# Patient Record
Sex: MALE | Race: WHITE | NOT HISPANIC OR LATINO | Employment: OTHER | URBAN - METROPOLITAN AREA
[De-identification: names, ages, dates, MRNs, and addresses within clinical notes are randomized per-mention and may not be internally consistent; named-entity substitution may affect disease eponyms.]

---

## 2019-08-23 ENCOUNTER — OFFICE VISIT (OUTPATIENT)
Dept: URGENT CARE | Facility: CLINIC | Age: 59
End: 2019-08-23
Payer: MEDICAID

## 2019-08-23 VITALS
HEART RATE: 101 BPM | DIASTOLIC BLOOD PRESSURE: 110 MMHG | SYSTOLIC BLOOD PRESSURE: 158 MMHG | RESPIRATION RATE: 16 BRPM | WEIGHT: 170 LBS | OXYGEN SATURATION: 98 % | TEMPERATURE: 98 F | HEIGHT: 70 IN | BODY MASS INDEX: 24.34 KG/M2

## 2019-08-23 DIAGNOSIS — H60.91 OTITIS EXTERNA OF RIGHT EAR, UNSPECIFIED CHRONICITY, UNSPECIFIED TYPE: Primary | ICD-10-CM

## 2019-08-23 DIAGNOSIS — H66.91 RIGHT OTITIS MEDIA, UNSPECIFIED OTITIS MEDIA TYPE: ICD-10-CM

## 2019-08-23 PROCEDURE — 99203 PR OFFICE/OUTPT VISIT, NEW, LEVL III, 30-44 MIN: ICD-10-PCS | Mod: S$GLB,,, | Performed by: PHYSICIAN ASSISTANT

## 2019-08-23 PROCEDURE — 99203 OFFICE O/P NEW LOW 30 MIN: CPT | Mod: S$GLB,,, | Performed by: PHYSICIAN ASSISTANT

## 2019-08-23 RX ORDER — NEOMYCIN SULFATE, POLYMYXIN B SULFATE, HYDROCORTISONE 3.5; 10000; 1 MG/ML; [USP'U]/ML; MG/ML
3 SOLUTION/ DROPS AURICULAR (OTIC) 4 TIMES DAILY
Qty: 10 ML | Refills: 0 | Status: SHIPPED | OUTPATIENT
Start: 2019-08-23 | End: 2019-08-30

## 2019-08-23 RX ORDER — FERROUS SULFATE, DRIED 160(50) MG
1 TABLET, EXTENDED RELEASE ORAL
COMMUNITY
Start: 2019-07-16 | End: 2022-03-09

## 2019-08-23 RX ORDER — FERROUS SULFATE 325(65) MG
325 TABLET ORAL DAILY
Status: ON HOLD | COMMUNITY
Start: 2019-07-16 | End: 2022-03-12

## 2019-08-23 RX ORDER — BICTEGRAVIR SODIUM, EMTRICITABINE, AND TENOFOVIR ALAFENAMIDE FUMARATE 50; 200; 25 MG/1; MG/1; MG/1
1 TABLET ORAL DAILY
Refills: 3 | COMMUNITY
Start: 2019-07-29

## 2019-08-23 RX ORDER — AMOXICILLIN AND CLAVULANATE POTASSIUM 875; 125 MG/1; MG/1
1 TABLET, FILM COATED ORAL 2 TIMES DAILY
Qty: 20 TABLET | Refills: 0 | Status: SHIPPED | OUTPATIENT
Start: 2019-08-23 | End: 2019-09-02

## 2019-08-23 RX ORDER — NADOLOL 40 MG/1
40 TABLET ORAL
COMMUNITY
Start: 2018-12-27 | End: 2022-03-09 | Stop reason: SDUPTHER

## 2019-08-23 NOTE — PROGRESS NOTES
"Subjective:       Patient ID: Daniel Good is a 58 y.o. male.    Vitals:  height is 5' 10" (1.778 m) and weight is 77.1 kg (170 lb). His oral temperature is 98.3 °F (36.8 °C). His blood pressure is 158/110 (abnormal) and his pulse is 101. His respiration is 16 and oxygen saturation is 98%.     Chief Complaint: Otalgia (Right)    Otalgia    There is pain in the right ear. This is a new problem. The current episode started in the past 7 days. The problem occurs constantly (while moving his mouth). The problem has been gradually worsening. There has been no fever. The pain is at a severity of 6/10. The pain is moderate. Associated symptoms include hearing loss. Pertinent negatives include no abdominal pain, coughing, diarrhea, ear discharge, headaches, neck pain, rash, rhinorrhea, sore throat or vomiting. He has tried nothing for the symptoms.       Constitution: Negative for chills, sweating, fatigue and fever.   HENT: Positive for ear pain and hearing loss. Negative for ear discharge, foreign body in ear, tinnitus, congestion, sore throat, trouble swallowing and voice change.    Neck: Negative for neck pain, neck stiffness, painful lymph nodes and neck swelling.   Cardiovascular: Negative for chest pain, leg swelling, palpitations, sob on exertion and passing out.   Eyes: Negative for eye pain, eye redness, photophobia, double vision and blurred vision.   Respiratory: Negative for chest tightness, cough, sputum production, bloody sputum, shortness of breath, stridor and wheezing.    Gastrointestinal: Negative for abdominal pain, abdominal bloating, nausea, vomiting, constipation, diarrhea and heartburn.   Genitourinary: Negative for dysuria, frequency and urgency.   Musculoskeletal: Negative for joint pain, joint swelling, muscle cramps and muscle ache.   Skin: Negative for color change, pale, rash and hives.   Allergic/Immunologic: Negative for seasonal allergies, hives, itching and sneezing. "   Neurological: Negative for dizziness, light-headedness, passing out, facial drooping, speech difficulty, loss of balance, headaches, altered mental status, loss of consciousness and seizures.   Hematologic/Lymphatic: Negative for swollen lymph nodes, easy bruising/bleeding and history of blood clots. Does not bruise/bleed easily.   Psychiatric/Behavioral: Negative for altered mental status, nervous/anxious, sleep disturbance and depression. The patient is not nervous/anxious.        Objective:      Physical Exam   Constitutional: He is oriented to person, place, and time. He appears well-developed and well-nourished. He is cooperative.  Non-toxic appearance. He does not appear ill. No distress.   HENT:   Head: Normocephalic and atraumatic.   Right Ear: External ear and ear canal normal. No lacerations. There is swelling and tenderness. No drainage. No foreign bodies. No mastoid tenderness. Tympanic membrane is injected and erythematous. Tympanic membrane is not scarred, not perforated, not retracted and not bulging. Tympanic membrane mobility is abnormal. A middle ear effusion is present. No hemotympanum. Decreased hearing is noted.   Left Ear: Hearing, tympanic membrane, external ear and ear canal normal.   Nose: Nose normal. No mucosal edema, rhinorrhea or nasal deformity. No epistaxis. Right sinus exhibits no maxillary sinus tenderness and no frontal sinus tenderness. Left sinus exhibits no maxillary sinus tenderness and no frontal sinus tenderness.   Mouth/Throat: Uvula is midline, oropharynx is clear and moist and mucous membranes are normal. No trismus in the jaw. Normal dentition. No uvula swelling. No posterior oropharyngeal erythema.   R ear tragal and pinna with retraction tenderness.   Eyes: Conjunctivae and lids are normal. No scleral icterus.   Sclera clear bilat   Neck: Trachea normal, full passive range of motion without pain and phonation normal. Neck supple.   Cardiovascular: Normal rate, regular  rhythm, normal heart sounds, intact distal pulses and normal pulses.   Pulmonary/Chest: Effort normal and breath sounds normal. No respiratory distress.   Abdominal: Soft. Normal appearance and bowel sounds are normal. He exhibits no distension. There is no tenderness.   Musculoskeletal: Normal range of motion. He exhibits no edema or deformity.   Neurological: He is alert and oriented to person, place, and time. He exhibits normal muscle tone. Coordination normal.   Skin: Skin is warm, dry and intact. Capillary refill takes less than 2 seconds. No rash noted. He is not diaphoretic. No pallor.   Psychiatric: He has a normal mood and affect. His speech is normal and behavior is normal. Judgment and thought content normal. Cognition and memory are normal.   Nursing note and vitals reviewed.      Assessment:       1. Otitis externa of right ear, unspecified chronicity, unspecified type    2. Right otitis media, unspecified otitis media type        Plan:         Otitis externa of right ear, unspecified chronicity, unspecified type  -     neomycin-polymyxin-hydrocortisone (CORTISPORIN) otic solution; Place 3 drops into the right ear 4 (four) times daily. for 7 days  Dispense: 10 mL; Refill: 0    Right otitis media, unspecified otitis media type  -     amoxicillin-clavulanate 875-125mg (AUGMENTIN) 875-125 mg per tablet; Take 1 tablet by mouth 2 (two) times daily. for 10 days  Dispense: 20 tablet; Refill: 0      Patient Instructions   You must understand that you've received an Urgent Care treatment only and that you may be released before all your medical problems are known or treated. You, the patient, will arrange for follow up care as instructed.  Follow up with your PCP or specialty clinic as directed if not improved or as needed. You can call [phone] to schedule an appointment with the appropriate provider.  If your condition worsens we recommend that you receive another evaluation at the Emergency Department  for any concerns or worsening of condition.  Patient aware and verbalized understanding.    Take full course of antibiotics as prescribed.  Humidifier use at home.  Warm compresses to affected ear.  Elevate head on a pillow at night.   Over the Counter Flonase Nasal Spray as directed for nasal congestion.  Over the Counter Claritin, Allegra or Zyrtec (plain) as directed for allergy symptoms/nasal congestion.  Over the Counter Tylenol every 6 hours as needed for fever or ear pain.  No swimming for at least 7-10 days and DO NOT place any foreign objects including Q-tips into affected ear because this could make it worse.  Follow up with your PCP in 1 week of initiating antibiotics or sooner for no improvement in symptoms.  Follow up in the ER for any worsening of symptoms such as new fever, increasing ear pain, neck stiffness, shortness of breath, etc.  If you smoke, please stop smoking.  Patient aware, verbalized understanding and agreed with plan of care.    Middle Ear Infection (Adult)  You have an infection of the middle ear, the space behind the eardrum. This is also called acute otitis media (AOM). Sometimes it is caused by the common cold. This is because congestion can block the internal passage (eustachian tube) that drains fluid from the middle ear. When the middle ear fills with fluid, bacteria can grow there and cause an infection. Oral antibiotics are used to treat this illness, not ear drops. Symptoms usually start to improve within 1 to 2 days of treatment.    Home care  The following are general care guidelines:  · Finish all of the antibiotic medicine given, even though you may feel better after the first few days.  · You may use over-the-counter medicine, such as acetaminophen or ibuprofen, to control pain and fever, unless something else was prescribed. If you have chronic liver or kidney disease or have ever had a stomach ulcer or gastrointestinal bleeding, talk with your healthcare provider before  using these medicines. Do not give aspirin to anyone under 18 years of age who has a fever. It may cause severe illness or death.  Follow-up care  Follow up with your healthcare provider, or as advised, in 2 weeks if all symptoms have not gotten better, or if hearing doesn't go back to normal within 1 month.  When to seek medical advice  Call your healthcare provider right away if any of these occur:  · Ear pain gets worse or does not improve after 3 days of treatment  · Unusual drowsiness or confusion  · Neck pain, stiff neck, or headache  · Fluid or blood draining from the ear canal  · Fever of 100.4°F (38°C) or as advised   · Seizure  Date Last Reviewed: 6/1/2016 © 2000-2017 [employer]. [address] [place] [address], [place] [address]. All rights reserved. This information is not intended as a substitute for professional medical care. Always follow your healthcare professional's instructions.    External Ear Infection (Adult)    External otitis (also called swimmers ear) is an infection in the ear canal. It is often caused by bacteria or fungus. It can occur a few days after water gets trapped in the ear canal (from swimming or bathing). It can also occur after cleaning too deeply in the ear canal with a cotton swab or other object. Sometimes, hair care products get into the ear canal and cause this problem.  Symptoms can include pain, fever, itching, redness, drainage, or swelling of the ear canal. Temporary hearing loss may also occur.  Home care  · Do not try to clean the ear canal. This can push pus and bacteria deeper into the canal.  · Use prescribed ear drops as directed. These help reduce swelling and fight the infection. If an ear wick was placed in the ear canal, apply drops right onto the end of the wick. The wick will draw the medication into the ear canal even if it is swollen closed.  · A cotton ball may be loosely placed in the outer ear to absorb any drainage.  · You may use  acetaminophen or ibuprofen to control pain, unless another medication was prescribed. Note: If you have chronic liver or kidney disease or ever had a stomach ulcer or GI bleeding, talk to your health care provider before taking any of these medications.  · Do not allow water to get into your ear when bathing. Also, avoid swimming until the infection has cleared.  Prevention  · Keep your ears dry. This helps lower the risk of infection. Dry your ears with a towel or hair dryer after getting wet. Also, use ear plugs when swimming.  · Do not stick any objects in the ear to remove wax.  · If you feel water trapped in your ear, use ear drops right away. You can get these drops over the counter at most drugstores. They work by removing water from the ear canal.  Follow-up care  Follow up with your health care provider in one week, or as advised.  When to seek medical advice  Call your health care provider right away if any of these occur:  · Ear pain becomes worse or doesnt improve after 3 days of treatment  · Redness or swelling of the outer ear occurs or gets worse  · Headache  · Painful or stiff neck  · Drowsiness or confusion  · Fever of 100.4ºF (38ºC) or higher, or as directed by your health care provider  · Seizure  Date Last Reviewed: 3/22/2015  © 3304-7877 [employer]. [address] [place] [address], [place] [address]. All rights reserved. This information is not intended as a substitute for professional medical care. Always follow your healthcare professional's instructions.

## 2019-08-23 NOTE — PATIENT INSTRUCTIONS
You must understand that you've received an Urgent Care treatment only and that you may be released before all your medical problems are known or treated. You, the patient, will arrange for follow up care as instructed.  Follow up with your PCP or specialty clinic as directed if not improved or as needed. You can call [phone] to schedule an appointment with the appropriate provider.  If your condition worsens we recommend that you receive another evaluation at the Emergency Department for any concerns or worsening of condition.  Patient aware and verbalized understanding.    Take full course of antibiotics as prescribed.  Humidifier use at home.  Warm compresses to affected ear.  Elevate head on a pillow at night.   Over the Counter Flonase Nasal Spray as directed for nasal congestion.  Over the Counter Claritin, Allegra or Zyrtec (plain) as directed for allergy symptoms/nasal congestion.  Over the Counter Tylenol every 6 hours as needed for fever or ear pain.  No swimming for at least 7-10 days and DO NOT place any foreign objects including Q-tips into affected ear because this could make it worse.  Follow up with your PCP in 1 week of initiating antibiotics or sooner for no improvement in symptoms.  Follow up in the ER for any worsening of symptoms such as new fever, increasing ear pain, neck stiffness, shortness of breath, etc.  If you smoke, please stop smoking.  Patient aware, verbalized understanding and agreed with plan of care.    Middle Ear Infection (Adult)  You have an infection of the middle ear, the space behind the eardrum. This is also called acute otitis media (AOM). Sometimes it is caused by the common cold. This is because congestion can block the internal passage (eustachian tube) that drains fluid from the middle ear. When the middle ear fills with fluid, bacteria can grow there and cause an infection. Oral antibiotics are used to treat this illness, not ear drops. Symptoms usually start  to improve within 1 to 2 days of treatment.    Home care  The following are general care guidelines:  · Finish all of the antibiotic medicine given, even though you may feel better after the first few days.  · You may use over-the-counter medicine, such as acetaminophen or ibuprofen, to control pain and fever, unless something else was prescribed. If you have chronic liver or kidney disease or have ever had a stomach ulcer or gastrointestinal bleeding, talk with your healthcare provider before using these medicines. Do not give aspirin to anyone under 18 years of age who has a fever. It may cause severe illness or death.  Follow-up care  Follow up with your healthcare provider, or as advised, in 2 weeks if all symptoms have not gotten better, or if hearing doesn't go back to normal within 1 month.  When to seek medical advice  Call your healthcare provider right away if any of these occur:  · Ear pain gets worse or does not improve after 3 days of treatment  · Unusual drowsiness or confusion  · Neck pain, stiff neck, or headache  · Fluid or blood draining from the ear canal  · Fever of 100.4°F (38°C) or as advised   · Seizure  Date Last Reviewed: 6/1/2016  © 9757-1830 [employer]. [address] [place] [address]. All rights reserved. This information is not intended as a substitute for professional medical care. Always follow your healthcare professional's instructions.    External Ear Infection (Adult)    External otitis (also called swimmers ear) is an infection in the ear canal. It is often caused by bacteria or fungus. It can occur a few days after water gets trapped in the ear canal (from swimming or bathing). It can also occur after cleaning too deeply in the ear canal with a cotton swab or other object. Sometimes, hair care products get into the ear canal and cause this problem.  Symptoms can include pain, fever, itching, redness, drainage, or swelling of the ear canal. Temporary  hearing loss may also occur.  Home care  · Do not try to clean the ear canal. This can push pus and bacteria deeper into the canal.  · Use prescribed ear drops as directed. These help reduce swelling and fight the infection. If an ear wick was placed in the ear canal, apply drops right onto the end of the wick. The wick will draw the medication into the ear canal even if it is swollen closed.  · A cotton ball may be loosely placed in the outer ear to absorb any drainage.  · You may use acetaminophen or ibuprofen to control pain, unless another medication was prescribed. Note: If you have chronic liver or kidney disease or ever had a stomach ulcer or GI bleeding, talk to your health care provider before taking any of these medications.  · Do not allow water to get into your ear when bathing. Also, avoid swimming until the infection has cleared.  Prevention  · Keep your ears dry. This helps lower the risk of infection. Dry your ears with a towel or hair dryer after getting wet. Also, use ear plugs when swimming.  · Do not stick any objects in the ear to remove wax.  · If you feel water trapped in your ear, use ear drops right away. You can get these drops over the counter at most drugstores. They work by removing water from the ear canal.  Follow-up care  Follow up with your health care provider in one week, or as advised.  When to seek medical advice  Call your health care provider right away if any of these occur:  · Ear pain becomes worse or doesnt improve after 3 days of treatment  · Redness or swelling of the outer ear occurs or gets worse  · Headache  · Painful or stiff neck  · Drowsiness or confusion  · Fever of 100.4ºF (38ºC) or higher, or as directed by your health care provider  · Seizure  Date Last Reviewed: 3/22/2015  © 0914-7351 [employer]. [address] [place] [address], [place] [address]. All rights reserved. This information is not intended as a substitute for professional medical care. Always  follow your healthcare professional's instructions.

## 2019-12-01 ENCOUNTER — OFFICE VISIT (OUTPATIENT)
Dept: URGENT CARE | Facility: CLINIC | Age: 59
End: 2019-12-01
Payer: MEDICAID

## 2019-12-01 VITALS
OXYGEN SATURATION: 96 % | TEMPERATURE: 98 F | SYSTOLIC BLOOD PRESSURE: 131 MMHG | RESPIRATION RATE: 18 BRPM | HEIGHT: 70 IN | WEIGHT: 170 LBS | HEART RATE: 92 BPM | BODY MASS INDEX: 24.34 KG/M2 | DIASTOLIC BLOOD PRESSURE: 86 MMHG

## 2019-12-01 DIAGNOSIS — H10.32 ACUTE BACTERIAL CONJUNCTIVITIS OF LEFT EYE: Primary | ICD-10-CM

## 2019-12-01 PROCEDURE — 99214 OFFICE O/P EST MOD 30 MIN: CPT | Mod: S$GLB,,, | Performed by: PHYSICIAN ASSISTANT

## 2019-12-01 PROCEDURE — 99214 PR OFFICE/OUTPT VISIT, EST, LEVL IV, 30-39 MIN: ICD-10-PCS | Mod: S$GLB,,, | Performed by: PHYSICIAN ASSISTANT

## 2019-12-01 RX ORDER — CIPROFLOXACIN HYDROCHLORIDE 3 MG/ML
1 SOLUTION/ DROPS OPHTHALMIC
Qty: 10 ML | Refills: 0 | Status: SHIPPED | OUTPATIENT
Start: 2019-12-01 | End: 2019-12-06

## 2019-12-01 NOTE — PROGRESS NOTES
"Subjective:       Patient ID: Daniel Good is a 58 y.o. male.    Vitals:  height is 5' 10" (1.778 m) and weight is 77.1 kg (170 lb). His temperature is 98.1 °F (36.7 °C). His blood pressure is 131/86 and his pulse is 92. His respiration is 18 and oxygen saturation is 96%.     Chief Complaint: Conjunctivitis    Pt w/ c/o left eye infection     Conjunctivitis   This is a new problem. The current episode started today. The problem occurs constantly. The problem has been gradually worsening. Pertinent negatives include no abdominal pain, anorexia, arthralgias, change in bowel habit, chest pain, chills, congestion, coughing, diaphoresis, fatigue, fever, headaches, joint swelling, myalgias, nausea, neck pain, numbness, rash, sore throat, swollen glands, urinary symptoms, vertigo, visual change, vomiting or weakness. Nothing aggravates the symptoms. Treatments tried: eye drops  The treatment provided no relief.       Constitution: Negative for chills, sweating, fatigue and fever.   HENT: Negative for congestion, sinus pain and sore throat.    Neck: Negative for neck pain.   Cardiovascular: Negative for chest pain.   Eyes: Positive for eye discharge, eye itching and eye redness. Negative for eye trauma, foreign body in eye, eye pain, photophobia, vision loss, double vision, blurred vision and eyelid swelling.   Respiratory: Negative for cough.    Gastrointestinal: Negative for abdominal pain, nausea and vomiting.   Musculoskeletal: Negative for joint pain, joint swelling and muscle ache.   Skin: Negative for rash.   Allergic/Immunologic: Negative for seasonal allergies and itching.   Neurological: Negative for history of vertigo, headaches and numbness.       Objective:      Physical Exam   Constitutional: He is oriented to person, place, and time. He appears well-developed and well-nourished.   HENT:   Head: Normocephalic and atraumatic.   Right Ear: External ear normal.   Left Ear: External ear normal.   Nose: " Nose normal.   Mouth/Throat: Oropharynx is clear and moist.   Eyes: Pupils are equal, round, and reactive to light. EOM and lids are normal. Right eye exhibits no chemosis, no discharge, no exudate and no hordeolum. No foreign body present in the right eye. Left eye exhibits discharge and exudate. Left eye exhibits no chemosis and no hordeolum. No foreign body present in the left eye. Right conjunctiva is not injected. Right conjunctiva has no hemorrhage. Left conjunctiva is injected. Left conjunctiva has no hemorrhage. No scleral icterus.   No signs of corneal abrasion, flare, or ulcer.  No signs of hyphema or hypopyon.   Neck: Trachea normal, full passive range of motion without pain and phonation normal. Neck supple.   Musculoskeletal: Normal range of motion.   Neurological: He is alert and oriented to person, place, and time.   Skin: Skin is warm, dry and intact.   Psychiatric: He has a normal mood and affect. His speech is normal and behavior is normal. Judgment and thought content normal. Cognition and memory are normal.   Nursing note and vitals reviewed.        Assessment:       1. Acute bacterial conjunctivitis of left eye        Plan:         Acute bacterial conjunctivitis of left eye  -     ciprofloxacin HCl (CILOXAN) 0.3 % ophthalmic solution; Place 1 drop into the left eye every 2 (two) hours. for 5 days  Dispense: 10 mL; Refill: 0           Conjunctivitis, Nonspecific    The membrane that covers the white part of your eye (the conjunctiva) is inflamed. Inflammation happens when your body responds to an injury, allergic reaction, infection, or illness. Symptoms of inflammation in the eye may include redness, irritation, itching, swelling, or burning. These symptoms should go away within the next 24 hours. Conjunctivitis may be related to a particle that was in your eye. If so, it may wash out with your tears or irrigation treatment. Being exposed to liquid chemicals or fumes may also cause this  reaction.   Home care  · Apply a cold pack (ice in a plastic bag, wrapped in a towel) over the eye for 20 minutes at a time. This will reduce pain.  · Artificial tears may be prescribed to reduce irritation or redness.  These should be used 3 to 4 times a day.  · You may use acetaminophen or ibuprofen to control pain, unless another medicine was prescribed.(Note: If you have chronic liver or kidney disease, or if you have ever had a stomach ulcer or gastrointestinal bleeding, talk with your healthcare provider before using these medicines.)  Follow-up care  Follow up with your healthcare provider, or as advised.  When to seek medical advice  Call your healthcare provider right away if any of these occur:  · Increased eyelid swelling  · Increased eye pain  · Increased redness or drainage from the eye  · Increased blurry vision or increased sensitivity to light  · Failure of normal vision to return within 24 to 48 hours  Date Last Reviewed: 6/14/2015  © 3277-1348 [employer]. [address] [place] [address]. All rights reserved. This information is not intended as a substitute for professional medical care. Always follow your healthcare professional's instructions.      Please follow up with your Primary care provider within 2-5 days if your signs and symptoms have not resolved or worsen.     If your condition worsens or fails to improve we recommend that you receive another evaluation at the emergency room immediately or contact your primary medical clinic to discuss your concerns.   You must understand that you have received an Urgent Care treatment only and that you may be released before all of your medical problems are known or treated. You, the patient, will arrange for follow up care as instructed.     RED FLAGS/WARNING SYMPTOMS DISCUSSED WITH PATIENT THAT WOULD WARRANT EMERGENT MEDICAL ATTENTION. PATIENT VERBALIZED UNDERSTANDING.

## 2019-12-01 NOTE — PATIENT INSTRUCTIONS
Conjunctivitis, Nonspecific    The membrane that covers the white part of your eye (the conjunctiva) is inflamed. Inflammation happens when your body responds to an injury, allergic reaction, infection, or illness. Symptoms of inflammation in the eye may include redness, irritation, itching, swelling, or burning. These symptoms should go away within the next 24 hours. Conjunctivitis may be related to a particle that was in your eye. If so, it may wash out with your tears or irrigation treatment. Being exposed to liquid chemicals or fumes may also cause this reaction.   Home care  · Apply a cold pack (ice in a plastic bag, wrapped in a towel) over the eye for 20 minutes at a time. This will reduce pain.  · Artificial tears may be prescribed to reduce irritation or redness.  These should be used 3 to 4 times a day.  · You may use acetaminophen or ibuprofen to control pain, unless another medicine was prescribed.(Note: If you have chronic liver or kidney disease, or if you have ever had a stomach ulcer or gastrointestinal bleeding, talk with your healthcare provider before using these medicines.)  Follow-up care  Follow up with your healthcare provider, or as advised.  When to seek medical advice  Call your healthcare provider right away if any of these occur:  · Increased eyelid swelling  · Increased eye pain  · Increased redness or drainage from the eye  · Increased blurry vision or increased sensitivity to light  · Failure of normal vision to return within 24 to 48 hours  Date Last Reviewed: 6/14/2015  © 4971-2098 [employer]. [address] [place] [address], [place] [id number]. All rights reserved. This information is not intended as a substitute for professional medical care. Always follow your healthcare professional's instructions.      Please follow up with your Primary care provider within 2-5 days if your signs and symptoms have not resolved or worsen.     If your condition worsens or fails to improve  we recommend that you receive another evaluation at the emergency room immediately or contact your primary medical clinic to discuss your concerns.   You must understand that you have received an Urgent Care treatment only and that you may be released before all of your medical problems are known or treated. You, the patient, will arrange for follow up care as instructed.     RED FLAGS/WARNING SYMPTOMS DISCUSSED WITH PATIENT THAT WOULD WARRANT EMERGENT MEDICAL ATTENTION. PATIENT VERBALIZED UNDERSTANDING.

## 2019-12-20 ENCOUNTER — OFFICE VISIT (OUTPATIENT)
Dept: URGENT CARE | Facility: CLINIC | Age: 59
End: 2019-12-20
Payer: MEDICAID

## 2019-12-20 VITALS
HEIGHT: 70 IN | OXYGEN SATURATION: 98 % | DIASTOLIC BLOOD PRESSURE: 88 MMHG | BODY MASS INDEX: 24.34 KG/M2 | SYSTOLIC BLOOD PRESSURE: 157 MMHG | TEMPERATURE: 98 F | WEIGHT: 170 LBS | RESPIRATION RATE: 18 BRPM | HEART RATE: 104 BPM

## 2019-12-20 DIAGNOSIS — H10.9 BACTERIAL CONJUNCTIVITIS OF LEFT EYE: Primary | ICD-10-CM

## 2019-12-20 DIAGNOSIS — R03.0 ELEVATED BLOOD-PRESSURE READING, WITHOUT DIAGNOSIS OF HYPERTENSION: ICD-10-CM

## 2019-12-20 PROCEDURE — 99214 OFFICE O/P EST MOD 30 MIN: CPT | Mod: S$GLB,,, | Performed by: NURSE PRACTITIONER

## 2019-12-20 PROCEDURE — 99214 PR OFFICE/OUTPT VISIT, EST, LEVL IV, 30-39 MIN: ICD-10-PCS | Mod: S$GLB,,, | Performed by: NURSE PRACTITIONER

## 2019-12-20 RX ORDER — CIPROFLOXACIN HYDROCHLORIDE 3 MG/ML
1 SOLUTION/ DROPS OPHTHALMIC
Qty: 5 ML | Refills: 0 | Status: SHIPPED | OUTPATIENT
Start: 2019-12-20 | End: 2019-12-25

## 2019-12-20 NOTE — PATIENT INSTRUCTIONS
Conjunctivitis  If your condition worsens or fails to improve we recommend that you receive another evaluation at the ER immediately or contact your PCP to discuss your concerns or return here. You must understand that you've received an urgent care treatment only and that you may be released before all your medical problems are known or treated. You the patient will arrange for followup care as instructed.   Keep eyes cleaned.  Use the eye drops as prescribed.    Do not wear your contact lens ( if you use them) for at least 5 days after you stop having symptoms and are rechecked by your doctor.   Avoid sharing towels while infection persist.  Cool compresses to affected eye.   Throw away the contacts, contact solution and carrying case you were using and start with new material.   If you develop increase eye symptoms or change in your vision seek medical care immediately either with your ophthalomologist or the ER or return here.

## 2019-12-20 NOTE — PROGRESS NOTES
"Subjective:       Patient ID: Daniel Good is a 59 y.o. male.    Vitals:  height is 5' 10" (1.778 m) and weight is 77.1 kg (170 lb). His temperature is 97.7 °F (36.5 °C). His blood pressure is 157/88 (abnormal) and his pulse is 104. His respiration is 18 and oxygen saturation is 98%.     Chief Complaint: Eye Problem    Eye Problem    The left eye is affected. This is a new problem. Episode onset: 2 weeks. The problem occurs constantly. The problem has been gradually worsening. There was no injury mechanism. The pain is at a severity of 3/10. The pain is mild. There is no known exposure to pink eye. He does not wear contacts. Associated symptoms include an eye discharge, eye redness and itching. Pertinent negatives include no blurred vision, double vision, fever, nausea, photophobia or vomiting. He has tried commercial eye wash for the symptoms. The treatment provided mild relief.       Constitution: Negative for chills and fever.   HENT: Negative for congestion and sinus pain.    Eyes: Positive for eye discharge, eye itching and eye redness. Negative for eye trauma, foreign body in eye, eye pain, photophobia, vision loss, double vision, blurred vision and eyelid swelling.   Gastrointestinal: Negative for nausea and vomiting.   Skin: Negative for rash.   Allergic/Immunologic: Negative for seasonal allergies and itching.   Neurological: Negative for headaches.       Objective:      Physical Exam   Constitutional: He appears well-developed and well-nourished. He is cooperative.   HENT:   Head: Normocephalic.   Right Ear: Hearing and external ear normal.   Left Ear: Hearing and external ear normal.   Nose: Nose normal.   Mouth/Throat: Uvula is midline, oropharynx is clear and moist and mucous membranes are normal.   Eyes: Pupils are equal, round, and reactive to light. EOM and lids are normal. Left eye exhibits discharge and exudate. Left conjunctiva is injected.   Denies foreign body sensation or pain "   Cardiovascular: Normal rate, regular rhythm and normal heart sounds.   Neurological: He is alert.   Nursing note and vitals reviewed.        Assessment:       1. Bacterial conjunctivitis of left eye    2. Elevated blood-pressure reading, without diagnosis of hypertension        Plan:     patient was treated approximately 2 weeks ago for bacterial conjunctivitis of bilateral eyes.  States that it had improved.  But has gone back to the left eye.  Patient states that there was some possible contamination of the bottle.    Bacterial conjunctivitis of left eye  -     ciprofloxacin HCl (CILOXAN) 0.3 % ophthalmic solution; Place 1 drop into the left eye every 2 (two) hours. for 5 days  Dispense: 5 mL; Refill: 0    Elevated blood-pressure reading, without diagnosis of hypertension         Patient Instructions                                      Conjunctivitis  If your condition worsens or fails to improve we recommend that you receive another evaluation at the ER immediately or contact your PCP to discuss your concerns or return here. You must understand that you've received an urgent care treatment only and that you may be released before all your medical problems are known or treated. You the patient will arrange for followup care as instructed.   Keep eyes cleaned.  Use the eye drops as prescribed.    Do not wear your contact lens ( if you use them) for at least 5 days after you stop having symptoms and are rechecked by your doctor.   Avoid sharing towels while infection persist.  Cool compresses to affected eye.   Throw away the contacts, contact solution and carrying case you were using and start with new material.   If you develop increase eye symptoms or change in your vision seek medical care immediately either with your ophthalomologist or the ER or return here.

## 2020-01-04 ENCOUNTER — OFFICE VISIT (OUTPATIENT)
Dept: URGENT CARE | Facility: CLINIC | Age: 60
End: 2020-01-04
Payer: MEDICAID

## 2020-01-04 VITALS
DIASTOLIC BLOOD PRESSURE: 90 MMHG | HEART RATE: 98 BPM | WEIGHT: 170 LBS | BODY MASS INDEX: 24.34 KG/M2 | HEIGHT: 70 IN | RESPIRATION RATE: 16 BRPM | TEMPERATURE: 99 F | SYSTOLIC BLOOD PRESSURE: 172 MMHG | OXYGEN SATURATION: 98 %

## 2020-01-04 DIAGNOSIS — H10.31 ACUTE BACTERIAL CONJUNCTIVITIS OF RIGHT EYE: Primary | ICD-10-CM

## 2020-01-04 PROCEDURE — 99213 PR OFFICE/OUTPT VISIT, EST, LEVL III, 20-29 MIN: ICD-10-PCS | Mod: S$GLB,,, | Performed by: FAMILY MEDICINE

## 2020-01-04 PROCEDURE — 99213 OFFICE O/P EST LOW 20 MIN: CPT | Mod: S$GLB,,, | Performed by: FAMILY MEDICINE

## 2020-01-04 RX ORDER — TOBRAMYCIN 3 MG/ML
1 SOLUTION/ DROPS OPHTHALMIC EVERY 4 HOURS
Qty: 5 ML | Refills: 0 | Status: SHIPPED | OUTPATIENT
Start: 2020-01-04 | End: 2020-01-14

## 2020-02-09 ENCOUNTER — OFFICE VISIT (OUTPATIENT)
Dept: URGENT CARE | Facility: CLINIC | Age: 60
End: 2020-02-09
Payer: MEDICAID

## 2020-02-09 VITALS
HEIGHT: 70 IN | WEIGHT: 175 LBS | TEMPERATURE: 98 F | OXYGEN SATURATION: 99 % | SYSTOLIC BLOOD PRESSURE: 129 MMHG | RESPIRATION RATE: 18 BRPM | HEART RATE: 92 BPM | BODY MASS INDEX: 25.05 KG/M2 | DIASTOLIC BLOOD PRESSURE: 71 MMHG

## 2020-02-09 DIAGNOSIS — S05.02XA ABRASION OF LEFT CORNEA, INITIAL ENCOUNTER: ICD-10-CM

## 2020-02-09 DIAGNOSIS — H10.32 ACUTE BACTERIAL CONJUNCTIVITIS OF LEFT EYE: Primary | ICD-10-CM

## 2020-02-09 PROCEDURE — 99214 PR OFFICE/OUTPT VISIT, EST, LEVL IV, 30-39 MIN: ICD-10-PCS | Mod: S$GLB,,, | Performed by: FAMILY MEDICINE

## 2020-02-09 PROCEDURE — 99214 OFFICE O/P EST MOD 30 MIN: CPT | Mod: S$GLB,,, | Performed by: FAMILY MEDICINE

## 2020-02-09 RX ORDER — CIPROFLOXACIN HYDROCHLORIDE 3 MG/ML
1 SOLUTION/ DROPS OPHTHALMIC
Qty: 10 ML | Refills: 1 | Status: SHIPPED | OUTPATIENT
Start: 2020-02-09 | End: 2020-02-16

## 2020-02-09 NOTE — PROGRESS NOTES
"Subjective:       Patient ID: Daniel Good is a 59 y.o. male.    Vitals:  height is 5' 10" (1.778 m) and weight is 79.4 kg (175 lb). His tympanic temperature is 98.2 °F (36.8 °C). His blood pressure is 129/71 and his pulse is 92. His respiration is 18 and oxygen saturation is 99%.     Chief Complaint: Eye Problem (4 days)    4 days left eye redness, watery crusted over in morning. Pain at the beginning not currently. Some blurry vision. Has appt with [facility] eye clinic in 3 days. No fever.     Eye Problem    The left eye is affected. This is a recurrent problem. The current episode started in the past 7 days. The problem occurs constantly. The problem has been unchanged. There was no injury mechanism. The pain is at a severity of 2/10. The pain is mild. There is no known exposure to pink eye. He does not wear contacts. Associated symptoms include an eye discharge, eye redness, itching and photophobia. Pertinent negatives include no blurred vision, double vision, fever, foreign body sensation, nausea, recent URI or vomiting. He has tried nothing for the symptoms. The treatment provided no relief.       Constitution: Negative for chills and fever.   HENT: Negative for congestion and sinus pain.    Eyes: Positive for eye discharge, eye itching, eye pain, eye redness and photophobia. Negative for eye trauma, foreign body in eye, vision loss, double vision, blurred vision and eyelid swelling.   Gastrointestinal: Negative for nausea and vomiting.   Skin: Negative for rash.   Allergic/Immunologic: Negative for seasonal allergies and itching.   Neurological: Negative for headaches.       Objective:      Physical Exam   Constitutional: He is oriented to person, place, and time. He appears well-developed and well-nourished.   HENT:   Head: Normocephalic and atraumatic.   Right Ear: External ear normal.   Left Ear: External ear normal.   Nose: Nose normal.   Mouth/Throat: Oropharynx is clear and moist.   Eyes: Pupils are " equal, round, and reactive to light. EOM and lids are normal. Lids are everted and swept, no foreign bodies found. Right eye exhibits no chemosis, no discharge, no exudate and no hordeolum. No foreign body present in the right eye. Left eye exhibits no chemosis, no discharge, no exudate and no hordeolum. No foreign body present in the left eye. Left conjunctiva is injected.   Slit lamp exam:       The left eye shows corneal abrasion and fluorescein uptake.       Neck: Trachea normal, full passive range of motion without pain and phonation normal. Neck supple.   Musculoskeletal: Normal range of motion.   Neurological: He is alert and oriented to person, place, and time.   Skin: Skin is warm, dry and intact.   Psychiatric: He has a normal mood and affect. His speech is normal and behavior is normal. Judgment and thought content normal. Cognition and memory are normal.   Nursing note and vitals reviewed.         Visual Acuity Screening    Right eye Left eye Both eyes   Without correction: 20/40 20/40    With correction:            Assessment:       1. Acute bacterial conjunctivitis of left eye    2. Abrasion of left cornea, initial encounter        Plan:         Acute bacterial conjunctivitis of left eye  -     ciprofloxacin HCl (CILOXAN) 0.3 % ophthalmic solution; Place 1 drop into the left eye every 2 (two) hours. Instill 1 drop into the conjunctival sac every 2 hours while awake for 2 days and 1 drop every 4 hours while awake for the next 5 days for 7 days  Dispense: 10 mL; Refill: 1    Abrasion of left cornea, initial encounter    discussed sooner f/u if worsening or new sx. This has been his 4th time with similar sx in the past 3 months. He does not wear contacts, unclear if abrasions or ulcerations to the cornea, considered scleritis?    Patient Instructions   PLEASE READ YOUR DISCHARGE INSTRUCTIONS ENTIRELY AS IT CONTAINS IMPORTANT INFORMATION.     Use the antibiotic drops every 2 hours while awake first two  days then every 4 hours  While awake next 5 days.      Keep hands clean.      Can use saline drops throughout the day if eyes feel dry or irritated.         Please keep your appt wed, call for sooner appt or ER if worsening symptoms (vision changes, pain, fever, swelling around your eye).      Please arrange follow up with your primary medical clinic as soon as possible. You must understand that you've received an Urgent Care treatment only and that you may be released before all of your medical problems are known or treated. You, the patient, will arrange for follow up as instructed. If your symptoms worsen or fail to improve you should go to the Emergency Room.  WE CANNOT RULE OUT ALL POSSIBLE CAUSES OF YOUR SYMPTOMS IN THE URGENT CARE SETTING PLEASE GO TO THE ER IF YOU FEELS YOUR CONDITION IS WORSENING OR YOU WOULD LIKE EMERGENT EVALUATION.      Corneal Abrasion    You have received a scratch or scrape (abrasion) to your cornea. The cornea is the clear part in the front of the eye. This sensitive area is very painful when injured. You may make tears frequently, and your vision may be blurry until the injury heals. You may be sensitive to light.  This part of the body heals quickly. You can expect the pain to go away within 24 to 48 hours. If the abrasion is large or deep, your doctor may apply an eye patch, although this is not always done. An antibiotic ointment or eye drops may also be used to prevent infection.  Numbing drops may be used to relieve the pain temporarily so that your eyes can be examined. However, these drops cannot be prescribed for home use because that would prevent healing and lead to more serious problems. Also, if you cant feel your eye, there is a chance of accidentally injuring it further without knowing it.  Home care  · A cold pack (ice in a plastic bag, wrapped in a towel) may be applied over the eye (or eye patch) for 20 minutes at a time, to reduce pain.  · You may use  acetaminophen or ibuprofen to control pain, unless another pain medicine was prescribed. Note: If you have chronic liver or kidney disease or ever had a stomach ulcer or GI bleeding, talk with your doctor before using these medicines.  · Rest your eyes and do not read until symptoms are gone.  · If you use contact lenses, do not wear them until all symptoms are gone.  · If your vision is affected by the corneal abrasion or if an eye patch was applied, do not drive a motor vehicle or operate machinery until all symptoms are gone. You may have trouble judging distances using only one eye.  · If your eyes are sensitive to light, try wearing sunglasses, or stay indoors until symptoms go away.  Follow-up care  Follow up with your health care provider, or as advised.  · If no patch was put on your eye, and used but the pain continues for more than 48 hours, you should have another exam. Return to this facility or contact your health care provider to arrange this.  · If your eye was patched and you were asked to remove the patch yourself, see your health care provider. You may also return to this facility if you still have pain after the patch is removed.  · If you were given a return appointment for patch removal and re-examination, be sure to keep the appointment. Leaving the patch in place longer than advised could be harmful.  When to seek medical advice  Call your health care provider right away if any of these occur.  · Increasing eye pain or pain that does not improve after 24 hours  · Discharge from the eye  · Increasing redness of the eye or swelling of the eyelids  · Worsening vision  · Symptoms that worsen after the abrasion has healed  Date Last Reviewed: 6/14/2015 © 2000-2017 [employer]. [address] [place] [address], [place] [address]. All rights reserved. This information is not intended as a substitute for professional medical care. Always follow your healthcare professional's  instructions.        Conjunctivitis, Nonspecific    The membrane that covers the white part of your eye (the conjunctiva) is inflamed. Inflammation happens when your body responds to an injury, allergic reaction, infection, or illness. Symptoms of inflammation in the eye may include redness, irritation, itching, swelling, or burning. These symptoms should go away within the next 24 hours. Conjunctivitis may be related to a particle that was in your eye. If so, it may wash out with your tears or irrigation treatment. Being exposed to liquid chemicals or fumes may also cause this reaction.   Home care  · Apply a cold pack (ice in a plastic bag, wrapped in a towel) over the eye for 20 minutes at a time. This will reduce pain.  · Artificial tears may be prescribed to reduce irritation or redness.  These should be used 3 to 4 times a day.  · You may use acetaminophen or ibuprofen to control pain, unless another medicine was prescribed.(Note: If you have chronic liver or kidney disease, or if you have ever had a stomach ulcer or gastrointestinal bleeding, talk with your healthcare provider before using these medicines.)  Follow-up care  Follow up with your healthcare provider, or as advised.  When to seek medical advice  Call your healthcare provider right away if any of these occur:  · Increased eyelid swelling  · Increased eye pain  · Increased redness or drainage from the eye  · Increased blurry vision or increased sensitivity to light  · Failure of normal vision to return within 24 to 48 hours  Date Last Reviewed: 6/14/2015  © 0879-0889 [employer]. [address] [place] [address], [place] [address]. All rights reserved. This information is not intended as a substitute for professional medical care. Always follow your healthcare professional's instructions.

## 2020-02-10 NOTE — PATIENT INSTRUCTIONS
PLEASE READ YOUR DISCHARGE INSTRUCTIONS ENTIRELY AS IT CONTAINS IMPORTANT INFORMATION.     Use the antibiotic drops every 2 hours while awake first two days then every 4 hours  While awake next 5 days.      Keep hands clean.      Can use saline drops throughout the day if eyes feel dry or irritated.         Please keep your appt wed, call for sooner appt or ER if worsening symptoms (vision changes, pain, fever, swelling around your eye).      Please arrange follow up with your primary medical clinic as soon as possible. You must understand that you've received an Urgent Care treatment only and that you may be released before all of your medical problems are known or treated. You, the patient, will arrange for follow up as instructed. If your symptoms worsen or fail to improve you should go to the Emergency Room.  WE CANNOT RULE OUT ALL POSSIBLE CAUSES OF YOUR SYMPTOMS IN THE URGENT CARE SETTING PLEASE GO TO THE ER IF YOU FEELS YOUR CONDITION IS WORSENING OR YOU WOULD LIKE EMERGENT EVALUATION.      Corneal Abrasion    You have received a scratch or scrape (abrasion) to your cornea. The cornea is the clear part in the front of the eye. This sensitive area is very painful when injured. You may make tears frequently, and your vision may be blurry until the injury heals. You may be sensitive to light.  This part of the body heals quickly. You can expect the pain to go away within 24 to 48 hours. If the abrasion is large or deep, your doctor may apply an eye patch, although this is not always done. An antibiotic ointment or eye drops may also be used to prevent infection.  Numbing drops may be used to relieve the pain temporarily so that your eyes can be examined. However, these drops cannot be prescribed for home use because that would prevent healing and lead to more serious problems. Also, if you cant feel your eye, there is a chance of accidentally injuring it further without knowing it.  Home care  · A cold pack  (ice in a plastic bag, wrapped in a towel) may be applied over the eye (or eye patch) for 20 minutes at a time, to reduce pain.  · You may use acetaminophen or ibuprofen to control pain, unless another pain medicine was prescribed. Note: If you have chronic liver or kidney disease or ever had a stomach ulcer or GI bleeding, talk with your doctor before using these medicines.  · Rest your eyes and do not read until symptoms are gone.  · If you use contact lenses, do not wear them until all symptoms are gone.  · If your vision is affected by the corneal abrasion or if an eye patch was applied, do not drive a motor vehicle or operate machinery until all symptoms are gone. You may have trouble judging distances using only one eye.  · If your eyes are sensitive to light, try wearing sunglasses, or stay indoors until symptoms go away.  Follow-up care  Follow up with your health care provider, or as advised.  · If no patch was put on your eye, and used but the pain continues for more than 48 hours, you should have another exam. Return to this facility or contact your health care provider to arrange this.  · If your eye was patched and you were asked to remove the patch yourself, see your health care provider. You may also return to this facility if you still have pain after the patch is removed.  · If you were given a return appointment for patch removal and re-examination, be sure to keep the appointment. Leaving the patch in place longer than advised could be harmful.  When to seek medical advice  Call your health care provider right away if any of these occur.  · Increasing eye pain or pain that does not improve after 24 hours  · Discharge from the eye  · Increasing redness of the eye or swelling of the eyelids  · Worsening vision  · Symptoms that worsen after the abrasion has healed  Date Last Reviewed: 6/14/2015  © 9960-0665 [employer]. [address] [place] [address], [place] [address]. All rights reserved.  This information is not intended as a substitute for professional medical care. Always follow your healthcare professional's instructions.        Conjunctivitis, Nonspecific    The membrane that covers the white part of your eye (the conjunctiva) is inflamed. Inflammation happens when your body responds to an injury, allergic reaction, infection, or illness. Symptoms of inflammation in the eye may include redness, irritation, itching, swelling, or burning. These symptoms should go away within the next 24 hours. Conjunctivitis may be related to a particle that was in your eye. If so, it may wash out with your tears or irrigation treatment. Being exposed to liquid chemicals or fumes may also cause this reaction.   Home care  · Apply a cold pack (ice in a plastic bag, wrapped in a towel) over the eye for 20 minutes at a time. This will reduce pain.  · Artificial tears may be prescribed to reduce irritation or redness.  These should be used 3 to 4 times a day.  · You may use acetaminophen or ibuprofen to control pain, unless another medicine was prescribed.(Note: If you have chronic liver or kidney disease, or if you have ever had a stomach ulcer or gastrointestinal bleeding, talk with your healthcare provider before using these medicines.)  Follow-up care  Follow up with your healthcare provider, or as advised.  When to seek medical advice  Call your healthcare provider right away if any of these occur:  · Increased eyelid swelling  · Increased eye pain  · Increased redness or drainage from the eye  · Increased blurry vision or increased sensitivity to light  · Failure of normal vision to return within 24 to 48 hours  Date Last Reviewed: 6/14/2015  © 4622-3693 [employer]. [address] [place] [address], [place] [address]. All rights reserved. This information is not intended as a substitute for professional medical care. Always follow your healthcare professional's instructions.

## 2020-09-17 DIAGNOSIS — Z03.818 ENCOUNTER FOR OBSERVATION FOR SUSPECTED EXPOSURE TO OTHER BIOLOGICAL AGENTS RULED OUT: ICD-10-CM

## 2020-09-18 ENCOUNTER — LAB VISIT (OUTPATIENT)
Dept: URGENT CARE | Facility: CLINIC | Age: 60
End: 2020-09-18
Payer: MEDICAID

## 2020-09-18 DIAGNOSIS — Z03.818 ENCOUNTER FOR OBSERVATION FOR SUSPECTED EXPOSURE TO OTHER BIOLOGICAL AGENTS RULED OUT: ICD-10-CM

## 2020-09-18 LAB — SARS-COV-2 RNA RESP QL NAA+PROBE: NOT DETECTED

## 2020-09-18 PROCEDURE — U0003 INFECTIOUS AGENT DETECTION BY NUCLEIC ACID (DNA OR RNA); SEVERE ACUTE RESPIRATORY SYNDROME CORONAVIRUS 2 (SARS-COV-2) (CORONAVIRUS DISEASE [COVID-19]), AMPLIFIED PROBE TECHNIQUE, MAKING USE OF HIGH THROUGHPUT TECHNOLOGIES AS DESCRIBED BY CMS-2020-01-R: HCPCS

## 2020-09-19 DIAGNOSIS — Z03.818 ENCOUNTER FOR OBSERVATION FOR SUSPECTED EXPOSURE TO OTHER BIOLOGICAL AGENTS RULED OUT: ICD-10-CM

## 2020-09-21 ENCOUNTER — LAB VISIT (OUTPATIENT)
Dept: URGENT CARE | Facility: CLINIC | Age: 60
End: 2020-09-21

## 2020-09-21 DIAGNOSIS — Z03.818 ENCOUNTER FOR OBSERVATION FOR SUSPECTED EXPOSURE TO OTHER BIOLOGICAL AGENTS RULED OUT: ICD-10-CM

## 2020-09-21 LAB
CTP QC/QA: YES
SARS-COV-2 RDRP RESP QL NAA+PROBE: NEGATIVE

## 2020-09-21 PROCEDURE — U0002 COVID-19 LAB TEST NON-CDC: HCPCS | Mod: S$GLB,,, | Performed by: EMERGENCY MEDICINE

## 2020-09-21 PROCEDURE — U0002: ICD-10-PCS | Mod: S$GLB,,, | Performed by: EMERGENCY MEDICINE

## 2020-09-22 DIAGNOSIS — Z13.9 ENCOUNTER FOR SCREENING: Primary | ICD-10-CM

## 2020-09-23 ENCOUNTER — LAB VISIT (OUTPATIENT)
Dept: URGENT CARE | Facility: CLINIC | Age: 60
End: 2020-09-23

## 2020-09-23 DIAGNOSIS — Z13.9 ENCOUNTER FOR SCREENING: ICD-10-CM

## 2020-09-23 LAB
CTP QC/QA: YES
SARS-COV-2 RDRP RESP QL NAA+PROBE: NEGATIVE

## 2020-09-23 PROCEDURE — U0002 COVID-19 LAB TEST NON-CDC: HCPCS | Mod: QW,S$GLB,, | Performed by: EMERGENCY MEDICINE

## 2020-09-23 PROCEDURE — U0002: ICD-10-PCS | Mod: QW,S$GLB,, | Performed by: EMERGENCY MEDICINE

## 2020-09-24 DIAGNOSIS — Z03.818 ENCOUNTER FOR OBSERVATION FOR SUSPECTED EXPOSURE TO OTHER BIOLOGICAL AGENTS RULED OUT: ICD-10-CM

## 2020-09-25 ENCOUNTER — LAB VISIT (OUTPATIENT)
Dept: URGENT CARE | Facility: CLINIC | Age: 60
End: 2020-09-25

## 2020-09-25 DIAGNOSIS — Z03.818 ENCOUNTER FOR OBSERVATION FOR SUSPECTED EXPOSURE TO OTHER BIOLOGICAL AGENTS RULED OUT: ICD-10-CM

## 2020-09-25 LAB
CTP QC/QA: YES
SARS-COV-2 RDRP RESP QL NAA+PROBE: NEGATIVE

## 2020-09-25 PROCEDURE — U0002 COVID-19 LAB TEST NON-CDC: HCPCS | Mod: QW,S$GLB,, | Performed by: EMERGENCY MEDICINE

## 2020-09-25 PROCEDURE — U0002: ICD-10-PCS | Mod: QW,S$GLB,, | Performed by: EMERGENCY MEDICINE

## 2020-09-26 DIAGNOSIS — Z03.818 ENCOUNTER FOR OBSERVATION FOR SUSPECTED EXPOSURE TO OTHER BIOLOGICAL AGENTS RULED OUT: ICD-10-CM

## 2020-09-28 ENCOUNTER — LAB VISIT (OUTPATIENT)
Dept: URGENT CARE | Facility: CLINIC | Age: 60
End: 2020-09-28

## 2020-09-28 DIAGNOSIS — Z03.818 ENCOUNTER FOR OBSERVATION FOR SUSPECTED EXPOSURE TO OTHER BIOLOGICAL AGENTS RULED OUT: ICD-10-CM

## 2020-09-28 LAB
CTP QC/QA: YES
SARS-COV-2 RDRP RESP QL NAA+PROBE: NEGATIVE

## 2020-09-28 PROCEDURE — U0002 COVID-19 LAB TEST NON-CDC: HCPCS | Mod: QW,S$GLB,, | Performed by: EMERGENCY MEDICINE

## 2020-09-28 PROCEDURE — U0002: ICD-10-PCS | Mod: QW,S$GLB,, | Performed by: EMERGENCY MEDICINE

## 2020-09-29 DIAGNOSIS — Z03.818 ENCOUNTER FOR OBSERVATION FOR SUSPECTED EXPOSURE TO OTHER BIOLOGICAL AGENTS RULED OUT: ICD-10-CM

## 2020-09-30 ENCOUNTER — LAB VISIT (OUTPATIENT)
Dept: URGENT CARE | Facility: CLINIC | Age: 60
End: 2020-09-30

## 2020-09-30 DIAGNOSIS — Z03.818 ENCOUNTER FOR OBSERVATION FOR SUSPECTED EXPOSURE TO OTHER BIOLOGICAL AGENTS RULED OUT: ICD-10-CM

## 2020-09-30 LAB
CTP QC/QA: YES
SARS-COV-2 RDRP RESP QL NAA+PROBE: NEGATIVE

## 2020-09-30 PROCEDURE — U0002 COVID-19 LAB TEST NON-CDC: HCPCS | Mod: QW,S$GLB,, | Performed by: EMERGENCY MEDICINE

## 2020-09-30 PROCEDURE — U0002: ICD-10-PCS | Mod: QW,S$GLB,, | Performed by: EMERGENCY MEDICINE

## 2020-10-01 DIAGNOSIS — Z03.818 ENCOUNTER FOR OBSERVATION FOR SUSPECTED EXPOSURE TO OTHER BIOLOGICAL AGENTS RULED OUT: ICD-10-CM

## 2020-10-02 ENCOUNTER — LAB VISIT (OUTPATIENT)
Dept: URGENT CARE | Facility: CLINIC | Age: 60
End: 2020-10-02
Payer: MEDICAID

## 2020-10-02 DIAGNOSIS — Z03.818 ENCOUNTER FOR OBSERVATION FOR SUSPECTED EXPOSURE TO OTHER BIOLOGICAL AGENTS RULED OUT: ICD-10-CM

## 2020-10-02 PROCEDURE — U0003 INFECTIOUS AGENT DETECTION BY NUCLEIC ACID (DNA OR RNA); SEVERE ACUTE RESPIRATORY SYNDROME CORONAVIRUS 2 (SARS-COV-2) (CORONAVIRUS DISEASE [COVID-19]), AMPLIFIED PROBE TECHNIQUE, MAKING USE OF HIGH THROUGHPUT TECHNOLOGIES AS DESCRIBED BY CMS-2020-01-R: HCPCS

## 2020-10-03 LAB — SARS-COV-2 RNA RESP QL NAA+PROBE: NOT DETECTED

## 2020-10-04 DIAGNOSIS — Z03.818 ENCOUNTER FOR OBSERVATION FOR SUSPECTED EXPOSURE TO OTHER BIOLOGICAL AGENTS RULED OUT: ICD-10-CM

## 2020-10-05 ENCOUNTER — LAB VISIT (OUTPATIENT)
Dept: URGENT CARE | Facility: CLINIC | Age: 60
End: 2020-10-05

## 2020-10-05 DIAGNOSIS — Z03.818 ENCOUNTER FOR OBSERVATION FOR SUSPECTED EXPOSURE TO OTHER BIOLOGICAL AGENTS RULED OUT: ICD-10-CM

## 2020-10-05 LAB
CTP QC/QA: YES
SARS-COV-2 RDRP RESP QL NAA+PROBE: NEGATIVE

## 2020-10-05 PROCEDURE — U0002 COVID-19 LAB TEST NON-CDC: HCPCS | Mod: QW,S$GLB,, | Performed by: EMERGENCY MEDICINE

## 2020-10-05 PROCEDURE — U0002: ICD-10-PCS | Mod: QW,S$GLB,, | Performed by: EMERGENCY MEDICINE

## 2020-10-06 DIAGNOSIS — Z03.818 ENCOUNTER FOR OBSERVATION FOR SUSPECTED EXPOSURE TO OTHER BIOLOGICAL AGENTS RULED OUT: ICD-10-CM

## 2020-10-07 ENCOUNTER — LAB VISIT (OUTPATIENT)
Dept: URGENT CARE | Facility: CLINIC | Age: 60
End: 2020-10-07

## 2020-10-07 DIAGNOSIS — Z03.818 ENCOUNTER FOR OBSERVATION FOR SUSPECTED EXPOSURE TO OTHER BIOLOGICAL AGENTS RULED OUT: ICD-10-CM

## 2020-10-07 LAB
CTP QC/QA: YES
SARS-COV-2 RDRP RESP QL NAA+PROBE: NEGATIVE

## 2020-10-07 PROCEDURE — U0002: ICD-10-PCS | Mod: QW,S$GLB,, | Performed by: EMERGENCY MEDICINE

## 2020-10-07 PROCEDURE — U0002 COVID-19 LAB TEST NON-CDC: HCPCS | Mod: QW,S$GLB,, | Performed by: EMERGENCY MEDICINE

## 2020-10-08 DIAGNOSIS — Z03.818 ENCOUNTER FOR OBSERVATION FOR SUSPECTED EXPOSURE TO OTHER BIOLOGICAL AGENTS RULED OUT: ICD-10-CM

## 2020-10-09 ENCOUNTER — LAB VISIT (OUTPATIENT)
Dept: URGENT CARE | Facility: CLINIC | Age: 60
End: 2020-10-09
Payer: MEDICAID

## 2020-10-09 DIAGNOSIS — Z03.818 ENCOUNTER FOR OBSERVATION FOR SUSPECTED EXPOSURE TO OTHER BIOLOGICAL AGENTS RULED OUT: ICD-10-CM

## 2020-10-09 PROCEDURE — U0003 INFECTIOUS AGENT DETECTION BY NUCLEIC ACID (DNA OR RNA); SEVERE ACUTE RESPIRATORY SYNDROME CORONAVIRUS 2 (SARS-COV-2) (CORONAVIRUS DISEASE [COVID-19]), AMPLIFIED PROBE TECHNIQUE, MAKING USE OF HIGH THROUGHPUT TECHNOLOGIES AS DESCRIBED BY CMS-2020-01-R: HCPCS

## 2020-10-10 LAB — SARS-COV-2 RNA RESP QL NAA+PROBE: NOT DETECTED

## 2020-10-13 DIAGNOSIS — Z03.818 ENCOUNTER FOR OBSERVATION FOR SUSPECTED EXPOSURE TO OTHER BIOLOGICAL AGENTS RULED OUT: ICD-10-CM

## 2020-10-14 ENCOUNTER — LAB VISIT (OUTPATIENT)
Dept: URGENT CARE | Facility: CLINIC | Age: 60
End: 2020-10-14

## 2020-10-14 DIAGNOSIS — Z03.818 ENCOUNTER FOR OBSERVATION FOR SUSPECTED EXPOSURE TO OTHER BIOLOGICAL AGENTS RULED OUT: ICD-10-CM

## 2020-10-14 LAB
CTP QC/QA: YES
SARS-COV-2 RDRP RESP QL NAA+PROBE: NEGATIVE

## 2020-10-14 PROCEDURE — U0002: ICD-10-PCS | Mod: QW,S$GLB,, | Performed by: EMERGENCY MEDICINE

## 2020-10-14 PROCEDURE — U0002 COVID-19 LAB TEST NON-CDC: HCPCS | Mod: QW,S$GLB,, | Performed by: EMERGENCY MEDICINE

## 2020-10-15 DIAGNOSIS — Z03.818 ENCOUNTER FOR OBSERVATION FOR SUSPECTED EXPOSURE TO OTHER BIOLOGICAL AGENTS RULED OUT: ICD-10-CM

## 2020-10-16 ENCOUNTER — LAB VISIT (OUTPATIENT)
Dept: URGENT CARE | Facility: CLINIC | Age: 60
End: 2020-10-16
Payer: MEDICAID

## 2020-10-16 DIAGNOSIS — Z03.818 ENCOUNTER FOR OBSERVATION FOR SUSPECTED EXPOSURE TO OTHER BIOLOGICAL AGENTS RULED OUT: ICD-10-CM

## 2020-10-16 PROCEDURE — U0003 INFECTIOUS AGENT DETECTION BY NUCLEIC ACID (DNA OR RNA); SEVERE ACUTE RESPIRATORY SYNDROME CORONAVIRUS 2 (SARS-COV-2) (CORONAVIRUS DISEASE [COVID-19]), AMPLIFIED PROBE TECHNIQUE, MAKING USE OF HIGH THROUGHPUT TECHNOLOGIES AS DESCRIBED BY CMS-2020-01-R: HCPCS

## 2020-10-17 LAB — SARS-COV-2 RNA RESP QL NAA+PROBE: NOT DETECTED

## 2020-10-18 DIAGNOSIS — Z03.818 ENCOUNTER FOR OBSERVATION FOR SUSPECTED EXPOSURE TO OTHER BIOLOGICAL AGENTS RULED OUT: ICD-10-CM

## 2020-10-19 ENCOUNTER — LAB VISIT (OUTPATIENT)
Dept: URGENT CARE | Facility: CLINIC | Age: 60
End: 2020-10-19

## 2020-10-19 DIAGNOSIS — Z03.818 ENCOUNTER FOR OBSERVATION FOR SUSPECTED EXPOSURE TO OTHER BIOLOGICAL AGENTS RULED OUT: ICD-10-CM

## 2020-10-19 LAB
CTP QC/QA: YES
SARS-COV-2 RDRP RESP QL NAA+PROBE: NEGATIVE

## 2020-10-19 PROCEDURE — U0002 COVID-19 LAB TEST NON-CDC: HCPCS | Mod: QW,S$GLB,, | Performed by: EMERGENCY MEDICINE

## 2020-10-19 PROCEDURE — U0002: ICD-10-PCS | Mod: QW,S$GLB,, | Performed by: EMERGENCY MEDICINE

## 2020-10-20 DIAGNOSIS — Z03.818 ENCOUNTER FOR OBSERVATION FOR SUSPECTED EXPOSURE TO OTHER BIOLOGICAL AGENTS RULED OUT: ICD-10-CM

## 2020-10-21 ENCOUNTER — LAB VISIT (OUTPATIENT)
Dept: URGENT CARE | Facility: CLINIC | Age: 60
End: 2020-10-21

## 2020-10-21 DIAGNOSIS — Z03.818 ENCOUNTER FOR OBSERVATION FOR SUSPECTED EXPOSURE TO OTHER BIOLOGICAL AGENTS RULED OUT: ICD-10-CM

## 2020-10-21 LAB
CTP QC/QA: YES
SARS-COV-2 RDRP RESP QL NAA+PROBE: NEGATIVE

## 2020-10-21 PROCEDURE — U0002 COVID-19 LAB TEST NON-CDC: HCPCS | Mod: QW,S$GLB,, | Performed by: EMERGENCY MEDICINE

## 2020-10-21 PROCEDURE — U0002: ICD-10-PCS | Mod: QW,S$GLB,, | Performed by: EMERGENCY MEDICINE

## 2020-10-22 DIAGNOSIS — Z03.818 ENCOUNTER FOR OBSERVATION FOR SUSPECTED EXPOSURE TO OTHER BIOLOGICAL AGENTS RULED OUT: ICD-10-CM

## 2020-10-25 DIAGNOSIS — Z03.818 ENCOUNTER FOR OBSERVATION FOR SUSPECTED EXPOSURE TO OTHER BIOLOGICAL AGENTS RULED OUT: ICD-10-CM

## 2020-10-26 ENCOUNTER — LAB VISIT (OUTPATIENT)
Dept: URGENT CARE | Facility: CLINIC | Age: 60
End: 2020-10-26
Payer: MEDICAID

## 2020-10-26 DIAGNOSIS — Z03.818 ENCOUNTER FOR OBSERVATION FOR SUSPECTED EXPOSURE TO OTHER BIOLOGICAL AGENTS RULED OUT: ICD-10-CM

## 2020-10-26 PROCEDURE — U0003 INFECTIOUS AGENT DETECTION BY NUCLEIC ACID (DNA OR RNA); SEVERE ACUTE RESPIRATORY SYNDROME CORONAVIRUS 2 (SARS-COV-2) (CORONAVIRUS DISEASE [COVID-19]), AMPLIFIED PROBE TECHNIQUE, MAKING USE OF HIGH THROUGHPUT TECHNOLOGIES AS DESCRIBED BY CMS-2020-01-R: HCPCS

## 2020-10-27 DIAGNOSIS — Z03.818 ENCOUNTER FOR OBSERVATION FOR SUSPECTED EXPOSURE TO OTHER BIOLOGICAL AGENTS RULED OUT: ICD-10-CM

## 2020-10-27 LAB — SARS-COV-2 RNA RESP QL NAA+PROBE: NOT DETECTED

## 2020-10-28 ENCOUNTER — LAB VISIT (OUTPATIENT)
Dept: URGENT CARE | Facility: CLINIC | Age: 60
End: 2020-10-28
Payer: MEDICAID

## 2020-10-28 DIAGNOSIS — Z03.818 ENCOUNTER FOR OBSERVATION FOR SUSPECTED EXPOSURE TO OTHER BIOLOGICAL AGENTS RULED OUT: ICD-10-CM

## 2020-10-28 LAB — SARS-COV-2 RNA RESP QL NAA+PROBE: NOT DETECTED

## 2020-10-28 PROCEDURE — U0003 INFECTIOUS AGENT DETECTION BY NUCLEIC ACID (DNA OR RNA); SEVERE ACUTE RESPIRATORY SYNDROME CORONAVIRUS 2 (SARS-COV-2) (CORONAVIRUS DISEASE [COVID-19]), AMPLIFIED PROBE TECHNIQUE, MAKING USE OF HIGH THROUGHPUT TECHNOLOGIES AS DESCRIBED BY CMS-2020-01-R: HCPCS

## 2020-10-29 DIAGNOSIS — Z03.818 ENCOUNTER FOR OBSERVATION FOR SUSPECTED EXPOSURE TO OTHER BIOLOGICAL AGENTS RULED OUT: ICD-10-CM

## 2020-10-30 ENCOUNTER — LAB VISIT (OUTPATIENT)
Dept: URGENT CARE | Facility: CLINIC | Age: 60
End: 2020-10-30
Payer: MEDICAID

## 2020-10-30 DIAGNOSIS — Z03.818 ENCOUNTER FOR OBSERVATION FOR SUSPECTED EXPOSURE TO OTHER BIOLOGICAL AGENTS RULED OUT: ICD-10-CM

## 2020-10-30 PROCEDURE — U0003 INFECTIOUS AGENT DETECTION BY NUCLEIC ACID (DNA OR RNA); SEVERE ACUTE RESPIRATORY SYNDROME CORONAVIRUS 2 (SARS-COV-2) (CORONAVIRUS DISEASE [COVID-19]), AMPLIFIED PROBE TECHNIQUE, MAKING USE OF HIGH THROUGHPUT TECHNOLOGIES AS DESCRIBED BY CMS-2020-01-R: HCPCS

## 2020-10-31 LAB — SARS-COV-2 RNA RESP QL NAA+PROBE: NOT DETECTED

## 2020-11-02 ENCOUNTER — LAB VISIT (OUTPATIENT)
Dept: URGENT CARE | Facility: CLINIC | Age: 60
End: 2020-11-02
Payer: MEDICAID

## 2020-11-02 DIAGNOSIS — Z03.818 ENCOUNTER FOR OBSERVATION FOR SUSPECTED EXPOSURE TO OTHER BIOLOGICAL AGENTS RULED OUT: ICD-10-CM

## 2020-11-02 PROCEDURE — U0003 INFECTIOUS AGENT DETECTION BY NUCLEIC ACID (DNA OR RNA); SEVERE ACUTE RESPIRATORY SYNDROME CORONAVIRUS 2 (SARS-COV-2) (CORONAVIRUS DISEASE [COVID-19]), AMPLIFIED PROBE TECHNIQUE, MAKING USE OF HIGH THROUGHPUT TECHNOLOGIES AS DESCRIBED BY CMS-2020-01-R: HCPCS

## 2020-11-03 DIAGNOSIS — Z03.818 ENCOUNTER FOR OBSERVATION FOR SUSPECTED EXPOSURE TO OTHER BIOLOGICAL AGENTS RULED OUT: ICD-10-CM

## 2020-11-03 LAB — SARS-COV-2 RNA RESP QL NAA+PROBE: NOT DETECTED

## 2020-11-05 DIAGNOSIS — Z03.818 ENCOUNTER FOR OBSERVATION FOR SUSPECTED EXPOSURE TO OTHER BIOLOGICAL AGENTS RULED OUT: ICD-10-CM

## 2020-11-09 ENCOUNTER — LAB VISIT (OUTPATIENT)
Dept: URGENT CARE | Facility: CLINIC | Age: 60
End: 2020-11-09
Payer: MEDICAID

## 2020-11-09 DIAGNOSIS — Z03.818 ENCOUNTER FOR OBSERVATION FOR SUSPECTED EXPOSURE TO OTHER BIOLOGICAL AGENTS RULED OUT: ICD-10-CM

## 2020-11-09 PROCEDURE — U0003 INFECTIOUS AGENT DETECTION BY NUCLEIC ACID (DNA OR RNA); SEVERE ACUTE RESPIRATORY SYNDROME CORONAVIRUS 2 (SARS-COV-2) (CORONAVIRUS DISEASE [COVID-19]), AMPLIFIED PROBE TECHNIQUE, MAKING USE OF HIGH THROUGHPUT TECHNOLOGIES AS DESCRIBED BY CMS-2020-01-R: HCPCS

## 2020-11-10 LAB — SARS-COV-2 RNA RESP QL NAA+PROBE: NOT DETECTED

## 2020-11-11 ENCOUNTER — LAB VISIT (OUTPATIENT)
Dept: URGENT CARE | Facility: CLINIC | Age: 60
End: 2020-11-11
Payer: MEDICAID

## 2020-11-11 DIAGNOSIS — Z03.818 ENCOUNTER FOR OBSERVATION FOR SUSPECTED EXPOSURE TO OTHER BIOLOGICAL AGENTS RULED OUT: ICD-10-CM

## 2020-11-11 LAB — SARS-COV-2 RNA RESP QL NAA+PROBE: NOT DETECTED

## 2020-11-11 PROCEDURE — U0003 INFECTIOUS AGENT DETECTION BY NUCLEIC ACID (DNA OR RNA); SEVERE ACUTE RESPIRATORY SYNDROME CORONAVIRUS 2 (SARS-COV-2) (CORONAVIRUS DISEASE [COVID-19]), AMPLIFIED PROBE TECHNIQUE, MAKING USE OF HIGH THROUGHPUT TECHNOLOGIES AS DESCRIBED BY CMS-2020-01-R: HCPCS

## 2020-11-13 ENCOUNTER — LAB VISIT (OUTPATIENT)
Dept: URGENT CARE | Facility: CLINIC | Age: 60
End: 2020-11-13
Payer: MEDICAID

## 2020-11-13 DIAGNOSIS — Z03.818 ENCOUNTER FOR OBSERVATION FOR SUSPECTED EXPOSURE TO OTHER BIOLOGICAL AGENTS RULED OUT: ICD-10-CM

## 2020-11-13 PROCEDURE — U0003 INFECTIOUS AGENT DETECTION BY NUCLEIC ACID (DNA OR RNA); SEVERE ACUTE RESPIRATORY SYNDROME CORONAVIRUS 2 (SARS-COV-2) (CORONAVIRUS DISEASE [COVID-19]), AMPLIFIED PROBE TECHNIQUE, MAKING USE OF HIGH THROUGHPUT TECHNOLOGIES AS DESCRIBED BY CMS-2020-01-R: HCPCS

## 2020-11-14 LAB — SARS-COV-2 RNA RESP QL NAA+PROBE: NOT DETECTED

## 2020-11-16 ENCOUNTER — LAB VISIT (OUTPATIENT)
Dept: URGENT CARE | Facility: CLINIC | Age: 60
End: 2020-11-16
Payer: MEDICAID

## 2020-11-16 DIAGNOSIS — Z03.818 ENCOUNTER FOR OBSERVATION FOR SUSPECTED EXPOSURE TO OTHER BIOLOGICAL AGENTS RULED OUT: ICD-10-CM

## 2020-11-16 LAB — SARS-COV-2 RNA RESP QL NAA+PROBE: NOT DETECTED

## 2020-11-16 PROCEDURE — U0003 INFECTIOUS AGENT DETECTION BY NUCLEIC ACID (DNA OR RNA); SEVERE ACUTE RESPIRATORY SYNDROME CORONAVIRUS 2 (SARS-COV-2) (CORONAVIRUS DISEASE [COVID-19]), AMPLIFIED PROBE TECHNIQUE, MAKING USE OF HIGH THROUGHPUT TECHNOLOGIES AS DESCRIBED BY CMS-2020-01-R: HCPCS

## 2020-11-18 ENCOUNTER — LAB VISIT (OUTPATIENT)
Dept: URGENT CARE | Facility: CLINIC | Age: 60
End: 2020-11-18
Payer: MEDICAID

## 2020-11-18 DIAGNOSIS — Z03.818 ENCOUNTER FOR OBSERVATION FOR SUSPECTED EXPOSURE TO OTHER BIOLOGICAL AGENTS RULED OUT: ICD-10-CM

## 2020-11-18 PROCEDURE — U0003 INFECTIOUS AGENT DETECTION BY NUCLEIC ACID (DNA OR RNA); SEVERE ACUTE RESPIRATORY SYNDROME CORONAVIRUS 2 (SARS-COV-2) (CORONAVIRUS DISEASE [COVID-19]), AMPLIFIED PROBE TECHNIQUE, MAKING USE OF HIGH THROUGHPUT TECHNOLOGIES AS DESCRIBED BY CMS-2020-01-R: HCPCS

## 2020-11-19 LAB — SARS-COV-2 RNA RESP QL NAA+PROBE: NOT DETECTED

## 2020-11-20 ENCOUNTER — LAB VISIT (OUTPATIENT)
Dept: URGENT CARE | Facility: CLINIC | Age: 60
End: 2020-11-20
Payer: MEDICAID

## 2020-11-20 DIAGNOSIS — Z03.818 ENCOUNTER FOR OBSERVATION FOR SUSPECTED EXPOSURE TO OTHER BIOLOGICAL AGENTS RULED OUT: ICD-10-CM

## 2020-11-20 PROCEDURE — U0003 INFECTIOUS AGENT DETECTION BY NUCLEIC ACID (DNA OR RNA); SEVERE ACUTE RESPIRATORY SYNDROME CORONAVIRUS 2 (SARS-COV-2) (CORONAVIRUS DISEASE [COVID-19]), AMPLIFIED PROBE TECHNIQUE, MAKING USE OF HIGH THROUGHPUT TECHNOLOGIES AS DESCRIBED BY CMS-2020-01-R: HCPCS

## 2020-11-21 LAB — SARS-COV-2 RNA RESP QL NAA+PROBE: NOT DETECTED

## 2020-11-23 ENCOUNTER — LAB VISIT (OUTPATIENT)
Dept: URGENT CARE | Facility: CLINIC | Age: 60
End: 2020-11-23
Payer: MEDICAID

## 2020-11-23 DIAGNOSIS — Z03.818 ENCOUNTER FOR OBSERVATION FOR SUSPECTED EXPOSURE TO OTHER BIOLOGICAL AGENTS RULED OUT: ICD-10-CM

## 2020-11-23 PROCEDURE — U0003 INFECTIOUS AGENT DETECTION BY NUCLEIC ACID (DNA OR RNA); SEVERE ACUTE RESPIRATORY SYNDROME CORONAVIRUS 2 (SARS-COV-2) (CORONAVIRUS DISEASE [COVID-19]), AMPLIFIED PROBE TECHNIQUE, MAKING USE OF HIGH THROUGHPUT TECHNOLOGIES AS DESCRIBED BY CMS-2020-01-R: HCPCS

## 2020-11-24 LAB — SARS-COV-2 RNA RESP QL NAA+PROBE: NOT DETECTED

## 2020-11-25 ENCOUNTER — LAB VISIT (OUTPATIENT)
Dept: URGENT CARE | Facility: CLINIC | Age: 60
End: 2020-11-25
Payer: MEDICAID

## 2020-11-25 DIAGNOSIS — Z03.818 ENCOUNTER FOR OBSERVATION FOR SUSPECTED EXPOSURE TO OTHER BIOLOGICAL AGENTS RULED OUT: ICD-10-CM

## 2020-11-25 PROCEDURE — U0003 INFECTIOUS AGENT DETECTION BY NUCLEIC ACID (DNA OR RNA); SEVERE ACUTE RESPIRATORY SYNDROME CORONAVIRUS 2 (SARS-COV-2) (CORONAVIRUS DISEASE [COVID-19]), AMPLIFIED PROBE TECHNIQUE, MAKING USE OF HIGH THROUGHPUT TECHNOLOGIES AS DESCRIBED BY CMS-2020-01-R: HCPCS

## 2020-11-26 LAB — SARS-COV-2 RNA RESP QL NAA+PROBE: NOT DETECTED

## 2020-11-27 ENCOUNTER — LAB VISIT (OUTPATIENT)
Dept: URGENT CARE | Facility: CLINIC | Age: 60
End: 2020-11-27
Payer: MEDICAID

## 2020-11-27 DIAGNOSIS — Z03.818 ENCOUNTER FOR OBSERVATION FOR SUSPECTED EXPOSURE TO OTHER BIOLOGICAL AGENTS RULED OUT: ICD-10-CM

## 2020-11-27 PROCEDURE — U0003 INFECTIOUS AGENT DETECTION BY NUCLEIC ACID (DNA OR RNA); SEVERE ACUTE RESPIRATORY SYNDROME CORONAVIRUS 2 (SARS-COV-2) (CORONAVIRUS DISEASE [COVID-19]), AMPLIFIED PROBE TECHNIQUE, MAKING USE OF HIGH THROUGHPUT TECHNOLOGIES AS DESCRIBED BY CMS-2020-01-R: HCPCS

## 2020-11-28 LAB — SARS-COV-2 RNA RESP QL NAA+PROBE: NOT DETECTED

## 2020-11-30 ENCOUNTER — LAB VISIT (OUTPATIENT)
Dept: URGENT CARE | Facility: CLINIC | Age: 60
End: 2020-11-30
Payer: MEDICAID

## 2020-11-30 DIAGNOSIS — Z03.818 ENCOUNTER FOR OBSERVATION FOR SUSPECTED EXPOSURE TO OTHER BIOLOGICAL AGENTS RULED OUT: ICD-10-CM

## 2020-11-30 PROCEDURE — U0003 INFECTIOUS AGENT DETECTION BY NUCLEIC ACID (DNA OR RNA); SEVERE ACUTE RESPIRATORY SYNDROME CORONAVIRUS 2 (SARS-COV-2) (CORONAVIRUS DISEASE [COVID-19]), AMPLIFIED PROBE TECHNIQUE, MAKING USE OF HIGH THROUGHPUT TECHNOLOGIES AS DESCRIBED BY CMS-2020-01-R: HCPCS

## 2020-12-01 ENCOUNTER — OFFICE VISIT (OUTPATIENT)
Dept: URGENT CARE | Facility: CLINIC | Age: 60
End: 2020-12-01
Payer: MEDICAID

## 2020-12-01 VITALS
SYSTOLIC BLOOD PRESSURE: 160 MMHG | OXYGEN SATURATION: 97 % | HEIGHT: 70 IN | RESPIRATION RATE: 18 BRPM | BODY MASS INDEX: 24.34 KG/M2 | HEART RATE: 94 BPM | DIASTOLIC BLOOD PRESSURE: 101 MMHG | WEIGHT: 170 LBS | TEMPERATURE: 98 F

## 2020-12-01 DIAGNOSIS — Z01.84 ENCOUNTER FOR ANTIBODY RESPONSE EXAMINATION: ICD-10-CM

## 2020-12-01 DIAGNOSIS — Z03.818 ENCNTR FOR OBS FOR SUSP EXPSR TO OTH BIOLG AGENTS RULED OUT: Primary | ICD-10-CM

## 2020-12-01 LAB — SARS-COV-2 RNA RESP QL NAA+PROBE: NOT DETECTED

## 2020-12-01 PROCEDURE — 99211 OFF/OP EST MAY X REQ PHY/QHP: CPT | Mod: S$GLB,,, | Performed by: FAMILY MEDICINE

## 2020-12-01 PROCEDURE — 86769 SARS-COV-2 COVID-19 ANTIBODY: CPT

## 2020-12-01 PROCEDURE — 99211 PR OFFICE/OUTPT VISIT, EST, LEVL I: ICD-10-PCS | Mod: S$GLB,,, | Performed by: FAMILY MEDICINE

## 2020-12-01 NOTE — PROGRESS NOTES
"Subjective:       Patient ID: Daniel Good is a 59 y.o. male.    Vitals:  height is 5' 10" (1.778 m) and weight is 77.1 kg (170 lb). His temperature is 98.3 °F (36.8 °C). His blood pressure is 160/101 (abnormal) and his pulse is 94. His respiration is 18 and oxygen saturation is 97%.     Chief Complaint: COVID-19 Concerns    Pt asking for antibody test, pt work for [employer]    Other  This is a new problem. The current episode started today. Nothing aggravates the symptoms. He has tried nothing for the symptoms. The treatment provided no relief.     ROS    Objective:      Physical Exam      Assessment:       1. Encntr for obs for susp expsr to oth biolg agents ruled out    2. Encounter for antibody response examination        Plan:         Encntr for obs for susp expsr to oth biolg agents ruled out    Encounter for antibody response examination  -     COVID-19 (SARS CoV-2) IgG Antibody           "

## 2020-12-02 ENCOUNTER — LAB VISIT (OUTPATIENT)
Dept: URGENT CARE | Facility: CLINIC | Age: 60
End: 2020-12-02
Payer: MEDICAID

## 2020-12-02 DIAGNOSIS — Z03.818 ENCOUNTER FOR OBSERVATION FOR SUSPECTED EXPOSURE TO OTHER BIOLOGICAL AGENTS RULED OUT: ICD-10-CM

## 2020-12-02 LAB — SARS-COV-2 IGG SERPLBLD QL IA.RAPID: NEGATIVE

## 2020-12-02 PROCEDURE — U0003 INFECTIOUS AGENT DETECTION BY NUCLEIC ACID (DNA OR RNA); SEVERE ACUTE RESPIRATORY SYNDROME CORONAVIRUS 2 (SARS-COV-2) (CORONAVIRUS DISEASE [COVID-19]), AMPLIFIED PROBE TECHNIQUE, MAKING USE OF HIGH THROUGHPUT TECHNOLOGIES AS DESCRIBED BY CMS-2020-01-R: HCPCS

## 2020-12-02 NOTE — PROGRESS NOTES
"Encounter Date: 12/19/2019       History     Chief Complaint   Patient presents with    Joint Pain     pt reports "pain in my joints. I have arthritis. It's been going on for a few days." Pt reports pain to right arm. Pt also reports SOB that began this morning while getting dressed for work. Reports worsening of symptoms with deep breathing.    Shortness of Breath     59-year-old female presents with chief complaint shortness of breath chest pain and neck pain.  Patient reports the pain is worse whenever she takes a deep breath or moves.  Patient reports does have a history of thyroid disease and is concerned about that causing her chest pain.  Patient presents anxious appearance.  Denies any fever chills.  Denies any nausea vomiting.        Review of patient's allergies indicates:  No Known Allergies  Past Medical History:   Diagnosis Date    Arthritis     Disc degeneration     Osteoarthritis     Thyroid disease      Past Surgical History:   Procedure Laterality Date    FOOT FRACTURE SURGERY Left     HYSTERECTOMY      RECONSTRUCTION OF NOSE       Family History   Problem Relation Age of Onset    Asthma Mother     Cancer Mother     No Known Problems Father     No Known Problems Son      Social History     Tobacco Use    Smoking status: Never Smoker    Smokeless tobacco: Never Used   Substance Use Topics    Alcohol use: No    Drug use: No     Review of Systems   Constitutional: Negative for chills and fever.   Endocrine: Positive for cold intolerance.   Musculoskeletal: Positive for arthralgias.   All other systems reviewed and are negative.      Physical Exam     Initial Vitals [12/19/19 0629]   BP Pulse Resp Temp SpO2   (!) 149/70 71 20 97.9 °F (36.6 °C) 96 %      MAP       --         Physical Exam    Nursing note and vitals reviewed.  Constitutional: She appears well-developed and well-nourished. She is not diaphoretic. No distress.   HENT:   Head: Normocephalic and atraumatic.   Eyes: EOM are " Swab collected     normal. Pupils are equal, round, and reactive to light.   Neck: Normal range of motion. Neck supple.   Cardiovascular: Normal rate, regular rhythm and normal heart sounds.   Pulmonary/Chest: Breath sounds normal. She exhibits tenderness.   Abdominal: Soft.   Musculoskeletal: Normal range of motion.   Neurological: She is alert and oriented to person, place, and time. She has normal strength. GCS score is 15. GCS eye subscore is 4. GCS verbal subscore is 5. GCS motor subscore is 6.   Skin: Skin is warm. Capillary refill takes less than 2 seconds.   Psychiatric: She has a normal mood and affect. Her behavior is normal. Thought content normal.         ED Course   Procedures  Labs Reviewed   CBC W/ AUTO DIFFERENTIAL - Abnormal; Notable for the following components:       Result Value    Mean Corpuscular Hemoglobin 32.5 (*)     Gran # (ANC) 1.4 (*)     Gran% 33.7 (*)     Lymph% 53.6 (*)     All other components within normal limits   COMPREHENSIVE METABOLIC PANEL - Abnormal; Notable for the following components:    Anion Gap 4 (*)     All other components within normal limits   TSH - Abnormal; Notable for the following components:    TSH 12.400 (*)     All other components within normal limits   TROPONIN I   NT-PRO NATRIURETIC PEPTIDE   T4, FREE   TROPONIN I     EKG Readings: (Independently Interpreted)   Initial Reading: No STEMI.   Normal sinus rhythm at 69 beats per minute normal WI normal QRS nonspecific ST T-wave abnormality noted abnormal EKG       Imaging Results          X-Ray Chest AP Portable (Final result)  Result time 12/19/19 08:44:29    Final result by TREVOR Rodriguez Sr., MD (12/19/19 08:44:29)                 Impression:      Normal study.      Electronically signed by: Neo Rodriguez MD  Date:    12/19/2019  Time:    08:44             Narrative:    EXAMINATION:  XR CHEST AP PORTABLE    CLINICAL HISTORY:  Chest Pain;    COMPARISON:  05/12/2018    FINDINGS:  The size of the heart is normal. The lungs are  clear. There is no pneumothorax.  The costophrenic angles are sharp.                                 Medical Decision Making:   Differential Diagnosis:   Costochondritis, arthritis, arthralgia, pneumonia, rib fracture, mi  Clinical Tests:   Lab Tests: Ordered and Reviewed  The following lab test(s) were unremarkable: CMP, CBC, Troponin and BNP  Radiological Study: Ordered and Reviewed  ED Management:  Patient seen assess IV Toradol administered in addition to oral aspirin patient's pain symptoms improved symptoms most consistent with arthralgia also patient has hypothyroidism which will initiate Synthroid for and have follow-up within 2 weeks with PCP for recheck.  Patient does report a history of thyroid problems in the past but she says she has always been able to manage it without medication.                                 Clinical Impression:       ICD-10-CM ICD-9-CM   1. Polyarthralgia M25.50 719.49   2. Chest pain R07.9 786.50   3. Hypothyroidism, unspecified type E03.9 244.9                             Tarun Patel MD  12/19/19 0805

## 2020-12-03 LAB — SARS-COV-2 RNA RESP QL NAA+PROBE: NOT DETECTED

## 2020-12-04 ENCOUNTER — LAB VISIT (OUTPATIENT)
Dept: URGENT CARE | Facility: CLINIC | Age: 60
End: 2020-12-04
Payer: MEDICAID

## 2020-12-04 DIAGNOSIS — Z03.818 ENCOUNTER FOR OBSERVATION FOR SUSPECTED EXPOSURE TO OTHER BIOLOGICAL AGENTS RULED OUT: ICD-10-CM

## 2020-12-04 PROCEDURE — U0003 INFECTIOUS AGENT DETECTION BY NUCLEIC ACID (DNA OR RNA); SEVERE ACUTE RESPIRATORY SYNDROME CORONAVIRUS 2 (SARS-COV-2) (CORONAVIRUS DISEASE [COVID-19]), AMPLIFIED PROBE TECHNIQUE, MAKING USE OF HIGH THROUGHPUT TECHNOLOGIES AS DESCRIBED BY CMS-2020-01-R: HCPCS

## 2020-12-05 LAB — SARS-COV-2 RNA RESP QL NAA+PROBE: NOT DETECTED

## 2020-12-07 ENCOUNTER — LAB VISIT (OUTPATIENT)
Dept: URGENT CARE | Facility: CLINIC | Age: 60
End: 2020-12-07
Payer: MEDICAID

## 2020-12-07 DIAGNOSIS — Z03.818 ENCOUNTER FOR OBSERVATION FOR SUSPECTED EXPOSURE TO OTHER BIOLOGICAL AGENTS RULED OUT: ICD-10-CM

## 2020-12-07 PROCEDURE — U0003 INFECTIOUS AGENT DETECTION BY NUCLEIC ACID (DNA OR RNA); SEVERE ACUTE RESPIRATORY SYNDROME CORONAVIRUS 2 (SARS-COV-2) (CORONAVIRUS DISEASE [COVID-19]), AMPLIFIED PROBE TECHNIQUE, MAKING USE OF HIGH THROUGHPUT TECHNOLOGIES AS DESCRIBED BY CMS-2020-01-R: HCPCS

## 2020-12-08 LAB — SARS-COV-2 RNA RESP QL NAA+PROBE: NOT DETECTED

## 2020-12-09 ENCOUNTER — LAB VISIT (OUTPATIENT)
Dept: URGENT CARE | Facility: CLINIC | Age: 60
End: 2020-12-09
Payer: MEDICAID

## 2020-12-09 DIAGNOSIS — Z03.818 ENCOUNTER FOR OBSERVATION FOR SUSPECTED EXPOSURE TO OTHER BIOLOGICAL AGENTS RULED OUT: ICD-10-CM

## 2020-12-09 PROCEDURE — U0003 INFECTIOUS AGENT DETECTION BY NUCLEIC ACID (DNA OR RNA); SEVERE ACUTE RESPIRATORY SYNDROME CORONAVIRUS 2 (SARS-COV-2) (CORONAVIRUS DISEASE [COVID-19]), AMPLIFIED PROBE TECHNIQUE, MAKING USE OF HIGH THROUGHPUT TECHNOLOGIES AS DESCRIBED BY CMS-2020-01-R: HCPCS

## 2020-12-10 LAB — SARS-COV-2 RNA RESP QL NAA+PROBE: NOT DETECTED

## 2020-12-11 ENCOUNTER — LAB VISIT (OUTPATIENT)
Dept: URGENT CARE | Facility: CLINIC | Age: 60
End: 2020-12-11
Payer: MEDICAID

## 2020-12-11 DIAGNOSIS — Z03.818 ENCOUNTER FOR OBSERVATION FOR SUSPECTED EXPOSURE TO OTHER BIOLOGICAL AGENTS RULED OUT: ICD-10-CM

## 2020-12-11 PROCEDURE — U0003 INFECTIOUS AGENT DETECTION BY NUCLEIC ACID (DNA OR RNA); SEVERE ACUTE RESPIRATORY SYNDROME CORONAVIRUS 2 (SARS-COV-2) (CORONAVIRUS DISEASE [COVID-19]), AMPLIFIED PROBE TECHNIQUE, MAKING USE OF HIGH THROUGHPUT TECHNOLOGIES AS DESCRIBED BY CMS-2020-01-R: HCPCS

## 2020-12-13 LAB — SARS-COV-2 RNA RESP QL NAA+PROBE: NOT DETECTED

## 2020-12-14 ENCOUNTER — LAB VISIT (OUTPATIENT)
Dept: URGENT CARE | Facility: CLINIC | Age: 60
End: 2020-12-14
Payer: MEDICAID

## 2020-12-14 DIAGNOSIS — Z03.818 ENCOUNTER FOR OBSERVATION FOR SUSPECTED EXPOSURE TO OTHER BIOLOGICAL AGENTS RULED OUT: ICD-10-CM

## 2020-12-14 PROCEDURE — U0003 INFECTIOUS AGENT DETECTION BY NUCLEIC ACID (DNA OR RNA); SEVERE ACUTE RESPIRATORY SYNDROME CORONAVIRUS 2 (SARS-COV-2) (CORONAVIRUS DISEASE [COVID-19]), AMPLIFIED PROBE TECHNIQUE, MAKING USE OF HIGH THROUGHPUT TECHNOLOGIES AS DESCRIBED BY CMS-2020-01-R: HCPCS

## 2020-12-15 LAB — SARS-COV-2 RNA RESP QL NAA+PROBE: NOT DETECTED

## 2020-12-16 ENCOUNTER — LAB VISIT (OUTPATIENT)
Dept: URGENT CARE | Facility: CLINIC | Age: 60
End: 2020-12-16
Payer: MEDICAID

## 2020-12-16 DIAGNOSIS — Z03.818 ENCOUNTER FOR OBSERVATION FOR SUSPECTED EXPOSURE TO OTHER BIOLOGICAL AGENTS RULED OUT: ICD-10-CM

## 2020-12-16 PROCEDURE — U0003 INFECTIOUS AGENT DETECTION BY NUCLEIC ACID (DNA OR RNA); SEVERE ACUTE RESPIRATORY SYNDROME CORONAVIRUS 2 (SARS-COV-2) (CORONAVIRUS DISEASE [COVID-19]), AMPLIFIED PROBE TECHNIQUE, MAKING USE OF HIGH THROUGHPUT TECHNOLOGIES AS DESCRIBED BY CMS-2020-01-R: HCPCS

## 2020-12-17 LAB — SARS-COV-2 RNA RESP QL NAA+PROBE: NOT DETECTED

## 2020-12-18 ENCOUNTER — LAB VISIT (OUTPATIENT)
Dept: URGENT CARE | Facility: CLINIC | Age: 60
End: 2020-12-18
Payer: MEDICAID

## 2020-12-18 DIAGNOSIS — Z03.818 ENCOUNTER FOR OBSERVATION FOR SUSPECTED EXPOSURE TO OTHER BIOLOGICAL AGENTS RULED OUT: ICD-10-CM

## 2020-12-18 PROCEDURE — U0003 INFECTIOUS AGENT DETECTION BY NUCLEIC ACID (DNA OR RNA); SEVERE ACUTE RESPIRATORY SYNDROME CORONAVIRUS 2 (SARS-COV-2) (CORONAVIRUS DISEASE [COVID-19]), AMPLIFIED PROBE TECHNIQUE, MAKING USE OF HIGH THROUGHPUT TECHNOLOGIES AS DESCRIBED BY CMS-2020-01-R: HCPCS

## 2020-12-19 LAB — SARS-COV-2 RNA RESP QL NAA+PROBE: NOT DETECTED

## 2020-12-28 ENCOUNTER — LAB VISIT (OUTPATIENT)
Dept: URGENT CARE | Facility: CLINIC | Age: 60
End: 2020-12-28
Payer: MEDICAID

## 2020-12-28 DIAGNOSIS — Z03.818 ENCOUNTER FOR OBSERVATION FOR SUSPECTED EXPOSURE TO OTHER BIOLOGICAL AGENTS RULED OUT: ICD-10-CM

## 2020-12-28 LAB — SARS-COV-2 RNA RESP QL NAA+PROBE: NOT DETECTED

## 2020-12-28 PROCEDURE — U0003 INFECTIOUS AGENT DETECTION BY NUCLEIC ACID (DNA OR RNA); SEVERE ACUTE RESPIRATORY SYNDROME CORONAVIRUS 2 (SARS-COV-2) (CORONAVIRUS DISEASE [COVID-19]), AMPLIFIED PROBE TECHNIQUE, MAKING USE OF HIGH THROUGHPUT TECHNOLOGIES AS DESCRIBED BY CMS-2020-01-R: HCPCS

## 2021-01-02 ENCOUNTER — LAB VISIT (OUTPATIENT)
Dept: URGENT CARE | Facility: CLINIC | Age: 61
End: 2021-01-02
Payer: MEDICAID

## 2021-01-02 DIAGNOSIS — Z03.818 ENCOUNTER FOR OBSERVATION FOR SUSPECTED EXPOSURE TO OTHER BIOLOGICAL AGENTS RULED OUT: ICD-10-CM

## 2021-01-02 PROCEDURE — U0003 INFECTIOUS AGENT DETECTION BY NUCLEIC ACID (DNA OR RNA); SEVERE ACUTE RESPIRATORY SYNDROME CORONAVIRUS 2 (SARS-COV-2) (CORONAVIRUS DISEASE [COVID-19]), AMPLIFIED PROBE TECHNIQUE, MAKING USE OF HIGH THROUGHPUT TECHNOLOGIES AS DESCRIBED BY CMS-2020-01-R: HCPCS

## 2021-01-03 LAB — SARS-COV-2 RNA RESP QL NAA+PROBE: NOT DETECTED

## 2021-01-04 ENCOUNTER — LAB VISIT (OUTPATIENT)
Dept: URGENT CARE | Facility: CLINIC | Age: 61
End: 2021-01-04
Payer: MEDICAID

## 2021-01-04 DIAGNOSIS — Z03.818 ENCOUNTER FOR OBSERVATION FOR SUSPECTED EXPOSURE TO OTHER BIOLOGICAL AGENTS RULED OUT: ICD-10-CM

## 2021-01-04 PROCEDURE — U0003 INFECTIOUS AGENT DETECTION BY NUCLEIC ACID (DNA OR RNA); SEVERE ACUTE RESPIRATORY SYNDROME CORONAVIRUS 2 (SARS-COV-2) (CORONAVIRUS DISEASE [COVID-19]), AMPLIFIED PROBE TECHNIQUE, MAKING USE OF HIGH THROUGHPUT TECHNOLOGIES AS DESCRIBED BY CMS-2020-01-R: HCPCS

## 2021-01-05 LAB — SARS-COV-2 RNA RESP QL NAA+PROBE: NOT DETECTED

## 2021-01-06 ENCOUNTER — LAB VISIT (OUTPATIENT)
Dept: URGENT CARE | Facility: CLINIC | Age: 61
End: 2021-01-06
Payer: MEDICAID

## 2021-01-06 DIAGNOSIS — Z03.818 ENCOUNTER FOR OBSERVATION FOR SUSPECTED EXPOSURE TO OTHER BIOLOGICAL AGENTS RULED OUT: ICD-10-CM

## 2021-01-06 PROCEDURE — U0003 INFECTIOUS AGENT DETECTION BY NUCLEIC ACID (DNA OR RNA); SEVERE ACUTE RESPIRATORY SYNDROME CORONAVIRUS 2 (SARS-COV-2) (CORONAVIRUS DISEASE [COVID-19]), AMPLIFIED PROBE TECHNIQUE, MAKING USE OF HIGH THROUGHPUT TECHNOLOGIES AS DESCRIBED BY CMS-2020-01-R: HCPCS

## 2021-01-07 LAB — SARS-COV-2 RNA RESP QL NAA+PROBE: NOT DETECTED

## 2021-01-08 ENCOUNTER — LAB VISIT (OUTPATIENT)
Dept: URGENT CARE | Facility: CLINIC | Age: 61
End: 2021-01-08
Payer: MEDICAID

## 2021-01-08 DIAGNOSIS — Z03.818 ENCOUNTER FOR OBSERVATION FOR SUSPECTED EXPOSURE TO OTHER BIOLOGICAL AGENTS RULED OUT: ICD-10-CM

## 2021-01-08 PROCEDURE — U0003 INFECTIOUS AGENT DETECTION BY NUCLEIC ACID (DNA OR RNA); SEVERE ACUTE RESPIRATORY SYNDROME CORONAVIRUS 2 (SARS-COV-2) (CORONAVIRUS DISEASE [COVID-19]), AMPLIFIED PROBE TECHNIQUE, MAKING USE OF HIGH THROUGHPUT TECHNOLOGIES AS DESCRIBED BY CMS-2020-01-R: HCPCS

## 2021-01-09 LAB — SARS-COV-2 RNA RESP QL NAA+PROBE: NOT DETECTED

## 2021-01-11 ENCOUNTER — LAB VISIT (OUTPATIENT)
Dept: URGENT CARE | Facility: CLINIC | Age: 61
End: 2021-01-11
Payer: MEDICAID

## 2021-01-11 DIAGNOSIS — Z20.822 ENCOUNTER FOR LABORATORY TESTING FOR COVID-19 VIRUS: ICD-10-CM

## 2021-01-11 LAB — SARS-COV-2 RNA RESP QL NAA+PROBE: NOT DETECTED

## 2021-01-11 PROCEDURE — U0003 INFECTIOUS AGENT DETECTION BY NUCLEIC ACID (DNA OR RNA); SEVERE ACUTE RESPIRATORY SYNDROME CORONAVIRUS 2 (SARS-COV-2) (CORONAVIRUS DISEASE [COVID-19]), AMPLIFIED PROBE TECHNIQUE, MAKING USE OF HIGH THROUGHPUT TECHNOLOGIES AS DESCRIBED BY CMS-2020-01-R: HCPCS

## 2021-01-13 ENCOUNTER — LAB VISIT (OUTPATIENT)
Dept: URGENT CARE | Facility: CLINIC | Age: 61
End: 2021-01-13
Payer: MEDICAID

## 2021-01-13 DIAGNOSIS — Z20.822 ENCOUNTER FOR LABORATORY TESTING FOR COVID-19 VIRUS: ICD-10-CM

## 2021-01-13 PROCEDURE — U0003 INFECTIOUS AGENT DETECTION BY NUCLEIC ACID (DNA OR RNA); SEVERE ACUTE RESPIRATORY SYNDROME CORONAVIRUS 2 (SARS-COV-2) (CORONAVIRUS DISEASE [COVID-19]), AMPLIFIED PROBE TECHNIQUE, MAKING USE OF HIGH THROUGHPUT TECHNOLOGIES AS DESCRIBED BY CMS-2020-01-R: HCPCS

## 2021-01-14 LAB — SARS-COV-2 RNA RESP QL NAA+PROBE: NOT DETECTED

## 2021-01-15 ENCOUNTER — LAB VISIT (OUTPATIENT)
Dept: URGENT CARE | Facility: CLINIC | Age: 61
End: 2021-01-15
Payer: MEDICAID

## 2021-01-15 DIAGNOSIS — Z20.822 ENCOUNTER FOR LABORATORY TESTING FOR COVID-19 VIRUS: ICD-10-CM

## 2021-01-15 PROCEDURE — U0003 INFECTIOUS AGENT DETECTION BY NUCLEIC ACID (DNA OR RNA); SEVERE ACUTE RESPIRATORY SYNDROME CORONAVIRUS 2 (SARS-COV-2) (CORONAVIRUS DISEASE [COVID-19]), AMPLIFIED PROBE TECHNIQUE, MAKING USE OF HIGH THROUGHPUT TECHNOLOGIES AS DESCRIBED BY CMS-2020-01-R: HCPCS

## 2021-01-16 LAB — SARS-COV-2 RNA RESP QL NAA+PROBE: NOT DETECTED

## 2021-01-18 ENCOUNTER — LAB VISIT (OUTPATIENT)
Dept: URGENT CARE | Facility: CLINIC | Age: 61
End: 2021-01-18
Payer: MEDICAID

## 2021-01-18 DIAGNOSIS — Z20.822 ENCOUNTER FOR LABORATORY TESTING FOR COVID-19 VIRUS: ICD-10-CM

## 2021-01-18 LAB — SARS-COV-2 RNA RESP QL NAA+PROBE: NOT DETECTED

## 2021-01-18 PROCEDURE — U0003 INFECTIOUS AGENT DETECTION BY NUCLEIC ACID (DNA OR RNA); SEVERE ACUTE RESPIRATORY SYNDROME CORONAVIRUS 2 (SARS-COV-2) (CORONAVIRUS DISEASE [COVID-19]), AMPLIFIED PROBE TECHNIQUE, MAKING USE OF HIGH THROUGHPUT TECHNOLOGIES AS DESCRIBED BY CMS-2020-01-R: HCPCS

## 2021-01-20 ENCOUNTER — LAB VISIT (OUTPATIENT)
Dept: URGENT CARE | Facility: CLINIC | Age: 61
End: 2021-01-20
Payer: MEDICAID

## 2021-01-20 DIAGNOSIS — Z20.822 ENCOUNTER FOR LABORATORY TESTING FOR COVID-19 VIRUS: ICD-10-CM

## 2021-01-20 LAB — SARS-COV-2 RNA RESP QL NAA+PROBE: NOT DETECTED

## 2021-01-20 PROCEDURE — U0003 INFECTIOUS AGENT DETECTION BY NUCLEIC ACID (DNA OR RNA); SEVERE ACUTE RESPIRATORY SYNDROME CORONAVIRUS 2 (SARS-COV-2) (CORONAVIRUS DISEASE [COVID-19]), AMPLIFIED PROBE TECHNIQUE, MAKING USE OF HIGH THROUGHPUT TECHNOLOGIES AS DESCRIBED BY CMS-2020-01-R: HCPCS

## 2021-01-22 ENCOUNTER — LAB VISIT (OUTPATIENT)
Dept: URGENT CARE | Facility: CLINIC | Age: 61
End: 2021-01-22
Payer: MEDICAID

## 2021-01-22 DIAGNOSIS — Z20.822 ENCOUNTER FOR LABORATORY TESTING FOR COVID-19 VIRUS: ICD-10-CM

## 2021-01-22 PROCEDURE — U0003 INFECTIOUS AGENT DETECTION BY NUCLEIC ACID (DNA OR RNA); SEVERE ACUTE RESPIRATORY SYNDROME CORONAVIRUS 2 (SARS-COV-2) (CORONAVIRUS DISEASE [COVID-19]), AMPLIFIED PROBE TECHNIQUE, MAKING USE OF HIGH THROUGHPUT TECHNOLOGIES AS DESCRIBED BY CMS-2020-01-R: HCPCS

## 2021-01-23 LAB — SARS-COV-2 RNA RESP QL NAA+PROBE: NOT DETECTED

## 2021-01-25 ENCOUNTER — LAB VISIT (OUTPATIENT)
Dept: URGENT CARE | Facility: CLINIC | Age: 61
End: 2021-01-25
Payer: MEDICAID

## 2021-01-25 DIAGNOSIS — Z20.822 ENCOUNTER FOR LABORATORY TESTING FOR COVID-19 VIRUS: ICD-10-CM

## 2021-01-25 PROCEDURE — U0003 INFECTIOUS AGENT DETECTION BY NUCLEIC ACID (DNA OR RNA); SEVERE ACUTE RESPIRATORY SYNDROME CORONAVIRUS 2 (SARS-COV-2) (CORONAVIRUS DISEASE [COVID-19]), AMPLIFIED PROBE TECHNIQUE, MAKING USE OF HIGH THROUGHPUT TECHNOLOGIES AS DESCRIBED BY CMS-2020-01-R: HCPCS

## 2021-01-26 LAB — SARS-COV-2 RNA RESP QL NAA+PROBE: NOT DETECTED

## 2021-01-27 ENCOUNTER — LAB VISIT (OUTPATIENT)
Dept: URGENT CARE | Facility: CLINIC | Age: 61
End: 2021-01-27
Payer: MEDICAID

## 2021-01-27 DIAGNOSIS — Z20.822 ENCOUNTER FOR LABORATORY TESTING FOR COVID-19 VIRUS: ICD-10-CM

## 2021-01-27 PROCEDURE — U0003 INFECTIOUS AGENT DETECTION BY NUCLEIC ACID (DNA OR RNA); SEVERE ACUTE RESPIRATORY SYNDROME CORONAVIRUS 2 (SARS-COV-2) (CORONAVIRUS DISEASE [COVID-19]), AMPLIFIED PROBE TECHNIQUE, MAKING USE OF HIGH THROUGHPUT TECHNOLOGIES AS DESCRIBED BY CMS-2020-01-R: HCPCS

## 2021-01-28 LAB — SARS-COV-2 RNA RESP QL NAA+PROBE: NOT DETECTED

## 2021-01-29 ENCOUNTER — LAB VISIT (OUTPATIENT)
Dept: URGENT CARE | Facility: CLINIC | Age: 61
End: 2021-01-29
Payer: MEDICAID

## 2021-01-29 DIAGNOSIS — Z20.822 ENCOUNTER FOR LABORATORY TESTING FOR COVID-19 VIRUS: ICD-10-CM

## 2021-01-29 PROCEDURE — U0003 INFECTIOUS AGENT DETECTION BY NUCLEIC ACID (DNA OR RNA); SEVERE ACUTE RESPIRATORY SYNDROME CORONAVIRUS 2 (SARS-COV-2) (CORONAVIRUS DISEASE [COVID-19]), AMPLIFIED PROBE TECHNIQUE, MAKING USE OF HIGH THROUGHPUT TECHNOLOGIES AS DESCRIBED BY CMS-2020-01-R: HCPCS

## 2021-01-30 LAB — SARS-COV-2 RNA RESP QL NAA+PROBE: NOT DETECTED

## 2021-02-01 ENCOUNTER — LAB VISIT (OUTPATIENT)
Dept: URGENT CARE | Facility: CLINIC | Age: 61
End: 2021-02-01
Payer: MEDICAID

## 2021-02-01 DIAGNOSIS — Z20.822 ENCOUNTER FOR LABORATORY TESTING FOR COVID-19 VIRUS: ICD-10-CM

## 2021-02-01 PROCEDURE — U0003 INFECTIOUS AGENT DETECTION BY NUCLEIC ACID (DNA OR RNA); SEVERE ACUTE RESPIRATORY SYNDROME CORONAVIRUS 2 (SARS-COV-2) (CORONAVIRUS DISEASE [COVID-19]), AMPLIFIED PROBE TECHNIQUE, MAKING USE OF HIGH THROUGHPUT TECHNOLOGIES AS DESCRIBED BY CMS-2020-01-R: HCPCS

## 2021-02-02 LAB — SARS-COV-2 RNA RESP QL NAA+PROBE: NOT DETECTED

## 2021-02-03 ENCOUNTER — LAB VISIT (OUTPATIENT)
Dept: URGENT CARE | Facility: CLINIC | Age: 61
End: 2021-02-03
Payer: MEDICAID

## 2021-02-03 DIAGNOSIS — Z20.822 ENCOUNTER FOR LABORATORY TESTING FOR COVID-19 VIRUS: ICD-10-CM

## 2021-02-03 LAB — SARS-COV-2 RNA RESP QL NAA+PROBE: NOT DETECTED

## 2021-02-03 PROCEDURE — U0003 INFECTIOUS AGENT DETECTION BY NUCLEIC ACID (DNA OR RNA); SEVERE ACUTE RESPIRATORY SYNDROME CORONAVIRUS 2 (SARS-COV-2) (CORONAVIRUS DISEASE [COVID-19]), AMPLIFIED PROBE TECHNIQUE, MAKING USE OF HIGH THROUGHPUT TECHNOLOGIES AS DESCRIBED BY CMS-2020-01-R: HCPCS

## 2021-02-05 ENCOUNTER — LAB VISIT (OUTPATIENT)
Dept: URGENT CARE | Facility: CLINIC | Age: 61
End: 2021-02-05
Payer: MEDICAID

## 2021-02-05 DIAGNOSIS — Z20.822 ENCOUNTER FOR LABORATORY TESTING FOR COVID-19 VIRUS: ICD-10-CM

## 2021-02-05 PROCEDURE — U0003 INFECTIOUS AGENT DETECTION BY NUCLEIC ACID (DNA OR RNA); SEVERE ACUTE RESPIRATORY SYNDROME CORONAVIRUS 2 (SARS-COV-2) (CORONAVIRUS DISEASE [COVID-19]), AMPLIFIED PROBE TECHNIQUE, MAKING USE OF HIGH THROUGHPUT TECHNOLOGIES AS DESCRIBED BY CMS-2020-01-R: HCPCS

## 2021-02-06 LAB — SARS-COV-2 RNA RESP QL NAA+PROBE: NOT DETECTED

## 2021-02-08 ENCOUNTER — LAB VISIT (OUTPATIENT)
Dept: URGENT CARE | Facility: CLINIC | Age: 61
End: 2021-02-08
Payer: MEDICAID

## 2021-02-08 DIAGNOSIS — Z20.822 ENCOUNTER FOR LABORATORY TESTING FOR COVID-19 VIRUS: ICD-10-CM

## 2021-02-08 LAB — SARS-COV-2 RNA RESP QL NAA+PROBE: NOT DETECTED

## 2021-02-08 PROCEDURE — U0003 INFECTIOUS AGENT DETECTION BY NUCLEIC ACID (DNA OR RNA); SEVERE ACUTE RESPIRATORY SYNDROME CORONAVIRUS 2 (SARS-COV-2) (CORONAVIRUS DISEASE [COVID-19]), AMPLIFIED PROBE TECHNIQUE, MAKING USE OF HIGH THROUGHPUT TECHNOLOGIES AS DESCRIBED BY CMS-2020-01-R: HCPCS

## 2021-02-10 ENCOUNTER — LAB VISIT (OUTPATIENT)
Dept: URGENT CARE | Facility: CLINIC | Age: 61
End: 2021-02-10
Payer: MEDICAID

## 2021-02-10 DIAGNOSIS — Z20.822 ENCOUNTER FOR LABORATORY TESTING FOR COVID-19 VIRUS: ICD-10-CM

## 2021-02-10 PROCEDURE — U0003 INFECTIOUS AGENT DETECTION BY NUCLEIC ACID (DNA OR RNA); SEVERE ACUTE RESPIRATORY SYNDROME CORONAVIRUS 2 (SARS-COV-2) (CORONAVIRUS DISEASE [COVID-19]), AMPLIFIED PROBE TECHNIQUE, MAKING USE OF HIGH THROUGHPUT TECHNOLOGIES AS DESCRIBED BY CMS-2020-01-R: HCPCS

## 2021-02-11 LAB — SARS-COV-2 RNA RESP QL NAA+PROBE: NOT DETECTED

## 2021-02-12 ENCOUNTER — LAB VISIT (OUTPATIENT)
Dept: URGENT CARE | Facility: CLINIC | Age: 61
End: 2021-02-12
Payer: MEDICAID

## 2021-02-12 DIAGNOSIS — Z20.822 ENCOUNTER FOR LABORATORY TESTING FOR COVID-19 VIRUS: ICD-10-CM

## 2021-02-12 LAB — SARS-COV-2 RNA RESP QL NAA+PROBE: NOT DETECTED

## 2021-02-12 PROCEDURE — U0003 INFECTIOUS AGENT DETECTION BY NUCLEIC ACID (DNA OR RNA); SEVERE ACUTE RESPIRATORY SYNDROME CORONAVIRUS 2 (SARS-COV-2) (CORONAVIRUS DISEASE [COVID-19]), AMPLIFIED PROBE TECHNIQUE, MAKING USE OF HIGH THROUGHPUT TECHNOLOGIES AS DESCRIBED BY CMS-2020-01-R: HCPCS

## 2021-02-15 ENCOUNTER — LAB VISIT (OUTPATIENT)
Dept: URGENT CARE | Facility: CLINIC | Age: 61
End: 2021-02-15
Payer: MEDICAID

## 2021-02-15 DIAGNOSIS — Z20.822 ENCOUNTER FOR LABORATORY TESTING FOR COVID-19 VIRUS: ICD-10-CM

## 2021-02-15 PROCEDURE — U0003 INFECTIOUS AGENT DETECTION BY NUCLEIC ACID (DNA OR RNA); SEVERE ACUTE RESPIRATORY SYNDROME CORONAVIRUS 2 (SARS-COV-2) (CORONAVIRUS DISEASE [COVID-19]), AMPLIFIED PROBE TECHNIQUE, MAKING USE OF HIGH THROUGHPUT TECHNOLOGIES AS DESCRIBED BY CMS-2020-01-R: HCPCS

## 2021-02-16 LAB — SARS-COV-2 RNA RESP QL NAA+PROBE: NOT DETECTED

## 2021-02-17 ENCOUNTER — LAB VISIT (OUTPATIENT)
Dept: URGENT CARE | Facility: CLINIC | Age: 61
End: 2021-02-17
Payer: MEDICAID

## 2021-02-17 DIAGNOSIS — Z20.822 ENCOUNTER FOR LABORATORY TESTING FOR COVID-19 VIRUS: ICD-10-CM

## 2021-02-17 LAB — SARS-COV-2 RNA RESP QL NAA+PROBE: NOT DETECTED

## 2021-02-17 PROCEDURE — U0003 INFECTIOUS AGENT DETECTION BY NUCLEIC ACID (DNA OR RNA); SEVERE ACUTE RESPIRATORY SYNDROME CORONAVIRUS 2 (SARS-COV-2) (CORONAVIRUS DISEASE [COVID-19]), AMPLIFIED PROBE TECHNIQUE, MAKING USE OF HIGH THROUGHPUT TECHNOLOGIES AS DESCRIBED BY CMS-2020-01-R: HCPCS

## 2021-02-19 ENCOUNTER — LAB VISIT (OUTPATIENT)
Dept: URGENT CARE | Facility: CLINIC | Age: 61
End: 2021-02-19
Payer: MEDICAID

## 2021-02-19 DIAGNOSIS — Z20.822 ENCOUNTER FOR LABORATORY TESTING FOR COVID-19 VIRUS: ICD-10-CM

## 2021-02-19 PROCEDURE — U0003 INFECTIOUS AGENT DETECTION BY NUCLEIC ACID (DNA OR RNA); SEVERE ACUTE RESPIRATORY SYNDROME CORONAVIRUS 2 (SARS-COV-2) (CORONAVIRUS DISEASE [COVID-19]), AMPLIFIED PROBE TECHNIQUE, MAKING USE OF HIGH THROUGHPUT TECHNOLOGIES AS DESCRIBED BY CMS-2020-01-R: HCPCS

## 2021-02-20 LAB — SARS-COV-2 RNA RESP QL NAA+PROBE: NOT DETECTED

## 2021-02-22 ENCOUNTER — LAB VISIT (OUTPATIENT)
Dept: URGENT CARE | Facility: CLINIC | Age: 61
End: 2021-02-22
Payer: MEDICAID

## 2021-02-22 DIAGNOSIS — Z20.822 ENCOUNTER FOR LABORATORY TESTING FOR COVID-19 VIRUS: ICD-10-CM

## 2021-02-22 LAB — SARS-COV-2 RNA RESP QL NAA+PROBE: NOT DETECTED

## 2021-02-22 PROCEDURE — U0003 INFECTIOUS AGENT DETECTION BY NUCLEIC ACID (DNA OR RNA); SEVERE ACUTE RESPIRATORY SYNDROME CORONAVIRUS 2 (SARS-COV-2) (CORONAVIRUS DISEASE [COVID-19]), AMPLIFIED PROBE TECHNIQUE, MAKING USE OF HIGH THROUGHPUT TECHNOLOGIES AS DESCRIBED BY CMS-2020-01-R: HCPCS

## 2021-02-24 ENCOUNTER — LAB VISIT (OUTPATIENT)
Dept: URGENT CARE | Facility: CLINIC | Age: 61
End: 2021-02-24
Payer: MEDICAID

## 2021-02-24 DIAGNOSIS — Z20.822 ENCOUNTER FOR LABORATORY TESTING FOR COVID-19 VIRUS: ICD-10-CM

## 2021-02-24 LAB — SARS-COV-2 RNA RESP QL NAA+PROBE: NOT DETECTED

## 2021-02-24 PROCEDURE — U0003 INFECTIOUS AGENT DETECTION BY NUCLEIC ACID (DNA OR RNA); SEVERE ACUTE RESPIRATORY SYNDROME CORONAVIRUS 2 (SARS-COV-2) (CORONAVIRUS DISEASE [COVID-19]), AMPLIFIED PROBE TECHNIQUE, MAKING USE OF HIGH THROUGHPUT TECHNOLOGIES AS DESCRIBED BY CMS-2020-01-R: HCPCS

## 2021-02-26 ENCOUNTER — LAB VISIT (OUTPATIENT)
Dept: URGENT CARE | Facility: CLINIC | Age: 61
End: 2021-02-26
Payer: MEDICAID

## 2021-02-26 DIAGNOSIS — Z20.822 ENCOUNTER FOR LABORATORY TESTING FOR COVID-19 VIRUS: ICD-10-CM

## 2021-02-26 LAB — SARS-COV-2 RNA RESP QL NAA+PROBE: NOT DETECTED

## 2021-02-26 PROCEDURE — U0003 INFECTIOUS AGENT DETECTION BY NUCLEIC ACID (DNA OR RNA); SEVERE ACUTE RESPIRATORY SYNDROME CORONAVIRUS 2 (SARS-COV-2) (CORONAVIRUS DISEASE [COVID-19]), AMPLIFIED PROBE TECHNIQUE, MAKING USE OF HIGH THROUGHPUT TECHNOLOGIES AS DESCRIBED BY CMS-2020-01-R: HCPCS

## 2021-03-01 ENCOUNTER — LAB VISIT (OUTPATIENT)
Dept: URGENT CARE | Facility: CLINIC | Age: 61
End: 2021-03-01
Payer: MEDICAID

## 2021-03-01 DIAGNOSIS — Z20.822 ENCOUNTER FOR LABORATORY TESTING FOR COVID-19 VIRUS: ICD-10-CM

## 2021-03-01 LAB — SARS-COV-2 RNA RESP QL NAA+PROBE: NOT DETECTED

## 2021-03-01 PROCEDURE — U0003 INFECTIOUS AGENT DETECTION BY NUCLEIC ACID (DNA OR RNA); SEVERE ACUTE RESPIRATORY SYNDROME CORONAVIRUS 2 (SARS-COV-2) (CORONAVIRUS DISEASE [COVID-19]), AMPLIFIED PROBE TECHNIQUE, MAKING USE OF HIGH THROUGHPUT TECHNOLOGIES AS DESCRIBED BY CMS-2020-01-R: HCPCS

## 2021-03-03 ENCOUNTER — LAB VISIT (OUTPATIENT)
Dept: URGENT CARE | Facility: CLINIC | Age: 61
End: 2021-03-03
Payer: MEDICAID

## 2021-03-03 DIAGNOSIS — Z20.822 ENCOUNTER FOR LABORATORY TESTING FOR COVID-19 VIRUS: ICD-10-CM

## 2021-03-03 LAB — SARS-COV-2 RNA RESP QL NAA+PROBE: NOT DETECTED

## 2021-03-03 PROCEDURE — U0003 INFECTIOUS AGENT DETECTION BY NUCLEIC ACID (DNA OR RNA); SEVERE ACUTE RESPIRATORY SYNDROME CORONAVIRUS 2 (SARS-COV-2) (CORONAVIRUS DISEASE [COVID-19]), AMPLIFIED PROBE TECHNIQUE, MAKING USE OF HIGH THROUGHPUT TECHNOLOGIES AS DESCRIBED BY CMS-2020-01-R: HCPCS | Performed by: EMERGENCY MEDICINE

## 2021-03-05 ENCOUNTER — LAB VISIT (OUTPATIENT)
Dept: URGENT CARE | Facility: CLINIC | Age: 61
End: 2021-03-05
Payer: MEDICAID

## 2021-03-05 ENCOUNTER — IMMUNIZATION (OUTPATIENT)
Dept: PRIMARY CARE CLINIC | Facility: CLINIC | Age: 61
End: 2021-03-05
Payer: MEDICAID

## 2021-03-05 DIAGNOSIS — Z20.822 ENCOUNTER FOR LABORATORY TESTING FOR COVID-19 VIRUS: ICD-10-CM

## 2021-03-05 DIAGNOSIS — Z23 NEED FOR VACCINATION: Primary | ICD-10-CM

## 2021-03-05 PROCEDURE — 0031A PR IMMUNIZ ADMIN, SARS-COV-2 COVID-19 VACC, 5X10VP/0.5ML: ICD-10-PCS | Mod: CV19,S$GLB,, | Performed by: INTERNAL MEDICINE

## 2021-03-05 PROCEDURE — 91303 PR SARSCOV2 VAC AD26 .5ML IM: CPT | Mod: S$GLB,,, | Performed by: INTERNAL MEDICINE

## 2021-03-05 PROCEDURE — 91303 PR SARSCOV2 VAC AD26 .5ML IM: ICD-10-PCS | Mod: S$GLB,,, | Performed by: INTERNAL MEDICINE

## 2021-03-05 PROCEDURE — U0003 INFECTIOUS AGENT DETECTION BY NUCLEIC ACID (DNA OR RNA); SEVERE ACUTE RESPIRATORY SYNDROME CORONAVIRUS 2 (SARS-COV-2) (CORONAVIRUS DISEASE [COVID-19]), AMPLIFIED PROBE TECHNIQUE, MAKING USE OF HIGH THROUGHPUT TECHNOLOGIES AS DESCRIBED BY CMS-2020-01-R: HCPCS | Performed by: EMERGENCY MEDICINE

## 2021-03-05 PROCEDURE — 0031A PR IMMUNIZ ADMIN, SARS-COV-2 COVID-19 VACC, 5X10VP/0.5ML: CPT | Mod: CV19,S$GLB,, | Performed by: INTERNAL MEDICINE

## 2021-03-06 LAB — SARS-COV-2 RNA RESP QL NAA+PROBE: NOT DETECTED

## 2021-03-08 ENCOUNTER — LAB VISIT (OUTPATIENT)
Dept: URGENT CARE | Facility: CLINIC | Age: 61
End: 2021-03-08
Payer: MEDICAID

## 2021-03-08 DIAGNOSIS — Z20.822 ENCOUNTER FOR LABORATORY TESTING FOR COVID-19 VIRUS: ICD-10-CM

## 2021-03-08 LAB — SARS-COV-2 RNA RESP QL NAA+PROBE: NOT DETECTED

## 2021-03-08 PROCEDURE — U0003 INFECTIOUS AGENT DETECTION BY NUCLEIC ACID (DNA OR RNA); SEVERE ACUTE RESPIRATORY SYNDROME CORONAVIRUS 2 (SARS-COV-2) (CORONAVIRUS DISEASE [COVID-19]), AMPLIFIED PROBE TECHNIQUE, MAKING USE OF HIGH THROUGHPUT TECHNOLOGIES AS DESCRIBED BY CMS-2020-01-R: HCPCS | Performed by: EMERGENCY MEDICINE

## 2021-03-10 ENCOUNTER — LAB VISIT (OUTPATIENT)
Dept: URGENT CARE | Facility: CLINIC | Age: 61
End: 2021-03-10
Payer: MEDICAID

## 2021-03-10 DIAGNOSIS — Z20.822 ENCOUNTER FOR LABORATORY TESTING FOR COVID-19 VIRUS: ICD-10-CM

## 2021-03-10 LAB — SARS-COV-2 RNA RESP QL NAA+PROBE: NOT DETECTED

## 2021-03-10 PROCEDURE — U0003 INFECTIOUS AGENT DETECTION BY NUCLEIC ACID (DNA OR RNA); SEVERE ACUTE RESPIRATORY SYNDROME CORONAVIRUS 2 (SARS-COV-2) (CORONAVIRUS DISEASE [COVID-19]), AMPLIFIED PROBE TECHNIQUE, MAKING USE OF HIGH THROUGHPUT TECHNOLOGIES AS DESCRIBED BY CMS-2020-01-R: HCPCS | Performed by: EMERGENCY MEDICINE

## 2021-03-12 ENCOUNTER — LAB VISIT (OUTPATIENT)
Dept: URGENT CARE | Facility: CLINIC | Age: 61
End: 2021-03-12
Payer: MEDICAID

## 2021-03-12 DIAGNOSIS — Z20.822 ENCOUNTER FOR LABORATORY TESTING FOR COVID-19 VIRUS: ICD-10-CM

## 2021-03-12 PROCEDURE — U0003 INFECTIOUS AGENT DETECTION BY NUCLEIC ACID (DNA OR RNA); SEVERE ACUTE RESPIRATORY SYNDROME CORONAVIRUS 2 (SARS-COV-2) (CORONAVIRUS DISEASE [COVID-19]), AMPLIFIED PROBE TECHNIQUE, MAKING USE OF HIGH THROUGHPUT TECHNOLOGIES AS DESCRIBED BY CMS-2020-01-R: HCPCS | Performed by: EMERGENCY MEDICINE

## 2021-03-13 LAB — SARS-COV-2 RNA RESP QL NAA+PROBE: NOT DETECTED

## 2021-03-15 ENCOUNTER — LAB VISIT (OUTPATIENT)
Dept: URGENT CARE | Facility: CLINIC | Age: 61
End: 2021-03-15
Payer: MEDICAID

## 2021-03-15 DIAGNOSIS — Z20.822 ENCOUNTER FOR LABORATORY TESTING FOR COVID-19 VIRUS: ICD-10-CM

## 2021-03-15 PROCEDURE — U0003 INFECTIOUS AGENT DETECTION BY NUCLEIC ACID (DNA OR RNA); SEVERE ACUTE RESPIRATORY SYNDROME CORONAVIRUS 2 (SARS-COV-2) (CORONAVIRUS DISEASE [COVID-19]), AMPLIFIED PROBE TECHNIQUE, MAKING USE OF HIGH THROUGHPUT TECHNOLOGIES AS DESCRIBED BY CMS-2020-01-R: HCPCS | Performed by: EMERGENCY MEDICINE

## 2021-03-16 LAB — SARS-COV-2 RNA RESP QL NAA+PROBE: NOT DETECTED

## 2021-03-17 ENCOUNTER — LAB VISIT (OUTPATIENT)
Dept: URGENT CARE | Facility: CLINIC | Age: 61
End: 2021-03-17
Payer: MEDICAID

## 2021-03-17 DIAGNOSIS — Z20.822 ENCOUNTER FOR LABORATORY TESTING FOR COVID-19 VIRUS: ICD-10-CM

## 2021-03-17 PROCEDURE — U0003 INFECTIOUS AGENT DETECTION BY NUCLEIC ACID (DNA OR RNA); SEVERE ACUTE RESPIRATORY SYNDROME CORONAVIRUS 2 (SARS-COV-2) (CORONAVIRUS DISEASE [COVID-19]), AMPLIFIED PROBE TECHNIQUE, MAKING USE OF HIGH THROUGHPUT TECHNOLOGIES AS DESCRIBED BY CMS-2020-01-R: HCPCS | Performed by: EMERGENCY MEDICINE

## 2021-03-18 LAB — SARS-COV-2 RNA RESP QL NAA+PROBE: NOT DETECTED

## 2021-03-19 ENCOUNTER — LAB VISIT (OUTPATIENT)
Dept: URGENT CARE | Facility: CLINIC | Age: 61
End: 2021-03-19
Payer: MEDICAID

## 2021-03-19 DIAGNOSIS — Z20.822 ENCOUNTER FOR LABORATORY TESTING FOR COVID-19 VIRUS: ICD-10-CM

## 2021-03-19 PROCEDURE — U0003 INFECTIOUS AGENT DETECTION BY NUCLEIC ACID (DNA OR RNA); SEVERE ACUTE RESPIRATORY SYNDROME CORONAVIRUS 2 (SARS-COV-2) (CORONAVIRUS DISEASE [COVID-19]), AMPLIFIED PROBE TECHNIQUE, MAKING USE OF HIGH THROUGHPUT TECHNOLOGIES AS DESCRIBED BY CMS-2020-01-R: HCPCS | Performed by: EMERGENCY MEDICINE

## 2021-03-20 LAB — SARS-COV-2 RNA RESP QL NAA+PROBE: NOT DETECTED

## 2021-03-22 ENCOUNTER — LAB VISIT (OUTPATIENT)
Dept: URGENT CARE | Facility: CLINIC | Age: 61
End: 2021-03-22
Payer: MEDICAID

## 2021-03-22 DIAGNOSIS — Z20.822 ENCOUNTER FOR LABORATORY TESTING FOR COVID-19 VIRUS: ICD-10-CM

## 2021-03-22 LAB — SARS-COV-2 RNA RESP QL NAA+PROBE: NOT DETECTED

## 2021-03-22 PROCEDURE — U0003 INFECTIOUS AGENT DETECTION BY NUCLEIC ACID (DNA OR RNA); SEVERE ACUTE RESPIRATORY SYNDROME CORONAVIRUS 2 (SARS-COV-2) (CORONAVIRUS DISEASE [COVID-19]), AMPLIFIED PROBE TECHNIQUE, MAKING USE OF HIGH THROUGHPUT TECHNOLOGIES AS DESCRIBED BY CMS-2020-01-R: HCPCS | Performed by: EMERGENCY MEDICINE

## 2021-03-24 ENCOUNTER — LAB VISIT (OUTPATIENT)
Dept: URGENT CARE | Facility: CLINIC | Age: 61
End: 2021-03-24
Payer: MEDICAID

## 2021-03-24 DIAGNOSIS — Z20.822 ENCOUNTER FOR LABORATORY TESTING FOR COVID-19 VIRUS: ICD-10-CM

## 2021-03-24 PROCEDURE — U0003 INFECTIOUS AGENT DETECTION BY NUCLEIC ACID (DNA OR RNA); SEVERE ACUTE RESPIRATORY SYNDROME CORONAVIRUS 2 (SARS-COV-2) (CORONAVIRUS DISEASE [COVID-19]), AMPLIFIED PROBE TECHNIQUE, MAKING USE OF HIGH THROUGHPUT TECHNOLOGIES AS DESCRIBED BY CMS-2020-01-R: HCPCS | Performed by: EMERGENCY MEDICINE

## 2021-03-25 LAB — SARS-COV-2 RNA RESP QL NAA+PROBE: NOT DETECTED

## 2021-03-26 ENCOUNTER — LAB VISIT (OUTPATIENT)
Dept: URGENT CARE | Facility: CLINIC | Age: 61
End: 2021-03-26
Payer: MEDICAID

## 2021-03-26 DIAGNOSIS — Z20.822 ENCOUNTER FOR LABORATORY TESTING FOR COVID-19 VIRUS: ICD-10-CM

## 2021-03-26 LAB — SARS-COV-2 RNA RESP QL NAA+PROBE: NOT DETECTED

## 2021-03-26 PROCEDURE — U0003 INFECTIOUS AGENT DETECTION BY NUCLEIC ACID (DNA OR RNA); SEVERE ACUTE RESPIRATORY SYNDROME CORONAVIRUS 2 (SARS-COV-2) (CORONAVIRUS DISEASE [COVID-19]), AMPLIFIED PROBE TECHNIQUE, MAKING USE OF HIGH THROUGHPUT TECHNOLOGIES AS DESCRIBED BY CMS-2020-01-R: HCPCS | Performed by: EMERGENCY MEDICINE

## 2021-03-29 ENCOUNTER — LAB VISIT (OUTPATIENT)
Dept: URGENT CARE | Facility: CLINIC | Age: 61
End: 2021-03-29
Payer: MEDICAID

## 2021-03-29 DIAGNOSIS — Z20.822 ENCOUNTER FOR LABORATORY TESTING FOR COVID-19 VIRUS: ICD-10-CM

## 2021-03-29 PROCEDURE — U0003 INFECTIOUS AGENT DETECTION BY NUCLEIC ACID (DNA OR RNA); SEVERE ACUTE RESPIRATORY SYNDROME CORONAVIRUS 2 (SARS-COV-2) (CORONAVIRUS DISEASE [COVID-19]), AMPLIFIED PROBE TECHNIQUE, MAKING USE OF HIGH THROUGHPUT TECHNOLOGIES AS DESCRIBED BY CMS-2020-01-R: HCPCS | Performed by: EMERGENCY MEDICINE

## 2021-03-30 LAB — SARS-COV-2 RNA RESP QL NAA+PROBE: NOT DETECTED

## 2021-03-31 ENCOUNTER — LAB VISIT (OUTPATIENT)
Dept: URGENT CARE | Facility: CLINIC | Age: 61
End: 2021-03-31
Payer: MEDICAID

## 2021-03-31 DIAGNOSIS — Z20.822 ENCOUNTER FOR LABORATORY TESTING FOR COVID-19 VIRUS: ICD-10-CM

## 2021-03-31 PROCEDURE — U0003 INFECTIOUS AGENT DETECTION BY NUCLEIC ACID (DNA OR RNA); SEVERE ACUTE RESPIRATORY SYNDROME CORONAVIRUS 2 (SARS-COV-2) (CORONAVIRUS DISEASE [COVID-19]), AMPLIFIED PROBE TECHNIQUE, MAKING USE OF HIGH THROUGHPUT TECHNOLOGIES AS DESCRIBED BY CMS-2020-01-R: HCPCS | Performed by: EMERGENCY MEDICINE

## 2021-04-01 LAB — SARS-COV-2 RNA RESP QL NAA+PROBE: NOT DETECTED

## 2021-11-21 ENCOUNTER — IMMUNIZATION (OUTPATIENT)
Dept: PRIMARY CARE CLINIC | Facility: CLINIC | Age: 61
End: 2021-11-21
Payer: MEDICAID

## 2021-11-21 DIAGNOSIS — Z23 NEED FOR VACCINATION: Primary | ICD-10-CM

## 2021-11-21 PROCEDURE — 91300 COVID-19, MRNA, LNP-S, PF, 30 MCG/0.3 ML DOSE VACCINE: CPT | Mod: PBBFAC | Performed by: INTERNAL MEDICINE

## 2021-11-21 PROCEDURE — 0003A COVID-19, MRNA, LNP-S, PF, 30 MCG/0.3 ML DOSE VACCINE: CPT | Mod: CV19,PBBFAC | Performed by: INTERNAL MEDICINE

## 2021-12-12 ENCOUNTER — OFFICE VISIT (OUTPATIENT)
Dept: URGENT CARE | Facility: CLINIC | Age: 61
End: 2021-12-12
Payer: MEDICAID

## 2021-12-12 VITALS
HEIGHT: 70 IN | WEIGHT: 170 LBS | OXYGEN SATURATION: 91 % | BODY MASS INDEX: 24.34 KG/M2 | RESPIRATION RATE: 20 BRPM | DIASTOLIC BLOOD PRESSURE: 76 MMHG | HEART RATE: 99 BPM | SYSTOLIC BLOOD PRESSURE: 116 MMHG | TEMPERATURE: 98 F

## 2021-12-12 DIAGNOSIS — R06.02 SOB (SHORTNESS OF BREATH): ICD-10-CM

## 2021-12-12 DIAGNOSIS — R50.9 FEVER, UNSPECIFIED FEVER CAUSE: ICD-10-CM

## 2021-12-12 DIAGNOSIS — R05.9 COUGH: ICD-10-CM

## 2021-12-12 DIAGNOSIS — J18.9 PNEUMONIA OF BOTH LUNGS DUE TO INFECTIOUS ORGANISM, UNSPECIFIED PART OF LUNG: Primary | ICD-10-CM

## 2021-12-12 LAB
CTP QC/QA: YES
CTP QC/QA: YES
POC MOLECULAR INFLUENZA A AGN: NEGATIVE
POC MOLECULAR INFLUENZA B AGN: NEGATIVE
SARS-COV-2 RDRP RESP QL NAA+PROBE: NEGATIVE

## 2021-12-12 PROCEDURE — 87502 INFLUENZA DNA AMP PROBE: CPT | Mod: QW,S$GLB,, | Performed by: NURSE PRACTITIONER

## 2021-12-12 PROCEDURE — 87502 POCT INFLUENZA A/B MOLECULAR: ICD-10-PCS | Mod: QW,S$GLB,, | Performed by: NURSE PRACTITIONER

## 2021-12-12 PROCEDURE — U0002 COVID-19 LAB TEST NON-CDC: HCPCS | Mod: QW,S$GLB,, | Performed by: NURSE PRACTITIONER

## 2021-12-12 PROCEDURE — 71046 X-RAY EXAM CHEST 2 VIEWS: CPT | Mod: FY,S$GLB,, | Performed by: RADIOLOGY

## 2021-12-12 PROCEDURE — 99213 OFFICE O/P EST LOW 20 MIN: CPT | Mod: S$GLB,,, | Performed by: NURSE PRACTITIONER

## 2021-12-12 PROCEDURE — U0002: ICD-10-PCS | Mod: QW,S$GLB,, | Performed by: NURSE PRACTITIONER

## 2021-12-12 PROCEDURE — 99213 PR OFFICE/OUTPT VISIT, EST, LEVL III, 20-29 MIN: ICD-10-PCS | Mod: S$GLB,,, | Performed by: NURSE PRACTITIONER

## 2021-12-12 PROCEDURE — 71046 XR CHEST PA AND LATERAL: ICD-10-PCS | Mod: FY,S$GLB,, | Performed by: RADIOLOGY

## 2022-02-27 ENCOUNTER — OFFICE VISIT (OUTPATIENT)
Dept: URGENT CARE | Facility: CLINIC | Age: 62
End: 2022-02-27
Payer: MEDICAID

## 2022-02-27 VITALS
SYSTOLIC BLOOD PRESSURE: 151 MMHG | TEMPERATURE: 99 F | HEART RATE: 113 BPM | OXYGEN SATURATION: 97 % | RESPIRATION RATE: 20 BRPM | DIASTOLIC BLOOD PRESSURE: 82 MMHG | WEIGHT: 165 LBS | HEIGHT: 70 IN | BODY MASS INDEX: 23.62 KG/M2

## 2022-02-27 DIAGNOSIS — L03.90 CELLULITIS, UNSPECIFIED CELLULITIS SITE: ICD-10-CM

## 2022-02-27 DIAGNOSIS — M10.9 ACUTE GOUT OF RIGHT KNEE, UNSPECIFIED CAUSE: Primary | ICD-10-CM

## 2022-02-27 PROCEDURE — 3077F PR MOST RECENT SYSTOLIC BLOOD PRESSURE >= 140 MM HG: ICD-10-PCS | Mod: CPTII,S$GLB,, | Performed by: PHYSICIAN ASSISTANT

## 2022-02-27 PROCEDURE — 1160F RVW MEDS BY RX/DR IN RCRD: CPT | Mod: CPTII,S$GLB,, | Performed by: PHYSICIAN ASSISTANT

## 2022-02-27 PROCEDURE — 3008F PR BODY MASS INDEX (BMI) DOCUMENTED: ICD-10-PCS | Mod: CPTII,S$GLB,, | Performed by: PHYSICIAN ASSISTANT

## 2022-02-27 PROCEDURE — 3077F SYST BP >= 140 MM HG: CPT | Mod: CPTII,S$GLB,, | Performed by: PHYSICIAN ASSISTANT

## 2022-02-27 PROCEDURE — 1160F PR REVIEW ALL MEDS BY PRESCRIBER/CLIN PHARMACIST DOCUMENTED: ICD-10-PCS | Mod: CPTII,S$GLB,, | Performed by: PHYSICIAN ASSISTANT

## 2022-02-27 PROCEDURE — 99214 PR OFFICE/OUTPT VISIT, EST, LEVL IV, 30-39 MIN: ICD-10-PCS | Mod: 25,S$GLB,, | Performed by: PHYSICIAN ASSISTANT

## 2022-02-27 PROCEDURE — 99214 OFFICE O/P EST MOD 30 MIN: CPT | Mod: 25,S$GLB,, | Performed by: PHYSICIAN ASSISTANT

## 2022-02-27 PROCEDURE — 3079F PR MOST RECENT DIASTOLIC BLOOD PRESSURE 80-89 MM HG: ICD-10-PCS | Mod: CPTII,S$GLB,, | Performed by: PHYSICIAN ASSISTANT

## 2022-02-27 PROCEDURE — 1159F MED LIST DOCD IN RCRD: CPT | Mod: CPTII,S$GLB,, | Performed by: PHYSICIAN ASSISTANT

## 2022-02-27 PROCEDURE — 3008F BODY MASS INDEX DOCD: CPT | Mod: CPTII,S$GLB,, | Performed by: PHYSICIAN ASSISTANT

## 2022-02-27 PROCEDURE — 3079F DIAST BP 80-89 MM HG: CPT | Mod: CPTII,S$GLB,, | Performed by: PHYSICIAN ASSISTANT

## 2022-02-27 PROCEDURE — 1159F PR MEDICATION LIST DOCUMENTED IN MEDICAL RECORD: ICD-10-PCS | Mod: CPTII,S$GLB,, | Performed by: PHYSICIAN ASSISTANT

## 2022-02-27 RX ORDER — AMOXICILLIN AND CLAVULANATE POTASSIUM 875; 125 MG/1; MG/1
1 TABLET, FILM COATED ORAL EVERY 12 HOURS
Qty: 20 TABLET | Refills: 0 | Status: SHIPPED | OUTPATIENT
Start: 2022-02-27 | End: 2022-03-09 | Stop reason: ALTCHOICE

## 2022-02-27 RX ORDER — METHYLPREDNISOLONE 4 MG/1
TABLET ORAL
Qty: 21 EACH | Refills: 0 | Status: SHIPPED | OUTPATIENT
Start: 2022-02-27 | End: 2022-03-09 | Stop reason: ALTCHOICE

## 2022-02-27 RX ORDER — DEXAMETHASONE SODIUM PHOSPHATE 100 MG/10ML
10 INJECTION INTRAMUSCULAR; INTRAVENOUS
Status: COMPLETED | OUTPATIENT
Start: 2022-02-27 | End: 2022-02-27

## 2022-02-27 RX ADMIN — DEXAMETHASONE SODIUM PHOSPHATE 10 MG: 100 INJECTION INTRAMUSCULAR; INTRAVENOUS at 02:02

## 2022-02-27 NOTE — PROGRESS NOTES
"Subjective:       Patient ID: Daniel Good is a 61 y.o. male.    Vitals:  height is 5' 10" (1.778 m) and weight is 74.8 kg (165 lb). His oral temperature is 98.6 °F (37 °C). His blood pressure is 151/82 (abnormal) and his pulse is 113 (abnormal). His respiration is 20 and oxygen saturation is 97%.     Chief Complaint: Knee Pain (Right knee pain, no injury)    61-year-old male who presents with 2 day history of right knee swelling and pain.  States this started to swell when he woke up 1 morning, the day before he states he bumped his knee but does not remember it being so bad that it caused swelling.  Now has significant pain and redness with warmth to touch.  Also has significant tenderness when touching, states it hurts when that she surrounded night and when he tries to walk id is exacerbated.  No previous history of gout, no previous history of any cellulitis or injuries to this knee.      Knee Pain   The incident occurred 2 days ago. The incident occurred at home. There was no injury mechanism. The pain is present in the right knee. The quality of the pain is described as aching. The pain is at a severity of 9/10. The pain is severe. The pain has been constant since onset. Associated symptoms include an inability to bear weight. He reports no foreign bodies present. The symptoms are aggravated by movement and weight bearing. Treatments tried: Tylenol. The treatment provided no relief.       Constitution: Negative for chills, sweating, fatigue and fever.   HENT: Negative.    Neck: neck negative.   Cardiovascular: Negative.  Negative for chest pain.   Eyes: Negative.    Respiratory: Negative.  Negative for chest tightness and shortness of breath.    Musculoskeletal: Positive for pain, joint pain, joint swelling, abnormal ROM of joint and pain with walking.   Skin: Negative for erythema.       Objective:      Physical Exam   Constitutional: He is oriented to person, place, and time. He appears " well-developed.  Non-toxic appearance. He does not appear ill. No distress. normal  HENT:   Head: Normocephalic and atraumatic. Head is without abrasion, without contusion and without laceration.   Ears:   Right Ear: External ear normal.   Left Ear: External ear normal.   Nose: Nose normal.   Mouth/Throat: Oropharynx is clear and moist and mucous membranes are normal.   Eyes: Conjunctivae, EOM and lids are normal. Pupils are equal, round, and reactive to light.   Neck: Trachea normal and phonation normal. Neck supple.   Cardiovascular: Normal rate, regular rhythm and normal heart sounds.   Pulmonary/Chest: Effort normal and breath sounds normal. No stridor. No respiratory distress.   Abdominal: Normal appearance.   Musculoskeletal:      Right knee: He exhibits decreased range of motion, swelling and erythema. He exhibits no effusion, no ecchymosis, no deformity and no laceration. Tenderness found.      Left knee: Normal.        Legs:    Neurological: He is alert and oriented to person, place, and time.   Skin: Skin is warm, dry, intact and no rash. Capillary refill takes less than 2 seconds. not right kneeNo abrasion, No burn, No bruising, No erythema and No ecchymosis   Psychiatric: His speech is normal and behavior is normal. Judgment and thought content normal.   Nursing note and vitals reviewed.                Assessment:       1. Acute gout of right knee, unspecified cause    2. Cellulitis, unspecified cellulitis site          Plan:         Acute gout of right knee, unspecified cause  -     dexamethasone injection 10 mg  -     methylPREDNISolone (MEDROL DOSEPACK) 4 mg tablet; use as directed  Dispense: 21 each; Refill: 0    Cellulitis, unspecified cellulitis site  -     amoxicillin-clavulanate 875-125mg (AUGMENTIN) 875-125 mg per tablet; Take 1 tablet by mouth every 12 (twelve) hours. for 10 days  Dispense: 20 tablet; Refill: 0    Patient has appointment with PCP in 5 days, should discuss issue with PCP and  possibly get uric acid lab.  If symptoms do not improve by the time he follows up with his PCP or when ortho referral is initiated patient did follow-up with orthopedics.  Discussed signs and symptoms of infection and need for ER precautions to prevent septic joint.             You must understand that you've received an Urgent Care treatment only and that you may be released before all your medical problems are known or treated. You, the patient, will arrange for follow up care as instructed.  Follow up with your PCP or specialty clinic as directed in the next 1-2 weeks if not improved or as needed.  You can call [phone] to schedule an appointment with the appropriate provider.  If your condition worsens we recommend that you receive another evaluation at the emergency room immediately or contact your primary medical clinics after hours call service to discuss your concerns.  Please return here or go to the Emergency Department for any concerns or worsening of condition.    If you were prescribed a narcotic or controlled medication, do not drive or operate heavy equipment or machinery while taking these medications.    Patient Instructions       Clinical References    Patient Education        Cellulitis and Erysipelas (Skin Infections)   The Basics   Written by the doctors and editors at [facility]   What are cellulitis and erysipelas? -- Cellulitis and erysipelas are both infections of the skin. These infections can cause redness, pain, and swelling. The difference between them is that erysipelas tends to affect the upper layers of skin, and cellulitis tends to affect deep layers of skin and sometimes the fat under the skin.  Cellulitis and erysipelas can happen when germs get into the skin. Normally, different types of germs live on a person's skin. Most of the time, these germs do not cause any problems. But if a person gets a cut or a break in the skin, the germs can get into the skin and cause an  "infection.  Certain conditions can increase a person's chance of getting cellulitis or erysipelas. These include:  · Having a cut (even a tiny one)  · Having another type of skin infection or a long-term skin condition  · Having swelling of the skin or swelling in the body  · Being overweight  What are the symptoms of cellulitis and erysipelas? -- Both types of infection cause very similar symptoms. Either infection can cause the infected area to be painful, red, swollen, or warm. Some people with cellulitis or erysipelas can sometimes also have fever or chills. And sometimes, people with these infections have no symptoms or only some of these symptoms.  Most of the time, cellulitis and erysipelas happen on the legs or arms. But people can get these infections in other places, such as the belly, the face, in the mouth, or around the anus.  Will I need tests? -- Most people do not need any tests. Your doctor or nurse will do an exam and look at your skin.  It's important for a doctor or nurse to do an exam to figure out what kind of infection you have. The right treatment for a skin infection depends on the type of infection it is and which germs are causing it. Your doctor or nurse might need to do tests to figure out the cause of your infection.  If you have cellulitis or erysipelas, it's important to get treated. These infections can spread to the whole body and become serious if not treated.  How are cellulitis and erysipelas treated? -- These infections are treated with antibiotic pills. If your doctor prescribes medicine for you to take at home, it is important to follow the directions exactly. Take all of the pills you are given, even if you feel better before you finish them. If you do not take all the pills, the infection can come back worse.  People who have severe infections might be treated in the hospital and given antibiotics through a thin tube that goes into the vein, called an "IV".  What can I do to " help treat my infection? -- You can:  · Raise your arm or leg (if your infection is on your arm or leg) to reduce swelling - Raise the arm or leg up above the level of your heart 3 or 4 times a day, for 30 minutes each time.  · Keep the infected area clean and dry - You can take a shower or bath, but be sure to pat the area dry with a towel afterward.  Antibiotic ointments or creams do not work to treat cellulitis and erysipelas.  Should I call my doctor or nurse? -- You should call your doctor or nurse if your symptoms do not get better within 3 days of starting treatment. You should also call if the red area gets:  · Bigger  · More swollen  · More painful  Your doctor or nurse might do another exam or tests to see if you need different medicines.  Can skin infections be prevented? -- Sometimes. If you cut your skin, make sure to wash the area well with soap and water. This can help prevent the area from getting infected. If you have a long-term skin condition, ask your doctor or nurse what you can do to help prevent infections.  All topics are updated as new evidence becomes available and our peer review process is complete.  This topic retrieved from [employer] on: Sep 21, 2021.  Topic 70917 Version 10.0  Release: 29.4.2 - C29.263  © 2021 UpToDate, Inc. and/or its affiliates. All rights reserved.  Consumer Information Use and Disclaimer   This information is not specific medical advice and does not replace information you receive from your health care provider. This is only a brief summary of general information. It does NOT include all information about conditions, illnesses, injuries, tests, procedures, treatments, therapies, discharge instructions or life-style choices that may apply to you. You must talk with your health care provider for complete information about your health and treatment options. This information should not be used to decide whether or not to accept your health care provider's advice,  instructions or recommendations. Only your health care provider has the knowledge and training to provide advice that is right for you. The use of this information is governed by the [employer] End User License Agreement, available at https://www.[email or web address]/en/solutions/[place]/about/darin.The use of [employer] content is governed by the [employer] Terms of Use. ©2021 UpToDate, Inc. All rights reserved.  Copyright   © 2021 UpToDate, Inc. and/or its affiliates. All rights reserved.     Patient Education        Gout Discharge Instructions   About this topic   Gout causes pain and swelling in a joint, which is a form of arthritis. Gout can happen in some people who have too much uric acid in their blood. Everyone has some uric acid in their blood. It is produced when the body breaks down certain foods. In some cases, too much uric acid builds up.  Uric acid can form sharp crystals that can sometimes build up in different parts of your body, like your joints. Then you may have pain and swelling. This is called a gout flare or attack. Most of the time a painful joint from gout will get better, especially with treatment, within a few days to a few weeks.     Proper treatment may improve signs and help prevent more gout attacks. Treatments needed are drugs, diet, and lifestyle changes. Surgery may be needed if the gouty part (tophi) is affecting your movement.  What care is needed at home?   · Ask your doctor what you need to do when you go home. Make sure you ask questions if you do not understand what the doctor says.  · Take all your medicines as ordered for treating your gout flare. This may include medicines ordered by your doctor or medicines like ibuprofen or naproxen for swelling and pain. These are nonsteroidal anti-inflammatory drugs (NSAIDS). Some over-the-counter medicines and prescription medicines contain the same drug. Do not take multiple medicines without talking to your doctor first.  · Do not take  aspirin to treat your gout flare.  · Rest your joint. Prop it on pillows, keeping it above the level of your heart. This may help lessen pain and swelling.  · Ice may help with your pain. Place an ice pack or a bag of frozen vegetables wrapped in a towel over the painful part. Never put ice right on the skin. Do not leave the ice on more than 10 to 15 minutes at a time.  · Eat a healthy diet that includes plenty of fruits, vegetables, whole grain, and low-fat dairy products.  · Drink plenty of water. Limit sugary drinks and alcohol as they can make your gout flare worse.  · Talk with your regular doctor about other medicines or lifestyle changes that can help prevent gout flares. Keeping a healthy weight or losing weight in a healthy way may help.  What follow-up care is needed?   · Your doctor may ask you to make visits to the office to check on your progress. Be sure to keep these visits.  · You may need to have blood tests to measure your uric acid levels. The doctor may also order tests to check how well your kidneys are working.  What drugs may be needed?   The doctor may order drugs to:  · Help with pain and swelling  · Lower uric acid levels  · Prevent gout attacks  Your doctor may also change some of the drugs you are taking. Certain drugs may increase your uric acid levels and this makes gout worse.  Will physical activity be limited?   Exercise regularly. This can help reduce pain and improve your body function. Your doctor can help you figure out the best exercise for you.  What changes to diet are needed?   1. Ask for a dietitian to review your food choices.  2. Avoid high-purine foods such as:  ? Red meats like steak and hamburgers  ? Organ meat like kidney, liver, or brains  ? Wild game like rabbit, venison, quail, pheasant, and goose  ? [place], anchovies, sardines, or caviar  ? Seafood and shellfish like shrimp, lobsters, mackerel, sardines, mussels, tuna, trout, codfish, david, herring, or  scallops  ? Beer, wine, and mixed drinks (alcohol)  ? Dried beans and peas  ? Some vegetables, such as asparagus, mushrooms, spinach, and cauliflower  ? Gravy  3. Avoid high-fructose foods such as:  ? Sweetened drinks and juices  ? Foods with added fructose corn syrup like sodas, enriched fruit drinks, cereals, ice creams, and candy  What problems could happen?   · Kidney stones and other kidney problems  · Long-lasting joint problem  · Ongoing very bad pain  What can be done to prevent this health problem?   Tell your doctor if you are taking drugs for heart and kidney problems. Tell your doctor about all the drugs, vitamins, herbs, supplements, and any over-the-counter drugs you are taking. If you need to stop taking these drugs, ask your doctor first.  When do I need to call the doctor?   · You have a fever of 102.2°F (39°C) or higher, or chills with severe joint pain that does not get better with treatment.  · You have a fever of 100.4°F (38°C) or higher or chills.  · You have pain with passing urine.  · Your symptoms are not improving within 2 to 3 days or your pain does not go away completely after a few weeks.  Teach Back: Helping You Understand   The Teach Back Method helps you understand the information we are giving you. After you talk with the staff, tell them in your own words what you learned. This helps to make sure the staff has described each thing clearly. It also helps to explain things that may have been confusing. Before going home, make sure you can do these:  · I can tell you about my condition.  · I can tell you what may help ease my pain.  · I can tell you what changes I need to make with my diet or drugs.  · I can tell you what I will do if I have warm, red, swollen joints or very bad pain.  Where can I learn more?   American Academy of Family Physicians  https://familydoctor.org/condition/gout/   National [place] of Arthritis and Musculoskeletal and Skin  Diseases  http://www.niams.nih.gov/Health_Info/Gout/default.asp   NHS  https://www.nhs.uk/conditions/gout/   Last Reviewed Date   2021-06-16  Consumer Information Use and Disclaimer   This information is not specific medical advice and does not replace information you receive from your health care provider. This is only a brief summary of general information. It does NOT include all information about conditions, illnesses, injuries, tests, procedures, treatments, therapies, discharge instructions or life-style choices that may apply to you. You must talk with your health care provider for complete information about your health and treatment options. This information should not be used to decide whether or not to accept your health care providers advice, instructions or recommendations. Only your health care provider has the knowledge and training to provide advice that is right for you.  Copyright   Copyright © 2021 [employer] and its affiliates and/or licensors. All rights reserved.           ANTNOIO Handy

## 2022-02-27 NOTE — PATIENT INSTRUCTIONS
Clinical References    Patient Education        Cellulitis and Erysipelas (Skin Infections)   The Basics   Written by the doctors and editors at [facility]   What are cellulitis and erysipelas? -- Cellulitis and erysipelas are both infections of the skin. These infections can cause redness, pain, and swelling. The difference between them is that erysipelas tends to affect the upper layers of skin, and cellulitis tends to affect deep layers of skin and sometimes the fat under the skin.  Cellulitis and erysipelas can happen when germs get into the skin. Normally, different types of germs live on a person's skin. Most of the time, these germs do not cause any problems. But if a person gets a cut or a break in the skin, the germs can get into the skin and cause an infection.  Certain conditions can increase a person's chance of getting cellulitis or erysipelas. These include:  Having a cut (even a tiny one)  Having another type of skin infection or a long-term skin condition  Having swelling of the skin or swelling in the body  Being overweight  What are the symptoms of cellulitis and erysipelas? -- Both types of infection cause very similar symptoms. Either infection can cause the infected area to be painful, red, swollen, or warm. Some people with cellulitis or erysipelas can sometimes also have fever or chills. And sometimes, people with these infections have no symptoms or only some of these symptoms.  Most of the time, cellulitis and erysipelas happen on the legs or arms. But people can get these infections in other places, such as the belly, the face, in the mouth, or around the anus.  Will I need tests? -- Most people do not need any tests. Your doctor or nurse will do an exam and look at your skin.  It's important for a doctor or nurse to do an exam to figure out what kind of infection you have. The right treatment for a skin infection depends on the type of infection it is and which germs are causing it. Your  "doctor or nurse might need to do tests to figure out the cause of your infection.  If you have cellulitis or erysipelas, it's important to get treated. These infections can spread to the whole body and become serious if not treated.  How are cellulitis and erysipelas treated? -- These infections are treated with antibiotic pills. If your doctor prescribes medicine for you to take at home, it is important to follow the directions exactly. Take all of the pills you are given, even if you feel better before you finish them. If you do not take all the pills, the infection can come back worse.  People who have severe infections might be treated in the hospital and given antibiotics through a thin tube that goes into the vein, called an "IV".  What can I do to help treat my infection? -- You can:  Raise your arm or leg (if your infection is on your arm or leg) to reduce swelling - Raise the arm or leg up above the level of your heart 3 or 4 times a day, for 30 minutes each time.  Keep the infected area clean and dry - You can take a shower or bath, but be sure to pat the area dry with a towel afterward.  Antibiotic ointments or creams do not work to treat cellulitis and erysipelas.  Should I call my doctor or nurse? -- You should call your doctor or nurse if your symptoms do not get better within 3 days of starting treatment. You should also call if the red area gets:  Bigger  More swollen  More painful  Your doctor or nurse might do another exam or tests to see if you need different medicines.  Can skin infections be prevented? -- Sometimes. If you cut your skin, make sure to wash the area well with soap and water. This can help prevent the area from getting infected. If you have a long-term skin condition, ask your doctor or nurse what you can do to help prevent infections.  All topics are updated as new evidence becomes available and our peer review process is complete.  This topic retrieved from [employer] on: Sep 21, " 2021.  Topic 90349 Version 10.0  Release: 29.4.2 - C29.263  © 2021 UpToDate, Inc. and/or its affiliates. All rights reserved.  Consumer Information Use and Disclaimer   This information is not specific medical advice and does not replace information you receive from your health care provider. This is only a brief summary of general information. It does NOT include all information about conditions, illnesses, injuries, tests, procedures, treatments, therapies, discharge instructions or life-style choices that may apply to you. You must talk with your health care provider for complete information about your health and treatment options. This information should not be used to decide whether or not to accept your health care provider's advice, instructions or recommendations. Only your health care provider has the knowledge and training to provide advice that is right for you. The use of this information is governed by the [employer] End User License Agreement, available at https://www."[employer]/en/solutions/[employer]/about/darin.The use of [employer] content is governed by the [employer] Terms of Use. ©2021 UpToDate, Inc. All rights reserved.  Copyright   © 2021 UpToDate, Inc. and/or its affiliates. All rights reserved.     Patient Education        Gout Discharge Instructions   About this topic   Gout causes pain and swelling in a joint, which is a form of arthritis. Gout can happen in some people who have too much uric acid in their blood. Everyone has some uric acid in their blood. It is produced when the body breaks down certain foods. In some cases, too much uric acid builds up.  Uric acid can form sharp crystals that can sometimes build up in different parts of your body, like your joints. Then you may have pain and swelling. This is called a gout flare or attack. Most of the time a painful joint from gout will get better, especially with treatment, within a few days to a few weeks.     Proper treatment may  improve signs and help prevent more gout attacks. Treatments needed are drugs, diet, and lifestyle changes. Surgery may be needed if the gouty part (tophi) is affecting your movement.  What care is needed at home?   Ask your doctor what you need to do when you go home. Make sure you ask questions if you do not understand what the doctor says.  Take all your medicines as ordered for treating your gout flare. This may include medicines ordered by your doctor or medicines like ibuprofen or naproxen for swelling and pain. These are nonsteroidal anti-inflammatory drugs (NSAIDS). Some over-the-counter medicines and prescription medicines contain the same drug. Do not take multiple medicines without talking to your doctor first.  Do not take aspirin to treat your gout flare.  Rest your joint. Prop it on pillows, keeping it above the level of your heart. This may help lessen pain and swelling.  Ice may help with your pain. Place an ice pack or a bag of frozen vegetables wrapped in a towel over the painful part. Never put ice right on the skin. Do not leave the ice on more than 10 to 15 minutes at a time.  Eat a healthy diet that includes plenty of fruits, vegetables, whole grain, and low-fat dairy products.  Drink plenty of water. Limit sugary drinks and alcohol as they can make your gout flare worse.  Talk with your regular doctor about other medicines or lifestyle changes that can help prevent gout flares. Keeping a healthy weight or losing weight in a healthy way may help.  What follow-up care is needed?   Your doctor may ask you to make visits to the office to check on your progress. Be sure to keep these visits.  You may need to have blood tests to measure your uric acid levels. The doctor may also order tests to check how well your kidneys are working.  What drugs may be needed?   The doctor may order drugs to:  Help with pain and swelling  Lower uric acid levels  Prevent gout attacks  Your doctor may also change some  of the drugs you are taking. Certain drugs may increase your uric acid levels and this makes gout worse.  Will physical activity be limited?   Exercise regularly. This can help reduce pain and improve your body function. Your doctor can help you figure out the best exercise for you.  What changes to diet are needed?   Ask for a dietitian to review your food choices.  Avoid high-purine foods such as:  Red meats like steak and hamburgers  Organ meat like kidney, liver, or brains  Wild game like rabbit, venison, quail, pheasant, and goose  [place], anchovies, sardines, or caviar  Seafood and shellfish like shrimp, lobsters, mackerel, sardines, mussels, tuna, trout, codfish, david, herring, or scallops  Beer, wine, and mixed drinks (alcohol)  Dried beans and peas  Some vegetables, such as asparagus, mushrooms, spinach, and cauliflower  Gravy  Avoid high-fructose foods such as:  Sweetened drinks and juices  Foods with added fructose corn syrup like sodas, enriched fruit drinks, cereals, ice creams, and candy  What problems could happen?   Kidney stones and other kidney problems  Long-lasting joint problem  Ongoing very bad pain  What can be done to prevent this health problem?   Tell your doctor if you are taking drugs for heart and kidney problems. Tell your doctor about all the drugs, vitamins, herbs, supplements, and any over-the-counter drugs you are taking. If you need to stop taking these drugs, ask your doctor first.  When do I need to call the doctor?   You have a fever of 102.2°F (39°C) or higher, or chills with severe joint pain that does not get better with treatment.  You have a fever of 100.4°F (38°C) or higher or chills.  You have pain with passing urine.  Your symptoms are not improving within 2 to 3 days or your pain does not go away completely after a few weeks.  Teach Back: Helping You Understand   The Teach Back Method helps you understand the information we are giving you. After you talk with the  staff, tell them in your own words what you learned. This helps to make sure the staff has described each thing clearly. It also helps to explain things that may have been confusing. Before going home, make sure you can do these:  I can tell you about my condition.  I can tell you what may help ease my pain.  I can tell you what changes I need to make with my diet or drugs.  I can tell you what I will do if I have warm, red, swollen joints or very bad pain.  Where can I learn more?   American Academy of Family Physicians  https://familydoctor.org/condition/gout/   National [place] of Arthritis and Musculoskeletal and Skin Diseases  http://www.niams.nih.gov/Health_Info/Gout/default.asp   NHS  https://www.nhs.uk/conditions/gout/   Last Reviewed Date   2021-06-16  Consumer Information Use and Disclaimer   This information is not specific medical advice and does not replace information you receive from your health care provider. This is only a brief summary of general information. It does NOT include all information about conditions, illnesses, injuries, tests, procedures, treatments, therapies, discharge instructions or life-style choices that may apply to you. You must talk with your health care provider for complete information about your health and treatment options. This information should not be used to decide whether or not to accept your health care providers advice, instructions or recommendations. Only your health care provider has the knowledge and training to provide advice that is right for you.  Copyright   Copyright © 2021 UpToDate, Inc. and its affiliates and/or licensors. All rights reserved.

## 2022-03-09 ENCOUNTER — ANESTHESIA EVENT (OUTPATIENT)
Dept: SURGERY | Facility: HOSPITAL | Age: 62
DRG: 501 | End: 2022-03-09
Payer: MEDICAID

## 2022-03-09 ENCOUNTER — HOSPITAL ENCOUNTER (INPATIENT)
Facility: HOSPITAL | Age: 62
LOS: 2 days | Discharge: LEFT BEFORE DISCHARGE | DRG: 501 | End: 2022-03-11
Attending: EMERGENCY MEDICINE | Admitting: FAMILY MEDICINE
Payer: MEDICAID

## 2022-03-09 ENCOUNTER — ANESTHESIA (OUTPATIENT)
Dept: SURGERY | Facility: HOSPITAL | Age: 62
DRG: 501 | End: 2022-03-09
Payer: MEDICAID

## 2022-03-09 DIAGNOSIS — M00.9 PYOGENIC ARTHRITIS OF RIGHT KNEE JOINT, DUE TO UNSPECIFIED ORGANISM: ICD-10-CM

## 2022-03-09 DIAGNOSIS — L03.115 CELLULITIS OF RIGHT KNEE: Primary | ICD-10-CM

## 2022-03-09 DIAGNOSIS — R52 PAIN: ICD-10-CM

## 2022-03-09 DIAGNOSIS — R60.9 EDEMA: ICD-10-CM

## 2022-03-09 PROBLEM — B19.20 HEPATITIS C: Status: ACTIVE | Noted: 2022-03-09

## 2022-03-09 PROBLEM — D50.9 IRON DEFICIENCY ANEMIA: Status: ACTIVE | Noted: 2022-03-09

## 2022-03-09 PROBLEM — L03.90 CELLULITIS: Status: ACTIVE | Noted: 2022-03-09

## 2022-03-09 PROBLEM — B20 HIV DISEASE: Status: ACTIVE | Noted: 2022-03-09

## 2022-03-09 PROBLEM — I82.409 ACUTE DVT (DEEP VENOUS THROMBOSIS): Status: ACTIVE | Noted: 2022-03-09

## 2022-03-09 LAB
ALBUMIN SERPL BCP-MCNC: 2.6 G/DL (ref 3.5–5.2)
ALP SERPL-CCNC: 98 U/L (ref 55–135)
ALT SERPL W/O P-5'-P-CCNC: 31 U/L (ref 10–44)
ANION GAP SERPL CALC-SCNC: 6 MMOL/L (ref 8–16)
AST SERPL-CCNC: 22 U/L (ref 10–40)
BASOPHILS # BLD AUTO: 0.02 K/UL (ref 0–0.2)
BASOPHILS NFR BLD: 0.4 % (ref 0–1.9)
BILIRUB SERPL-MCNC: 2.7 MG/DL (ref 0.1–1)
BILIRUB UR QL STRIP: NEGATIVE
BUN SERPL-MCNC: 28 MG/DL (ref 6–20)
CALCIUM SERPL-MCNC: 8.1 MG/DL (ref 8.7–10.5)
CHLORIDE SERPL-SCNC: 110 MMOL/L (ref 95–110)
CLARITY UR: CLEAR
CO2 SERPL-SCNC: 21 MMOL/L (ref 23–29)
COLOR UR: YELLOW
CREAT SERPL-MCNC: 0.8 MG/DL (ref 0.5–1.4)
CRP SERPL-MCNC: 55.6 MG/L (ref 0–8.2)
DIFFERENTIAL METHOD: ABNORMAL
EOSINOPHIL # BLD AUTO: 0.2 K/UL (ref 0–0.5)
EOSINOPHIL NFR BLD: 3.7 % (ref 0–8)
ERYTHROCYTE [DISTWIDTH] IN BLOOD BY AUTOMATED COUNT: 16 % (ref 11.5–14.5)
ERYTHROCYTE [SEDIMENTATION RATE] IN BLOOD BY WESTERGREN METHOD: 81 MM/HR (ref 0–10)
EST. GFR  (AFRICAN AMERICAN): >60 ML/MIN/1.73 M^2
EST. GFR  (NON AFRICAN AMERICAN): >60 ML/MIN/1.73 M^2
GLUCOSE SERPL-MCNC: 150 MG/DL (ref 70–110)
GLUCOSE UR QL STRIP: NEGATIVE
HCT VFR BLD AUTO: 29.7 % (ref 40–54)
HGB BLD-MCNC: 9.9 G/DL (ref 14–18)
HGB UR QL STRIP: NEGATIVE
IMM GRANULOCYTES # BLD AUTO: 0.02 K/UL (ref 0–0.04)
IMM GRANULOCYTES NFR BLD AUTO: 0.4 % (ref 0–0.5)
KETONES UR QL STRIP: NEGATIVE
LACTATE SERPL-SCNC: 1.3 MMOL/L (ref 0.5–2.2)
LEUKOCYTE ESTERASE UR QL STRIP: NEGATIVE
LYMPHOCYTES # BLD AUTO: 0.8 K/UL (ref 1–4.8)
LYMPHOCYTES NFR BLD: 14.6 % (ref 18–48)
MCH RBC QN AUTO: 29.8 PG (ref 27–31)
MCHC RBC AUTO-ENTMCNC: 33.3 G/DL (ref 32–36)
MCV RBC AUTO: 90 FL (ref 82–98)
MONOCYTES # BLD AUTO: 0.6 K/UL (ref 0.3–1)
MONOCYTES NFR BLD: 11.9 % (ref 4–15)
NEUTROPHILS # BLD AUTO: 3.6 K/UL (ref 1.8–7.7)
NEUTROPHILS NFR BLD: 69 % (ref 38–73)
NITRITE UR QL STRIP: NEGATIVE
NRBC BLD-RTO: 0 /100 WBC
PH UR STRIP: 6 [PH] (ref 5–8)
PLATELET # BLD AUTO: 126 K/UL (ref 150–450)
PMV BLD AUTO: 9.8 FL (ref 9.2–12.9)
POTASSIUM SERPL-SCNC: 4 MMOL/L (ref 3.5–5.1)
PROCALCITONIN SERPL IA-MCNC: 0.11 NG/ML
PROT SERPL-MCNC: 6.1 G/DL (ref 6–8.4)
PROT UR QL STRIP: NEGATIVE
RBC # BLD AUTO: 3.32 M/UL (ref 4.6–6.2)
SARS-COV-2 RDRP RESP QL NAA+PROBE: NEGATIVE
SODIUM SERPL-SCNC: 137 MMOL/L (ref 136–145)
SP GR UR STRIP: >1.03 (ref 1–1.03)
URN SPEC COLLECT METH UR: ABNORMAL
UROBILINOGEN UR STRIP-ACNC: NEGATIVE EU/DL
WBC # BLD AUTO: 5.14 K/UL (ref 3.9–12.7)

## 2022-03-09 PROCEDURE — 25000003 PHARM REV CODE 250: Performed by: FAMILY MEDICINE

## 2022-03-09 PROCEDURE — 25000003 PHARM REV CODE 250: Performed by: NURSE ANESTHETIST, CERTIFIED REGISTERED

## 2022-03-09 PROCEDURE — 81003 URINALYSIS AUTO W/O SCOPE: CPT | Performed by: EMERGENCY MEDICINE

## 2022-03-09 PROCEDURE — 37000009 HC ANESTHESIA EA ADD 15 MINS: Performed by: ORTHOPAEDIC SURGERY

## 2022-03-09 PROCEDURE — 71000039 HC RECOVERY, EACH ADD'L HOUR: Performed by: ORTHOPAEDIC SURGERY

## 2022-03-09 PROCEDURE — 87077 CULTURE AEROBIC IDENTIFY: CPT | Performed by: FAMILY MEDICINE

## 2022-03-09 PROCEDURE — 63600175 PHARM REV CODE 636 W HCPCS: Performed by: ORTHOPAEDIC SURGERY

## 2022-03-09 PROCEDURE — 85652 RBC SED RATE AUTOMATED: CPT | Performed by: EMERGENCY MEDICINE

## 2022-03-09 PROCEDURE — 96367 TX/PROPH/DG ADDL SEQ IV INF: CPT

## 2022-03-09 PROCEDURE — 87205 SMEAR GRAM STAIN: CPT | Performed by: FAMILY MEDICINE

## 2022-03-09 PROCEDURE — 27340 PR REMOVAL PREPATELLA BURSA: ICD-10-PCS | Mod: RT,,, | Performed by: ORTHOPAEDIC SURGERY

## 2022-03-09 PROCEDURE — 85025 COMPLETE CBC W/AUTO DIFF WBC: CPT | Performed by: EMERGENCY MEDICINE

## 2022-03-09 PROCEDURE — 25000003 PHARM REV CODE 250: Performed by: ANESTHESIOLOGY

## 2022-03-09 PROCEDURE — 36000706: Performed by: ORTHOPAEDIC SURGERY

## 2022-03-09 PROCEDURE — 25000003 PHARM REV CODE 250: Performed by: EMERGENCY MEDICINE

## 2022-03-09 PROCEDURE — 99285 EMERGENCY DEPT VISIT HI MDM: CPT | Mod: 25

## 2022-03-09 PROCEDURE — 80053 COMPREHEN METABOLIC PANEL: CPT | Performed by: EMERGENCY MEDICINE

## 2022-03-09 PROCEDURE — 63600175 PHARM REV CODE 636 W HCPCS: Performed by: EMERGENCY MEDICINE

## 2022-03-09 PROCEDURE — 87070 CULTURE OTHR SPECIMN AEROBIC: CPT | Performed by: FAMILY MEDICINE

## 2022-03-09 PROCEDURE — 11000001 HC ACUTE MED/SURG PRIVATE ROOM

## 2022-03-09 PROCEDURE — 63600175 PHARM REV CODE 636 W HCPCS: Performed by: NURSE ANESTHETIST, CERTIFIED REGISTERED

## 2022-03-09 PROCEDURE — 99900035 HC TECH TIME PER 15 MIN (STAT)

## 2022-03-09 PROCEDURE — 63600175 PHARM REV CODE 636 W HCPCS: Performed by: ANESTHESIOLOGY

## 2022-03-09 PROCEDURE — 27000221 HC OXYGEN, UP TO 24 HOURS

## 2022-03-09 PROCEDURE — 94760 N-INVAS EAR/PLS OXIMETRY 1: CPT

## 2022-03-09 PROCEDURE — 87040 BLOOD CULTURE FOR BACTERIA: CPT | Mod: 59 | Performed by: EMERGENCY MEDICINE

## 2022-03-09 PROCEDURE — 87075 CULTR BACTERIA EXCEPT BLOOD: CPT | Performed by: FAMILY MEDICINE

## 2022-03-09 PROCEDURE — 87186 SC STD MICRODIL/AGAR DIL: CPT | Performed by: FAMILY MEDICINE

## 2022-03-09 PROCEDURE — 96365 THER/PROPH/DIAG IV INF INIT: CPT

## 2022-03-09 PROCEDURE — 84145 PROCALCITONIN (PCT): CPT | Performed by: EMERGENCY MEDICINE

## 2022-03-09 PROCEDURE — U0002 COVID-19 LAB TEST NON-CDC: HCPCS | Performed by: FAMILY MEDICINE

## 2022-03-09 PROCEDURE — 25000003 PHARM REV CODE 250: Performed by: ORTHOPAEDIC SURGERY

## 2022-03-09 PROCEDURE — 36000707: Performed by: ORTHOPAEDIC SURGERY

## 2022-03-09 PROCEDURE — 83605 ASSAY OF LACTIC ACID: CPT | Performed by: EMERGENCY MEDICINE

## 2022-03-09 PROCEDURE — 71000033 HC RECOVERY, INTIAL HOUR: Performed by: ORTHOPAEDIC SURGERY

## 2022-03-09 PROCEDURE — 37000008 HC ANESTHESIA 1ST 15 MINUTES: Performed by: ORTHOPAEDIC SURGERY

## 2022-03-09 PROCEDURE — 27340 REMOVAL OF KNEECAP BURSA: CPT | Mod: RT,,, | Performed by: ORTHOPAEDIC SURGERY

## 2022-03-09 PROCEDURE — 86140 C-REACTIVE PROTEIN: CPT | Performed by: EMERGENCY MEDICINE

## 2022-03-09 RX ORDER — LEVOFLOXACIN 750 MG/1
750 TABLET ORAL DAILY
COMMUNITY
Start: 2021-12-14 | End: 2022-03-09

## 2022-03-09 RX ORDER — HYDROMORPHONE HYDROCHLORIDE 2 MG/ML
0.5 INJECTION, SOLUTION INTRAMUSCULAR; INTRAVENOUS; SUBCUTANEOUS EVERY 5 MIN PRN
Status: DISCONTINUED | OUTPATIENT
Start: 2022-03-09 | End: 2022-03-09 | Stop reason: HOSPADM

## 2022-03-09 RX ORDER — OXYCODONE AND ACETAMINOPHEN 7.5; 325 MG/1; MG/1
1 TABLET ORAL EVERY 4 HOURS PRN
Status: DISCONTINUED | OUTPATIENT
Start: 2022-03-09 | End: 2022-03-11 | Stop reason: HOSPADM

## 2022-03-09 RX ORDER — OXYCODONE HYDROCHLORIDE 5 MG/1
5 TABLET ORAL
Status: DISCONTINUED | OUTPATIENT
Start: 2022-03-09 | End: 2022-03-09 | Stop reason: HOSPADM

## 2022-03-09 RX ORDER — MIDAZOLAM HYDROCHLORIDE 1 MG/ML
INJECTION INTRAMUSCULAR; INTRAVENOUS
Status: DISCONTINUED | OUTPATIENT
Start: 2022-03-09 | End: 2022-03-09

## 2022-03-09 RX ORDER — SODIUM CHLORIDE 0.9 % (FLUSH) 0.9 %
10 SYRINGE (ML) INJECTION EVERY 12 HOURS PRN
Status: DISCONTINUED | OUTPATIENT
Start: 2022-03-09 | End: 2022-03-11 | Stop reason: HOSPADM

## 2022-03-09 RX ORDER — ONDANSETRON 2 MG/ML
4 INJECTION INTRAMUSCULAR; INTRAVENOUS DAILY PRN
Status: DISCONTINUED | OUTPATIENT
Start: 2022-03-09 | End: 2022-03-09 | Stop reason: HOSPADM

## 2022-03-09 RX ORDER — FENTANYL CITRATE 50 UG/ML
INJECTION, SOLUTION INTRAMUSCULAR; INTRAVENOUS
Status: DISCONTINUED | OUTPATIENT
Start: 2022-03-09 | End: 2022-03-09

## 2022-03-09 RX ORDER — LIDOCAINE HCL/PF 100 MG/5ML
SYRINGE (ML) INTRAVENOUS
Status: DISCONTINUED | OUTPATIENT
Start: 2022-03-09 | End: 2022-03-09

## 2022-03-09 RX ORDER — ONDANSETRON 2 MG/ML
INJECTION INTRAMUSCULAR; INTRAVENOUS
Status: DISCONTINUED | OUTPATIENT
Start: 2022-03-09 | End: 2022-03-09

## 2022-03-09 RX ORDER — PROPOFOL 10 MG/ML
INJECTION, EMULSION INTRAVENOUS
Status: DISCONTINUED | OUTPATIENT
Start: 2022-03-09 | End: 2022-03-09

## 2022-03-09 RX ORDER — EPHEDRINE SULFATE 50 MG/ML
INJECTION, SOLUTION INTRAVENOUS
Status: DISCONTINUED | OUTPATIENT
Start: 2022-03-09 | End: 2022-03-09

## 2022-03-09 RX ORDER — VASOPRESSIN 20 [USP'U]/ML
INJECTION, SOLUTION INTRAMUSCULAR; SUBCUTANEOUS
Status: DISCONTINUED | OUTPATIENT
Start: 2022-03-09 | End: 2022-03-09

## 2022-03-09 RX ORDER — SODIUM CHLORIDE, SODIUM LACTATE, POTASSIUM CHLORIDE, CALCIUM CHLORIDE 600; 310; 30; 20 MG/100ML; MG/100ML; MG/100ML; MG/100ML
INJECTION, SOLUTION INTRAVENOUS CONTINUOUS PRN
Status: DISCONTINUED | OUTPATIENT
Start: 2022-03-09 | End: 2022-03-09

## 2022-03-09 RX ORDER — VANCOMYCIN HYDROCHLORIDE 1 G/20ML
INJECTION, POWDER, LYOPHILIZED, FOR SOLUTION INTRAVENOUS
Status: DISCONTINUED | OUTPATIENT
Start: 2022-03-09 | End: 2022-03-09 | Stop reason: HOSPADM

## 2022-03-09 RX ORDER — VANCOMYCIN HCL IN 5 % DEXTROSE 1G/250ML
15 PLASTIC BAG, INJECTION (ML) INTRAVENOUS
Status: DISCONTINUED | OUTPATIENT
Start: 2022-03-09 | End: 2022-03-09

## 2022-03-09 RX ORDER — MULTIVITAMIN
1 TABLET ORAL DAILY
COMMUNITY

## 2022-03-09 RX ORDER — NADOLOL 40 MG/1
40 TABLET ORAL 3 TIMES DAILY
COMMUNITY

## 2022-03-09 RX ADMIN — VANCOMYCIN HYDROCHLORIDE 1750 MG: 500 INJECTION, POWDER, LYOPHILIZED, FOR SOLUTION INTRAVENOUS at 12:03

## 2022-03-09 RX ADMIN — PIPERACILLIN AND TAZOBACTAM 4.5 G: 4; .5 INJECTION, POWDER, LYOPHILIZED, FOR SOLUTION INTRAVENOUS; PARENTERAL at 08:03

## 2022-03-09 RX ADMIN — VASOPRESSIN 2 UNITS: 20 INJECTION, SOLUTION INTRAMUSCULAR; SUBCUTANEOUS at 06:03

## 2022-03-09 RX ADMIN — EPHEDRINE SULFATE 15 MG: 50 INJECTION, SOLUTION INTRAMUSCULAR; INTRAVENOUS; SUBCUTANEOUS at 05:03

## 2022-03-09 RX ADMIN — BICTEGRAVIR SODIUM, EMTRICITABINE, AND TENOFOVIR ALAFENAMIDE FUMARATE 1 TABLET: 50; 200; 25 TABLET ORAL at 02:03

## 2022-03-09 RX ADMIN — PIPERACILLIN AND TAZOBACTAM 4.5 G: 4; .5 INJECTION, POWDER, LYOPHILIZED, FOR SOLUTION INTRAVENOUS; PARENTERAL at 10:03

## 2022-03-09 RX ADMIN — VASOPRESSIN 2 UNITS: 20 INJECTION, SOLUTION INTRAMUSCULAR; SUBCUTANEOUS at 05:03

## 2022-03-09 RX ADMIN — LIDOCAINE HYDROCHLORIDE 100 MG: 20 INJECTION, SOLUTION INTRAVENOUS at 05:03

## 2022-03-09 RX ADMIN — FENTANYL CITRATE 25 MCG: 50 INJECTION, SOLUTION INTRAMUSCULAR; INTRAVENOUS at 05:03

## 2022-03-09 RX ADMIN — HYDROMORPHONE HYDROCHLORIDE 0.5 MG: 2 INJECTION INTRAMUSCULAR; INTRAVENOUS; SUBCUTANEOUS at 06:03

## 2022-03-09 RX ADMIN — ONDANSETRON 8 MG: 2 INJECTION, SOLUTION INTRAMUSCULAR; INTRAVENOUS at 05:03

## 2022-03-09 RX ADMIN — EPHEDRINE SULFATE 10 MG: 50 INJECTION, SOLUTION INTRAMUSCULAR; INTRAVENOUS; SUBCUTANEOUS at 05:03

## 2022-03-09 RX ADMIN — MIDAZOLAM HYDROCHLORIDE 2 MG: 1 INJECTION, SOLUTION INTRAMUSCULAR; INTRAVENOUS at 05:03

## 2022-03-09 RX ADMIN — OXYCODONE 5 MG: 5 TABLET ORAL at 06:03

## 2022-03-09 RX ADMIN — PROPOFOL 200 MG: 10 INJECTION, EMULSION INTRAVENOUS at 05:03

## 2022-03-09 RX ADMIN — SODIUM CHLORIDE, SODIUM LACTATE, POTASSIUM CHLORIDE, AND CALCIUM CHLORIDE: .6; .31; .03; .02 INJECTION, SOLUTION INTRAVENOUS at 05:03

## 2022-03-09 NOTE — CONSULTS
Consult pending  Patient will need surgery later today  No need for MRI  NPO for surgery later tonight

## 2022-03-09 NOTE — ASSESSMENT & PLAN NOTE
Cellulitis   Right knee xray: Prepatellar and infrapatellar soft tissue edema.  Soft tissue ultrasound reports Complex fluid collection in the prepatellar space, in the region of the swelling seen on clinical exam. This could be secondary to cellulitis and septic bursitis versus a hematoma.  MRI pending  Vanc and zosyn  Blood cx  Consult ortho  Consult infection disease  Holding DVT prophylaxis for possible I&D soon.

## 2022-03-09 NOTE — ANESTHESIA PREPROCEDURE EVALUATION
03/09/2022  Daniel Good is a 61 y.o., male. past medical history of DVT (deep venous thrombosis), Hepatitis C, and HIV infection. Plan for debridement R patella bursa.       Pre-op Assessment    I have reviewed the Patient Summary Reports.     I have reviewed the Nursing Notes. I have reviewed the NPO Status.      Review of Systems  Anesthesia Hx:  No problems with previous Anesthesia  History of prior surgery of interest to airway management or planning: Denies Family Hx of Anesthesia complications.   Denies Personal Hx of Anesthesia complications.   Social:  Non-Smoker    Hematology/Oncology:         -- Anemia:   Cardiovascular:   Exercise tolerance: good PVD (DVT 15 years ago)    Pulmonary:  Pulmonary Normal    Renal/:  Renal/ Normal     Hepatic/GI:   Liver Disease, Hepatitis, C    Neurological:  Neurology Normal    Endocrine:  Endocrine Normal        Physical Exam  General: Well nourished    Airway:  Mallampati: I   Mouth Opening: Normal  TM Distance: Normal  Tongue: Normal  Neck ROM: Normal ROM    Dental:  Intact        Anesthesia Plan  Type of Anesthesia, risks & benefits discussed:    Anesthesia Type: Gen ETT, MAC  Intra-op Monitoring Plan: Standard ASA Monitors  Post Op Pain Control Plan: multimodal analgesia  Airway Plan: Direct  ASA Score: 3    Ready For Surgery From Anesthesia Perspective.     .
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
## 2022-03-09 NOTE — PHARMACY MED REC
"Admission Medication History     The home medication history was taken by Jessenia Littlejohn CPhT.    Medication history obtained from,Patient verifiedd    You may go to "Admission" then "Reconcile Home Medications" tabs to review and/or act upon these items.      The home medication list has been updated by the Pharmacy department.    Please read ALL comments highlighted in yellow.    Please address this information as you see fit.     Feel free to contact us if you have any questions or require assistance.      The medications listed below were removed from the home medication list.  Please reorder if appropriate:  Patient reports no longer taking the following medication(s):   augmentin 875-125mg   oscal   Medrol dosepack 4mg   levaquin 750mg      Jessenia Littlejohn CPhT.  Ext [phone]                  .          "

## 2022-03-09 NOTE — ASSESSMENT & PLAN NOTE
Not on medication   RLE Doppler: No evidence of deep venous thrombosis in the right lower extremity.
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
## 2022-03-09 NOTE — Clinical Note
Is this patient a high probability for COVID-19?: No   Diagnosis: Cellulitis of right knee [011949]   Future Attending Provider: STEFANIE OWENS [[id number]]   Admitting Provider:: STEFANIE OWENS [[id number]]   Special Needs:: No Special Needs [1]   Yes
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
## 2022-03-09 NOTE — HPI
Daniel Good is a 62 y/o M with PMH of  DVT (deep venous thrombosis), Hepatitis C, and HIV infection, resent to the emergency room with 2 weeks history of progressive right knee pain that is about 6/10. There is associated swelling, redness, drainage, blister and spread to the lower leg.  Denies fever chills or trauma.  Patient was seen at the urgent care last week and started on antibiotics for infection and steroid for possible gout. LE doppler negative for DVT, right knee xray with Prepatellar and infrapatellar soft tissue edema.

## 2022-03-09 NOTE — PROGRESS NOTES
Pharmacokinetic Initial Assessment: IV Vancomycin    Assessment/Plan:    Initiate intravenous vancomycin with loading dose of 1750 mg once followed by a maintenance dose of vancomycin 1250 mg IV every 12 hours  Desired empiric serum trough concentration is 15 to 20 mcg/mL  Draw vancomycin trough level 60 min prior to fourth dose on 03/10/2022 at approximately 2330  Pharmacy will continue to follow and monitor vancomycin.      Please contact pharmacy at extension [phone] with any questions regarding this assessment.     Thank you for the consult,   Paula Pace       Patient brief summary:  Daniel Good is a 61 y.o. male initiated on antimicrobial therapy with IV Vancomycin for treatment of suspected bone/joint infection    Drug Allergies:   Review of patient's allergies indicates:   Allergen Reactions    Aspirin Hives    Bactrim [sulfamethoxazole-trimethoprim] Hives    Ibuprofen Hives    Naproxen Hives    Sulfa (sulfonamide antibiotics)        Actual Body Weight:   74.8 kg    Renal Function:   Estimated Creatinine Clearance: 102.7 mL/min (based on SCr of 0.8 mg/dL).,     Dialysis Method (if applicable):  N/A    CBC (last 72 hours):  Recent Labs   Lab Result Units 03/09/22  0637   WBC K/uL 5.14   Hemoglobin g/dL 9.9*   Hematocrit % 29.7*   Platelets K/uL 126*   Gran % % 69.0   Lymph % % 14.6*   Mono % % 11.9   Eosinophil % % 3.7   Basophil % % 0.4   Differential Method  Automated       Metabolic Panel (last 72 hours):  Recent Labs   Lab Result Units 03/09/22  0637   Sodium mmol/L 137   Potassium mmol/L 4.0   Chloride mmol/L 110   CO2 mmol/L 21*   Glucose mg/dL 150*   BUN mg/dL 28*   Creatinine mg/dL 0.8   Albumin g/dL 2.6*   Total Bilirubin mg/dL 2.7*   Alkaline Phosphatase U/L 98   AST U/L 22   ALT U/L 31       Drug levels (last 3 results):  No results for input(s): VANCOMYCINRA, VANCOMYCINPE, VANCOMYCINTR in the last 72 hours.    Microbiologic Results:  Microbiology Results (last 7 days)        Procedure Component Value Units Date/Time    Blood culture x two cultures. Draw prior to antibiotics. [[id number]] Collected: 03/09/22 0630    Order Status: Sent Specimen: Blood from Peripheral, Forearm, Right Updated: 03/09/22 1007    Blood culture x two cultures. Draw prior to antibiotics. [[id number]] Collected: 03/09/22 0637    Order Status: Sent Specimen: Blood from Peripheral, Forearm, Left Updated: 03/09/22 1007

## 2022-03-09 NOTE — ED PROVIDER NOTES
Chief Complaint: right knee pain and swelling     History of Present Illness:    Daniel Good 61 y.o. with a  has a past medical history of Deep vein thrombosis, DVT (deep venous thrombosis), Hepatitis C, and HIV infection. who presents to the emergency department today with a complaint of right knee pain, swelling, drainage. Pt reports that this started about 2 weeks ago. Denies injury or trauma. Started slightly red, painful knee, next day went to Urgent Care, was told it could be gout or an infection. Was placed on steroids for gout and ABX for infection. Took the entire course, not improving and getting worse. Increased swelling now not only to knee but down leg to foot. Painful. No fevers, chills or sweats. Has some blistering to the skin and drainage as well.     ROS    Constitutional: No fever, no chills.  Eyes: No discharge. No pain.  HENT: No nasal drainage. No ear ache. No sore throat.  Cardiovascular: No chest pain, no palpitations.  Respiratory: No cough, no shortness of breath.  Gastrointestinal: No abdominal pain, no vomiting. No diarrhea.  Genitourinary: No hematuria, dysuria, urgency.  Musculoskeletal: No back pain.   Skin: No rashes, no lesions.  Neurological: No headache, no focal weakness.    Otherwise remaining ROS negative     The history is provided by the patient      ALLERGIES REVIEWED  MEDICATIONS REVIEWED  PMH/PSH/SOC/FH REVIEWED :  Daniel Good  has a past medical history of Deep vein thrombosis, DVT (deep venous thrombosis), Hepatitis C, and HIV infection. and   has a past surgical history that includes Appendectomy (1994); Hernia repair (2006); and Cosmetic surgery (1991). with  reports that he has never smoked. He has never used smokeless tobacco. He reports that he does not drink alcohol and does not use drugs. and a family history is not on file. He was adopted.      Nursing/Ancillary staff note reviewed.  VS reviewed         Physical Exam     /64   Pulse  "75   Temp 97.9 °F (36.6 °C) (Oral)   Resp (!) 22   Ht 5' 10" (1.778 m)   Wt 74.8 kg (165 lb)   SpO2 98%   BMI 23.68 kg/m²     Physical Exam                Musculoskeletal: + edema, +erythema to the right knee and down the LE, + edema to the right foot, warm to the touch. Decreased ROM on right but can flex knee.   General Appearance: The patient is alert, has no immediate need for airway protection and no signs of toxicity. Afebrile. No acute distress. Lying in bed but able to sit up without difficulty.   HEENT: Eyes: Pupils equal and round no pallor or injection. Extra ocular movements intact. No drainage.       Mouth: Mucous membranes are moist. Oropharynx clear.   Neck: Neck is supple non-tender. No lymphadenopathy. No stridor.   Respiratory: There are no retractions, lungs are clear to auscultation. No wheezing, no crackles. Chest wall nontender to palpation.   Cardiovascular: Regular rate and rhythm. No murmurs, rubs or gallops.  Gastrointestinal:  Abdomen is soft and non-tender, no masses, bowel sounds normal. No guarding, no rebound.  No pulsatile mass.   Neurological: Alert and oriented x 4. CN II-XII grossly intact. No focal weakness. Strength intact 5/5 bilaterally in upper and lower extremities.   Skin: Warm and dry, no rashes.    DIFFERENTIAL DIAGNOSIS: After history and physical exam a differential diagnosis was considered, but was not limited to, cellulitis, joint infection, septic joint         I independently interpreted the lab results and it showed the following: elevated ESR, CRP, WBC normal, anemia, elevated T bili but no RUQ pain.         Reviewed by myself, read by radiology.     US Soft Tissue, Lower Extremity, Right   Final Result      Complex fluid collection in the prepatellar space, in the region of the swelling seen on clinical exam.  This could be secondary to cellulitis and septic bursitis versus a hematoma.      Electronically signed by resident: Hallie Avalos "   Date:    03/09/2022   Time:    09:00      Electronically signed by: Hallie Avalos   Date:    03/09/2022   Time:    09:10      US Lower Extremity Veins Right   Final Result      No evidence of deep venous thrombosis in the right lower extremity.         Electronically signed by: Hallie Avalos   Date:    03/09/2022   Time:    09:00      X-Ray Knee 3 View Right   Final Result      No acute osseous abnormality of the right knee.      Prepatellar and infrapatellar soft tissue edema.         Electronically signed by: Ld Estrada   Date:    03/09/2022   Time:    07:26              ED Course     Medications   piperacillin-tazobactam 4.5 g in dextrose 5 % 100 mL IVPB (ready to mix system) (4.5 g Intravenous New Bag 3/9/22 1045)   vancomycin (VANCOCIN) 1,750 mg in dextrose 5 % 500 mL IVPB (has no administration in time range)         ED Course as of 03/09/22 1048   Wed Mar 09, 2022   0945 I spoke with Dr Hernandez re:the pts presentation, workup, US findings. He will follow along.  [JA]   1000 I spoke with Dr Innocent [facility]ist who accepts for admission.  [JA]      ED Course User Index  [JA] Juli Nix MD              Medical Decision Making:   History:   I obtained history from:  The patient  Old Medical Records: I decided to obtain old medical records.   Reviewed and summarized the old medical record and it showed pt seen in US 2/27. Pics taken at that time - appears to be much worse today.     Initial management:  Daniel Good 61 y.o. male who presents to the ED today for knee pain, swelling. Appears to be worsening despite outpt ABX. The patient was seen and examined, see chart. The history and physical exam was obtained, see chart. The nursing notes and vital signs were reviewed, see chart.          Clinical Tests:   I have ordered and independently interpreted Lab Tests: Ordered and Reviewed  Radiological Study: Ordered and Reviewed        ED Management:  Daniel Good  presents  to the emergency Department today with pain, redness, swelling to his right knee, getting worse despite outpt ABX, he has cellulitis, some effusion as well, Ortho consulted, Hospital medicine to admit.         Voice recognition software utilized in this note.              Impression      The primary encounter diagnosis was Cellulitis of right knee. Diagnoses of Edema and Pain were also pertinent to this visit.                   Juli Nix MD  03/09/22 1048

## 2022-03-09 NOTE — ANESTHESIA PROCEDURE NOTES
Intubation    Date/Time: 3/9/2022 5:28 PM  Performed by: Roseline Burnett CRNA  Authorized by: Allen Julien MD     Intubation:     Induction:  Intravenous    Intubated:  Postinduction    Mask Ventilation:  Easy mask    Attempts:  1    Attempted By:  CRNA    Difficult Airway Encountered?: No      Complications:  None    Airway Device:  Supraglottic airway/LMA    Airway Device Size:  4.0    Style/Cuff Inflation:  Cuffed (inflated to minimal occlusive pressure)    Secured at:  The lips    Placement Verified By:  Capnometry    Complicating Factors:  None    Findings Post-Intubation:  BS equal bilateral and atraumatic/condition of teeth unchanged

## 2022-03-10 LAB
ANION GAP SERPL CALC-SCNC: 6 MMOL/L (ref 8–16)
BASOPHILS # BLD AUTO: 0 K/UL (ref 0–0.2)
BASOPHILS NFR BLD: 0 % (ref 0–1.9)
BUN SERPL-MCNC: 22 MG/DL (ref 6–20)
CALCIUM SERPL-MCNC: 8.2 MG/DL (ref 8.7–10.5)
CHLORIDE SERPL-SCNC: 106 MMOL/L (ref 95–110)
CO2 SERPL-SCNC: 24 MMOL/L (ref 23–29)
CREAT SERPL-MCNC: 0.8 MG/DL (ref 0.5–1.4)
DIFFERENTIAL METHOD: ABNORMAL
EOSINOPHIL # BLD AUTO: 0 K/UL (ref 0–0.5)
EOSINOPHIL NFR BLD: 0 % (ref 0–8)
ERYTHROCYTE [DISTWIDTH] IN BLOOD BY AUTOMATED COUNT: 15.6 % (ref 11.5–14.5)
EST. GFR  (AFRICAN AMERICAN): >60 ML/MIN/1.73 M^2
EST. GFR  (NON AFRICAN AMERICAN): >60 ML/MIN/1.73 M^2
GLUCOSE SERPL-MCNC: 174 MG/DL (ref 70–110)
GRAM STN SPEC: NORMAL
GRAM STN SPEC: NORMAL
HCT VFR BLD AUTO: 28.5 % (ref 40–54)
HGB BLD-MCNC: 9.2 G/DL (ref 14–18)
IMM GRANULOCYTES # BLD AUTO: 0.01 K/UL (ref 0–0.04)
IMM GRANULOCYTES NFR BLD AUTO: 0.5 % (ref 0–0.5)
LYMPHOCYTES # BLD AUTO: 0.3 K/UL (ref 1–4.8)
LYMPHOCYTES NFR BLD: 15.6 % (ref 18–48)
MCH RBC QN AUTO: 29.8 PG (ref 27–31)
MCHC RBC AUTO-ENTMCNC: 32.3 G/DL (ref 32–36)
MCV RBC AUTO: 92 FL (ref 82–98)
MONOCYTES # BLD AUTO: 0.1 K/UL (ref 0.3–1)
MONOCYTES NFR BLD: 5.9 % (ref 4–15)
NEUTROPHILS # BLD AUTO: 1.6 K/UL (ref 1.8–7.7)
NEUTROPHILS NFR BLD: 78 % (ref 38–73)
NRBC BLD-RTO: 0 /100 WBC
PLATELET # BLD AUTO: 86 K/UL (ref 150–450)
PMV BLD AUTO: 10 FL (ref 9.2–12.9)
POTASSIUM SERPL-SCNC: 4.8 MMOL/L (ref 3.5–5.1)
RBC # BLD AUTO: 3.09 M/UL (ref 4.6–6.2)
SARS-COV-2 RDRP RESP QL NAA+PROBE: NEGATIVE
SODIUM SERPL-SCNC: 136 MMOL/L (ref 136–145)
VANCOMYCIN TROUGH SERPL-MCNC: 9.3 UG/ML (ref 10–22)
WBC # BLD AUTO: 2.05 K/UL (ref 3.9–12.7)

## 2022-03-10 PROCEDURE — 36415 COLL VENOUS BLD VENIPUNCTURE: CPT | Performed by: ORTHOPAEDIC SURGERY

## 2022-03-10 PROCEDURE — 63600175 PHARM REV CODE 636 W HCPCS: Performed by: ORTHOPAEDIC SURGERY

## 2022-03-10 PROCEDURE — 11000001 HC ACUTE MED/SURG PRIVATE ROOM

## 2022-03-10 PROCEDURE — 97530 THERAPEUTIC ACTIVITIES: CPT

## 2022-03-10 PROCEDURE — 94760 N-INVAS EAR/PLS OXIMETRY 1: CPT

## 2022-03-10 PROCEDURE — 97116 GAIT TRAINING THERAPY: CPT

## 2022-03-10 PROCEDURE — 85025 COMPLETE CBC W/AUTO DIFF WBC: CPT | Performed by: ORTHOPAEDIC SURGERY

## 2022-03-10 PROCEDURE — 99222 PR INITIAL HOSPITAL CARE,LEVL II: ICD-10-PCS | Mod: ,,, | Performed by: INTERNAL MEDICINE

## 2022-03-10 PROCEDURE — 97161 PT EVAL LOW COMPLEX 20 MIN: CPT

## 2022-03-10 PROCEDURE — 80048 BASIC METABOLIC PNL TOTAL CA: CPT | Performed by: ORTHOPAEDIC SURGERY

## 2022-03-10 PROCEDURE — 25000003 PHARM REV CODE 250: Performed by: ORTHOPAEDIC SURGERY

## 2022-03-10 PROCEDURE — 99222 1ST HOSP IP/OBS MODERATE 55: CPT | Mod: ,,, | Performed by: INTERNAL MEDICINE

## 2022-03-10 PROCEDURE — 80202 ASSAY OF VANCOMYCIN: CPT | Performed by: ORTHOPAEDIC SURGERY

## 2022-03-10 PROCEDURE — U0002 COVID-19 LAB TEST NON-CDC: HCPCS | Performed by: FAMILY MEDICINE

## 2022-03-10 RX ADMIN — PIPERACILLIN AND TAZOBACTAM 4.5 G: 4; .5 INJECTION, POWDER, LYOPHILIZED, FOR SOLUTION INTRAVENOUS; PARENTERAL at 12:03

## 2022-03-10 RX ADMIN — PIPERACILLIN AND TAZOBACTAM 4.5 G: 4; .5 INJECTION, POWDER, LYOPHILIZED, FOR SOLUTION INTRAVENOUS; PARENTERAL at 06:03

## 2022-03-10 RX ADMIN — PIPERACILLIN AND TAZOBACTAM 4.5 G: 4; .5 INJECTION, POWDER, LYOPHILIZED, FOR SOLUTION INTRAVENOUS; PARENTERAL at 04:03

## 2022-03-10 RX ADMIN — BICTEGRAVIR SODIUM, EMTRICITABINE, AND TENOFOVIR ALAFENAMIDE FUMARATE 1 TABLET: 50; 200; 25 TABLET ORAL at 10:03

## 2022-03-10 RX ADMIN — VANCOMYCIN HYDROCHLORIDE 1250 MG: 1.25 INJECTION, POWDER, LYOPHILIZED, FOR SOLUTION INTRAVENOUS at 12:03

## 2022-03-10 RX ADMIN — VANCOMYCIN HYDROCHLORIDE 1250 MG: 1.25 INJECTION, POWDER, LYOPHILIZED, FOR SOLUTION INTRAVENOUS at 10:03

## 2022-03-10 RX ADMIN — OXYCODONE AND ACETAMINOPHEN 1 TABLET: 7.5; 325 TABLET ORAL at 05:03

## 2022-03-10 NOTE — ANESTHESIA POSTPROCEDURE EVALUATION
Anesthesia Post Evaluation    Patient: Daniel Good    Procedure(s) Performed: Procedure(s) (LRB):  DEBRIDEMENT, BURSA, PATELLA (Right)    Final Anesthesia Type: general      Patient location during evaluation: PACU  Patient participation: Yes- Able to Participate  Level of consciousness: awake and alert  Post-procedure vital signs: reviewed and stable  Pain management: adequate  Airway patency: patent  SANDRA mitigation strategies: Multimodal analgesia  PONV status at discharge: No PONV  Anesthetic complications: no      Cardiovascular status: blood pressure returned to baseline and hemodynamically stable  Respiratory status: room air  Hydration status: euvolemic  Follow-up not needed.          Vitals Value Taken Time   /65 03/09/22 1926   Temp  03/09/22 1930   Pulse 66 03/09/22 1929   Resp 21 03/09/22 1929   SpO2 92 % 03/09/22 1929   Vitals shown include unvalidated device data.      Event Time   Out of Recovery 19:29:21         Pain/Phong Score: Pain Rating Prior to Med Admin: 7 (3/9/2022  6:57 PM)  Phong Score: 10 (3/9/2022  7:25 PM)

## 2022-03-10 NOTE — PROGRESS NOTES
[facility] Medicine  Progress Note    Patient Name: Daniel Good  MRN: [id number]  Patient Class: IP- Inpatient   Admission Date: 3/9/2022  Length of Stay: 1 days  Attending Physician: Maribell Alejandro*  Primary Care Provider: Autumn Marti MD        Subjective:     Principal Problem:Pyogenic arthritis of right knee joint        HPI:  Daniel Good is a 62 y/o M with PMH of  DVT (deep venous thrombosis), Hepatitis C, and HIV infection, resent to the emergency room with 2 weeks history of progressive right knee pain that is about 6/10. There is associated swelling, redness, drainage, blister and spread to the lower leg.  Denies fever chills or trauma.  Patient was seen at the urgent care last week and started on antibiotics for infection and steroid for possible gout. LE doppler negative for DVT, right knee xray with Prepatellar and infrapatellar soft tissue edema.      Overview/Hospital Course:  No notes on file    Interval History: awake and alert, s/p excision and debridement including bursectomy right knee pn 3/9  Continue vanc/Zosyn pending culture    Review of Systems   Constitutional:  Negative for fever.   HENT:  Negative for ear discharge, sinus pressure and tinnitus.    Eyes:  Negative for itching.   Respiratory:  Negative for apnea, shortness of breath and wheezing.    Cardiovascular:  Negative for chest pain.   Genitourinary:  Negative for flank pain and urgency.   Musculoskeletal:  Positive for arthralgias, joint swelling and myalgias.   Skin:  Positive for color change and rash.   Neurological:  Negative for syncope and light-headedness.   Psychiatric/Behavioral:  Negative for agitation.    Objective:     Vital Signs (Most Recent):  Temp: 97.5 °F (36.4 °C) (03/10/22 0833)  Pulse: 66 (03/10/22 0833)  Resp: 18 (03/10/22 0833)  BP: 106/62 (03/10/22 0833)  SpO2: 99 % (03/10/22 0833) Vital Signs (24h Range):  Temp:  [97.5 °F (36.4 °C)-98 °F (36.7 °C)] 97.5 °F (36.4  °C)  Pulse:  [] 66  Resp:  [15-22] 18  SpO2:  [90 %-99 %] 99 %  BP: ()/(59-72) 106/62     Weight: 79 kg (174 lb 2.6 oz)  Body mass index is 24.99 kg/m².    Intake/Output Summary (Last 24 hours) at 3/10/2022 1041  Last data filed at 3/10/2022 0607  Gross per 24 hour   Intake 2779.21 ml   Output 550 ml   Net 2229.21 ml      Physical Exam  Constitutional:       Appearance: He is well-developed.   HENT:      Head: Normocephalic and atraumatic.   Cardiovascular:      Rate and Rhythm: Normal rate and regular rhythm.      Heart sounds: Normal heart sounds. No murmur heard.    No friction rub. No gallop.   Pulmonary:      Effort: Pulmonary effort is normal.      Breath sounds: Normal breath sounds.   Abdominal:      General: Bowel sounds are normal.      Palpations: Abdomen is soft.      Tenderness: There is no abdominal tenderness.   Musculoskeletal:         General: Swelling, tenderness and deformity present.      Cervical back: Neck supple.      Right lower leg: Edema present.      Comments: Right knee dressing clean and dry.        Skin:     General: Skin is warm.      Findings: Erythema and rash present.      Comments: Petechia on the RLE.  Discoloration on both lower extremities   Neurological:      Mental Status: He is alert and oriented to person, place, and time.   Psychiatric:         Mood and Affect: Mood normal. Mood is not anxious or depressed.         Speech: Speech normal.       Significant Labs: ABGs: No results for input(s): PH, PCO2, HCO3, POCSATURATED, BE, TOTALHB, COHB, METHB, O2HB, POCFIO2, PO2 in the last 48 hours.  Blood Culture:   Recent Labs   Lab 03/09/22 0630 03/09/22 0637   LABBLOO No Growth to date No Growth to date     BMP:   Recent Labs   Lab 03/10/22  0521   *      K 4.8      CO2 24   BUN 22*   CREATININE 0.8   CALCIUM 8.2*     CBC:   Recent Labs   Lab 03/09/22  0637 03/10/22  0521   WBC 5.14 2.05*   HGB 9.9* 9.2*   HCT 29.7* 28.5*   * 86*     CMP:    Recent Labs   Lab 03/09/22  0637 03/10/22  0521    136   K 4.0 4.8    106   CO2 21* 24   * 174*   BUN 28* 22*   CREATININE 0.8 0.8   CALCIUM 8.1* 8.2*   PROT 6.1  --    ALBUMIN 2.6*  --    BILITOT 2.7*  --    ALKPHOS 98  --    AST 22  --    ALT 31  --    ANIONGAP 6* 6*   EGFRNONAA >60 >60     Lipase: No results for input(s): LIPASE in the last 48 hours.  Lipid Panel: No results for input(s): CHOL, HDL, LDLCALC, TRIG, CHOLHDL in the last 48 hours.  Prealbumin: No results for input(s): PREALBUMIN in the last 48 hours.  Respiratory Culture: No results for input(s): GSRESP, RESPIRATORYC in the last 48 hours.  TSH: No results for input(s): TSH in the last 4320 hours.  Urine Culture: No results for input(s): LABURIN in the last 48 hours.  Urine Studies:   Recent Labs   Lab 03/09/22  1435   COLORU Yellow   APPEARANCEUA Clear   PHUR 6.0   SPECGRAV >1.030*   PROTEINUA Negative   GLUCUA Negative   KETONESU Negative   BILIRUBINUA Negative   OCCULTUA Negative   NITRITE Negative   UROBILINOGEN Negative   LEUKOCYTESUR Negative       Significant Imaging: I have reviewed all pertinent imaging results/findings within the past 24 hours.      Assessment/Plan:      * Pyogenic arthritis of right knee joint  Cellulitis   Right knee xray: Prepatellar and infrapatellar soft tissue edema.  Soft tissue ultrasound reports Complex fluid collection in the prepatellar space, in the region of the swelling seen on clinical exam. This could be secondary to cellulitis and septic bursitis versus a hematoma.  Vanc and zosyn  Blood cx  Consult ortho: s/p excision and debridement including bursectomy right knee pn 3/9  Continue vanc/Zosyn pending culture  Consult infection disease  Holding DVT prophylaxis for possible I&D soon- resume DVT prophylaxis      Hepatitis C  Patient noted was told it resolved in 2015      Iron deficiency anemia  On chronic Fe tab      History of  DVT (deep venous thrombosis)  Not on medication   RLE  Doppler: No evidence of deep venous thrombosis in the right lower extremity.    HIV disease  On Biktarvy  CD4 > 200 on 12/2021  Viral load < 50 in 12.2021          VTE Risk Mitigation (From admission, onward)         Ordered     IP VTE LOW RISK PATIENT  Once         03/09/22 1319                Discharge Planning   JOSE:      Code Status: Full Code   Is the patient medically ready for discharge?:     Reason for patient still in hospital (select all that apply): Patient trending condition  Discharge Plan A: Home                  Mariblel Alejandro MD  Department of [facility] Medicine   [facility]

## 2022-03-10 NOTE — PLAN OF CARE
Problem: Physical Therapy Goal  Goal: Physical Therapy Goal  Description: Goals to be met by: 4/10/22     Patient will increase functional independence with mobility by performin. Sit to stand transfer with Modified Reagan  2. Bed to chair transfer with Modified Reagan.  3. Gait  x 300 feet with Mod I with or without an AD.    3/10/2022 1241 by Krystyna Samuels PT  Outcome: Ongoing, Progressing     PT Eval completed, note to follow. Pt WBAT RLE per Dr. Hernandez via secure chat. Pt reporting no pain. Significant edema noted RLE. Pt ambulated ~200 ft with no AD and with CGA then progressed to SBA-SPV. Recommending d/c home with OP PT. No DME needs at this time. Pt reports he owns DME - educated pt on proper fitting and use of crutches if necessary / pain increases.

## 2022-03-10 NOTE — TRANSFER OF CARE
"Anesthesia Transfer of Care Note    Patient: Daniel Good    Procedure(s) Performed: Procedure(s) (LRB):  DEBRIDEMENT, BURSA, PATELLA (Right)    Patient location: PACU    Anesthesia Type: general    Transport from OR: Transported from OR on 100% O2 by closed face mask with adequate spontaneous ventilation    Post pain: adequate analgesia    Post assessment: no apparent anesthetic complications    Post vital signs: stable    Level of consciousness: sedated    Nausea/Vomiting: no nausea/vomiting    Complications: none    Transfer of care protocol was followed      Last vitals:   Visit Vitals  /64 (BP Location: Left arm, Patient Position: Lying)   Pulse 67   Temp 36.4 °C (97.6 °F) (Oral)   Resp 16   Ht 5' 10" (1.778 m)   Wt 74.8 kg (165 lb)   SpO2 98%   BMI 23.68 kg/m²     "

## 2022-03-10 NOTE — PROGRESS NOTES
Pt arrived to unit. Introduced self as VN for this shift. Admission questions completed by VN. Educated pt on VTE risk, safety precautions, and VN's role in pt care. Opportunity given for pt's questions. All questions answered.      03/09/22 2121   Admission   Initial VN Admission Questions Complete   Shift   Virtual Nurse - Rounding Complete   Virtual Nurse - Patient Verbalized Approval Of Camera Use   Type of Frequent Check   Type Patient Rounds   Safety/Activity   Patient Rounds bed in low position;bed wheels locked;call light in patient/parent reach;clutter free environment maintained;ID band on;visualized patient;placement of personal items at bedside   Safety Promotion/Fall Prevention assistive device/personal item within reach   Activity Management Rolling - L1   Pain/Comfort/Sleep   Preferred Pain Scale number (Numeric Rating Pain Scale)   Sleep/Rest/Relaxation no problem identified
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
## 2022-03-10 NOTE — OP NOTE
[place] - Surgery ([facility])  Surgery Department  Operative Note    SUMMARY     Date of Procedure: 3/9/2022     Procedure: Procedure(s) (LRB):  DEBRIDEMENT, BURSA, PATELLA (Right)     Surgeon(s) and Role:     * Figueroa Hernandez Jr., MD - Primary    Assisting Surgeon: None    Pre-Operative Diagnosis: Pyogenic arthritis of right knee joint, due to unspecified organism [M00.9]    Post-Operative Diagnosis: Post-Op Diagnosis Codes:     * Pyogenic arthritis of right knee joint, due to unspecified organism [M00.9]    Anesthesia: General    Operative Findings (including complications, if any):  Septic bursitis right knee    Description of Technical Procedures:     Preop diagnosis:  Septic prepatellar bursitis right knee.    Postop diagnosis:  Same.    Operative procedure:  Excisional debridement including bursectomy right knee.    Surgeon:  David..    EBL:  30 cc.    Anesthesia:  General    Operative procedure in detail as follows:    After operative consent was obtained the patient brought the operating room placed supine operating table.  Anesthesia by general endotracheal method performed by the anesthesia staff.  After the patient was asleep a tourniquet applied right leg and right lower extremity prepped and draped out in the normal sterile fashion.  The leg was elevated and tourniquet inflated 300 mmHg.  Following this a longitudinal incision made centered over the prepatellar bursa with a 10 blade.  A large amount of purulent material was encountered and suctioned away and cultures were taken.  The wound thoroughly irrigated with antibiotic saline solution.  Debridement was performed of the entire bursal using the scissors and scalpel down to the level of the tendon and fascia the knee joint itself is not involved.  The wound irrigated again hemostasis achieved with Bovie and the wound packed with iodoform gauze.  Skin was closed loosely with interrupted 2-0 nylon suture.  Sterile bulky bandage applied followed by  light wrap tourniquet deflated.    Patient extubated brought to the recovery room in stable condition all sponge needle counts reported as correct no complications    Significant Surgical Tasks Conducted by the Assistant(s), if Applicable: none    Estimated Blood Loss (EBL): * No values recorded between 3/9/2022  5:46 PM and 3/9/2022  6:14 PM *           Implants: * No implants in log *    Specimens:   Specimen (24h ago, onward)            None                  Condition: Good    Disposition: PACU - hemodynamically stable.    Attestation: I was present and scrubbed for the entire procedure.

## 2022-03-10 NOTE — MEDICAL/APP STUDENT
Hospital Medicine Progress Note    Admit Date: 3/9/2022   LOS: 1 day     SUBJECTIVE:     Follow-up For: Septic Prepatellar Bursitis    HPI: Daniel Good is a 60 y/o M with PMH of  DVT (deep venous thrombosis), Hepatitis C, and HIV infection, resent to the emergency room with 2 weeks history of progressive right knee pain that is about 6/10. There is associated swelling, redness, drainage, blister and spread to the lower leg.  Denies fever chills or trauma.  Patient was seen at the urgent care last week and started on antibiotics for infection and steroid for possible gout. LE doppler negative for DVT, right knee xray with Prepatellar and infrapatellar soft tissue edema.    Interval History:  No acute events overnight. Pt was able to eat last night after his procedure and was eating breakfast when I visited this morning. Pt denies pain and vitals are WNL. Pt doing well.     Review of Systems:  CONSTITUTIONAL: Negative for fever or chills.  CARDIOVASCULAR: Negative for chest pain or palpitations.  RESPIRATORY: No shortness of breath. No cough.  GASTROINTESTINAL: Negative for nausea, vomiting, diarrhea    OBJECTIVE:     Vital Signs Range (Last 24H):  Temp:  [97.6 °F (36.4 °C)-98 °F (36.7 °C)]   Pulse:  []   Resp:  [15-29]   BP: ()/(59-72)   SpO2:  [90 %-100 %]     I & O (Last 24H):    Intake/Output Summary (Last 24 hours) at 3/10/2022 0830  Last data filed at 3/10/2022 0607  Gross per 24 hour   Intake 2779.21 ml   Output 550 ml   Net 2229.21 ml       Physical Exam:  GENERAL: Alert and oriented x 3, no apparent distress.  HEENT: AT, NC, PERRL  LUNGS: CTA with good air movement.  HEART: Regular rate and rhythm without murmur.   EXTREMITIES: petechiae present on RLE, non-pitting edema present, knee is wrapped in surgical dressing, hyperpigmentation of LLE and R foot. No cyanosis or loss on sensation. Skin of RLE is warm and dry.   NEUROLOGIC: Cranial nerves II through XII are grossly intact, no focal  deficits.     Laboratory:  CBC:  Recent Labs   Lab 03/10/22  0521   WBC 2.05*   RBC 3.09*   HGB 9.2*   HCT 28.5*   PLT 86*   MCV 92   MCH 29.8   MCHC 32.3     BMP:  Recent Labs   Lab 03/10/22  0521   *      K 4.8      CO2 24   BUN 22*   CREATININE 0.8   CALCIUM 8.2*       ASSESSMENT/PLAN:     Present on Admission:   Septic Prepatellar Bursitis/Cellulitis  - Right knee xray: Prepatellar and infrapatellar soft tissue edema.  - Soft tissue ultrasound reports Complex fluid collection in the prepatellar space, in the region of the swelling seen on clinical exam  - Vanc and zosyn  - Blood cx  - Holding DVT prophylaxis for possible I&D soon.   - Consulted ortho, performed I&D in OR with Dx Septic Prepatellar Bursitis  - Consult infection disease  - Pt on IV Vanc and Zosyn with no complications.   - aerobe/anaerobe cultures pending from surgery.   - Will start anticoagulation later today with Lovenox.    HIV disease   - On Biktarvy   - CD4 > 200 on 12/2021   - Viral load < 50 in 12.2021   History of  DVT (deep venous thrombosis)   - not on meds.   - RLE Doppler: No evidence of deep venous thrombosis in  the right lower extremity.   - will start anticoagulation regimen later today with Lovenox.    Iron deficiency anemia   - On chronic iron supplement   - pt is asymptomatic despite low H/H.    - will continue to monitor blood counts   Hepatitis C   - Pt stated it resolved in 2015.    - Liver enzymes are WNL.       ANTONIO Zayas-S2  [facility]   Physician Assistant Student

## 2022-03-10 NOTE — PROGRESS NOTES
"[place] [facility]  Orthopedics  Progress Note    Patient Name: Daniel Good  MRN: [id number]  Admission Date: 3/9/2022  Hospital Length of Stay: 1 days  Attending Provider: Maribell Alejandro*  Primary Care Provider: Autumn Marti MD  Follow-up For: Procedure(s) (LRB):  DEBRIDEMENT, BURSA, PATELLA (Right)  IRRIGATION AND DEBRIDEMENT, LOWER EXTREMITY    Post-Operative Day: 1 Day Post-Op  Subjective:     Principal Problem:Pyogenic arthritis of right knee joint    Principal Orthopedic Problem:  Bursitis right knee septic    Interval History:  Postop day 1 status post I&D right knee bursitis and bursectomy    Review of patient's allergies indicates:   Allergen Reactions    Aspirin Hives    Bactrim [sulfamethoxazole-trimethoprim] Hives    Ibuprofen Hives    Naproxen Hives    Sulfa (sulfonamide antibiotics)        Current Facility-Administered Medications   Medication    yqzkxxkkm-pcnjjfmr-vjflsjl ala -25 mg (25 kg or greater) 1 tablet    oxyCODONE-acetaminophen 7.5-325 mg per tablet 1 tablet    piperacillin-tazobactam 4.5 g in dextrose 5 % 100 mL IVPB (ready to mix system)    sodium chloride 0.9% flush 10 mL    vancomycin - pharmacy to dose    vancomycin 1.25 g in dextrose 5% 250 mL IVPB (ready to mix)     Objective:     Vital Signs (Most Recent):  Temp: 97.6 °F (36.4 °C) (03/10/22 1632)  Pulse: 75 (03/10/22 1632)  Resp: 20 (03/10/22 1726)  BP: (!) 115/58 (03/10/22 1632)  SpO2: 100 % (03/10/22 1632) Vital Signs (24h Range):  Temp:  [97.5 °F (36.4 °C)-99.6 °F (37.6 °C)] 97.6 °F (36.4 °C)  Pulse:  [] 75  Resp:  [15-22] 20  SpO2:  [90 %-100 %] 100 %  BP: ()/(58-73) 115/58     Weight: 79 kg (174 lb 2.6 oz)  Height: 5' 10" (177.8 cm)  Body mass index is 24.99 kg/m².      Intake/Output Summary (Last 24 hours) at 3/10/2022 1733  Last data filed at 3/10/2022 0607  Gross per 24 hour   Intake 2180.05 ml   Output 550 ml   Net 1630.05 ml       Ortho/SPM Exam dressing tacked right knee does " significant drainage range of motion limited    Significant Labs: All pertinent labs within the past 24 hours have been reviewed.    Significant Imaging: I have reviewed and interpreted all pertinent imaging results/findings.    Assessment/Plan:     Active Diagnoses:    Diagnosis Date Noted POA    PRINCIPAL PROBLEM:  Pyogenic arthritis of right knee joint [M00.9] 03/09/2022 Yes    Cellulitis [L03.90] 03/09/2022 Yes    HIV disease [B20] 03/09/2022 Yes    History of  DVT (deep venous thrombosis) [I82.409] 03/09/2022 Yes    Iron deficiency anemia [D50.9] 03/09/2022 Yes    Hepatitis C [B19.20] 03/09/2022 Yes      Problems Resolved During this Admission:     Plan:  Follow cultures.    PT to mobilize patient weight bear as tolerated  Plan dressing change and removal of packing tomorrow morning    Figueroa Hernandez Jr, MD  Orthopedics  [facility] [facility]

## 2022-03-10 NOTE — SUBJECTIVE & OBJECTIVE
Interval History: awake and alert, s/p excision and debridement including bursectomy right knee pn 3/9  Continue vanc/Zosyn pending culture    Review of Systems   Constitutional:  Negative for fever.   HENT:  Negative for ear discharge, sinus pressure and tinnitus.    Eyes:  Negative for itching.   Respiratory:  Negative for apnea, shortness of breath and wheezing.    Cardiovascular:  Negative for chest pain.   Genitourinary:  Negative for flank pain and urgency.   Musculoskeletal:  Positive for arthralgias, joint swelling and myalgias.   Skin:  Positive for color change and rash.   Neurological:  Negative for syncope and light-headedness.   Psychiatric/Behavioral:  Negative for agitation.    Objective:     Vital Signs (Most Recent):  Temp: 97.5 °F (36.4 °C) (03/10/22 0833)  Pulse: 66 (03/10/22 0833)  Resp: 18 (03/10/22 0833)  BP: 106/62 (03/10/22 0833)  SpO2: 99 % (03/10/22 0833) Vital Signs (24h Range):  Temp:  [97.5 °F (36.4 °C)-98 °F (36.7 °C)] 97.5 °F (36.4 °C)  Pulse:  [] 66  Resp:  [15-22] 18  SpO2:  [90 %-99 %] 99 %  BP: ()/(59-72) 106/62     Weight: 79 kg (174 lb 2.6 oz)  Body mass index is 24.99 kg/m².    Intake/Output Summary (Last 24 hours) at 3/10/2022 1041  Last data filed at 3/10/2022 0607  Gross per 24 hour   Intake 2779.21 ml   Output 550 ml   Net 2229.21 ml      Physical Exam  Constitutional:       Appearance: He is well-developed.   HENT:      Head: Normocephalic and atraumatic.   Cardiovascular:      Rate and Rhythm: Normal rate and regular rhythm.      Heart sounds: Normal heart sounds. No murmur heard.    No friction rub. No gallop.   Pulmonary:      Effort: Pulmonary effort is normal.      Breath sounds: Normal breath sounds.   Abdominal:      General: Bowel sounds are normal.      Palpations: Abdomen is soft.      Tenderness: There is no abdominal tenderness.   Musculoskeletal:         General: Swelling, tenderness and deformity present.      Cervical back: Neck supple.      Right  lower leg: Edema present.      Comments: Right knee dressing clean and dry.        Skin:     General: Skin is warm.      Findings: Erythema and rash present.      Comments: Petechia on the RLE.  Discoloration on both lower extremities   Neurological:      Mental Status: He is alert and oriented to person, place, and time.   Psychiatric:         Mood and Affect: Mood normal. Mood is not anxious or depressed.         Speech: Speech normal.       Significant Labs: ABGs: No results for input(s): PH, PCO2, HCO3, POCSATURATED, BE, TOTALHB, COHB, METHB, O2HB, POCFIO2, PO2 in the last 48 hours.  Blood Culture:   Recent Labs   Lab 03/09/22  0630 03/09/22 0637   LABBLOO No Growth to date No Growth to date     BMP:   Recent Labs   Lab 03/10/22  0521   *      K 4.8      CO2 24   BUN 22*   CREATININE 0.8   CALCIUM 8.2*     CBC:   Recent Labs   Lab 03/09/22  0637 03/10/22  0521   WBC 5.14 2.05*   HGB 9.9* 9.2*   HCT 29.7* 28.5*   * 86*     CMP:   Recent Labs   Lab 03/09/22  0637 03/10/22  0521    136   K 4.0 4.8    106   CO2 21* 24   * 174*   BUN 28* 22*   CREATININE 0.8 0.8   CALCIUM 8.1* 8.2*   PROT 6.1  --    ALBUMIN 2.6*  --    BILITOT 2.7*  --    ALKPHOS 98  --    AST 22  --    ALT 31  --    ANIONGAP 6* 6*   EGFRNONAA >60 >60     Lipase: No results for input(s): LIPASE in the last 48 hours.  Lipid Panel: No results for input(s): CHOL, HDL, LDLCALC, TRIG, CHOLHDL in the last 48 hours.  Prealbumin: No results for input(s): PREALBUMIN in the last 48 hours.  Respiratory Culture: No results for input(s): GSRESP, RESPIRATORYC in the last 48 hours.  TSH: No results for input(s): TSH in the last 4320 hours.  Urine Culture: No results for input(s): LABURIN in the last 48 hours.  Urine Studies:   Recent Labs   Lab 03/09/22  1435   COLORU Yellow   APPEARANCEUA Clear   PHUR 6.0   SPECGRAV >1.030*   PROTEINUA Negative   GLUCUA Negative   KETONESU Negative   BILIRUBINUA Negative   OCCULTUA  Negative   NITRITE Negative   UROBILINOGEN Negative   LEUKOCYTESUR Negative       Significant Imaging: I have reviewed all pertinent imaging results/findings within the past 24 hours.

## 2022-03-10 NOTE — PLAN OF CARE
Rigoberto met with pt at bedside to complete assessment. Pt will have Dylan Weeks roommate [phone] help him get home at d/c. Pt did not report any equipment or medication issues. SW will request f/u apts. White board updated with CM name and contact information.  Discharge brochure provided.  Pt encouraged to call with any questions or concerns.  Cm will continue to follow pt through transitions of care and assist with any discharge needs.    [facility] [phone]    Per Dr Marti. She requested we fax over Discharge Summary, Final ID Consult note ([employer] Resident), Final Micro Data. FAX: ATTN: DR Autumn Marti /[phone].          ANUP Ortiz  [phone]    Future Appointments   Date Time Provider Department Center   3/22/2022  9:30 AM Mckenzie Mcwilliams PA-C [facility] ORTHO [facility]        03/10/22 0932   Discharge Assessment   Assessment Type Discharge Planning Assessment   Confirmed/corrected address, phone number and insurance Yes   Confirmed Demographics Correct on Facesheet   Source of Information patient   When was your last doctors appointment?   (pt stated last month)   Communicated JOSE with patient/caregiver Yes   Reason For Admission Pyogenic arthritis of right knee joint   Lives With other (see comments)  (Dylan Weeks roommate [phone])   Facility Arrived From: Home   Do you expect to return to your current living situation? Yes   Do you have help at home or someone to help you manage your care at home? Yes   Who are your caregiver(s) and their phone number(s)? Dylan Weeks roommate [phone]   Prior to hospitilization cognitive status: Alert/Oriented   Current cognitive status: Alert/Oriented   Walking or Climbing Stairs Difficulty none   Dressing/Bathing Difficulty none   Home Layout Able to live on 1st floor   Equipment Currently Used at Home none   Readmission within 30 days? No   Patient currently being followed by outpatient case management? No   Do you  currently have service(s) that help you manage your care at home? No   Do you take prescription medications? Yes   Do you have prescription coverage? Yes   Coverage medicaid   Do you have any problems affording any of your prescribed medications? No   Is the patient taking medications as prescribed? yes   Who is going to help you get home at discharge? Dylan Weeks roommate [phone]   How do you get to doctors appointments? car, drives self   Are you on dialysis? No   Do you take coumadin? No   Discharge Plan A Home   DME Needed Upon Discharge  other (see comments)  (TBD)   Discharge Plan discussed with: Patient   Discharge Barriers Identified None

## 2022-03-10 NOTE — PLAN OF CARE
Problem: Adult Inpatient Plan of Care  Goal: Plan of Care Review  Outcome: Ongoing, Progressing     Patient arrived to unit from surgery around 2000. Pt oriented to room and plan of care, understanding verbalized. Pt is AAO x 4, on room air, denies SOB and pain, no distress noted. Pt's bandage is clean, dry and intact. All medication administered as prescribed, pt instructed to call if he begins to have any breakthrough pain. Pt is currently resting, bed in lowest position, HOB lowered, side rails up x 2, bed alarm activated, call light and bedside table within reach. Pt instructed to call if assistance is needed.

## 2022-03-10 NOTE — ASSESSMENT & PLAN NOTE
Cellulitis   Right knee xray: Prepatellar and infrapatellar soft tissue edema.  Soft tissue ultrasound reports Complex fluid collection in the prepatellar space, in the region of the swelling seen on clinical exam. This could be secondary to cellulitis and septic bursitis versus a hematoma.  Vanc and zosyn  Blood cx  Consult ortho: s/p excision and debridement including bursectomy right knee pn 3/9  Continue vanc/Zosyn pending culture  Consult infection disease  Holding DVT prophylaxis for possible I&D soon- resume DVT prophylaxis

## 2022-03-10 NOTE — PLAN OF CARE
Patient has met PACU discharge criteria, VSS, pain well controlled. Family updated by phone. Released from PACU by Dr. Singh

## 2022-03-11 VITALS
BODY MASS INDEX: 24.94 KG/M2 | RESPIRATION RATE: 20 BRPM | SYSTOLIC BLOOD PRESSURE: 125 MMHG | HEIGHT: 70 IN | DIASTOLIC BLOOD PRESSURE: 72 MMHG | HEART RATE: 76 BPM | WEIGHT: 174.19 LBS | OXYGEN SATURATION: 96 % | TEMPERATURE: 99 F

## 2022-03-11 LAB
ANION GAP SERPL CALC-SCNC: 5 MMOL/L (ref 8–16)
BASOPHILS # BLD AUTO: 0.01 K/UL (ref 0–0.2)
BASOPHILS NFR BLD: 0.3 % (ref 0–1.9)
BUN SERPL-MCNC: 19 MG/DL (ref 6–20)
CALCIUM SERPL-MCNC: 8.1 MG/DL (ref 8.7–10.5)
CHLORIDE SERPL-SCNC: 110 MMOL/L (ref 95–110)
CO2 SERPL-SCNC: 25 MMOL/L (ref 23–29)
CREAT SERPL-MCNC: 0.8 MG/DL (ref 0.5–1.4)
DIFFERENTIAL METHOD: ABNORMAL
EOSINOPHIL # BLD AUTO: 0.1 K/UL (ref 0–0.5)
EOSINOPHIL NFR BLD: 1.6 % (ref 0–8)
ERYTHROCYTE [DISTWIDTH] IN BLOOD BY AUTOMATED COUNT: 15.4 % (ref 11.5–14.5)
EST. GFR  (AFRICAN AMERICAN): >60 ML/MIN/1.73 M^2
EST. GFR  (NON AFRICAN AMERICAN): >60 ML/MIN/1.73 M^2
GLUCOSE SERPL-MCNC: 126 MG/DL (ref 70–110)
HCT VFR BLD AUTO: 29.9 % (ref 40–54)
HGB BLD-MCNC: 9.5 G/DL (ref 14–18)
IMM GRANULOCYTES # BLD AUTO: 0.01 K/UL (ref 0–0.04)
IMM GRANULOCYTES NFR BLD AUTO: 0.3 % (ref 0–0.5)
LYMPHOCYTES # BLD AUTO: 0.5 K/UL (ref 1–4.8)
LYMPHOCYTES NFR BLD: 14.9 % (ref 18–48)
MCH RBC QN AUTO: 29.6 PG (ref 27–31)
MCHC RBC AUTO-ENTMCNC: 31.8 G/DL (ref 32–36)
MCV RBC AUTO: 93 FL (ref 82–98)
MONOCYTES # BLD AUTO: 0.4 K/UL (ref 0.3–1)
MONOCYTES NFR BLD: 13.4 % (ref 4–15)
NEUTROPHILS # BLD AUTO: 2.2 K/UL (ref 1.8–7.7)
NEUTROPHILS NFR BLD: 69.5 % (ref 38–73)
NRBC BLD-RTO: 0 /100 WBC
PLATELET # BLD AUTO: 115 K/UL (ref 150–450)
PMV BLD AUTO: 10.5 FL (ref 9.2–12.9)
POTASSIUM SERPL-SCNC: 3.9 MMOL/L (ref 3.5–5.1)
RBC # BLD AUTO: 3.21 M/UL (ref 4.6–6.2)
SODIUM SERPL-SCNC: 140 MMOL/L (ref 136–145)
WBC # BLD AUTO: 3.22 K/UL (ref 3.9–12.7)

## 2022-03-11 PROCEDURE — 94761 N-INVAS EAR/PLS OXIMETRY MLT: CPT

## 2022-03-11 PROCEDURE — 63600175 PHARM REV CODE 636 W HCPCS: Performed by: ORTHOPAEDIC SURGERY

## 2022-03-11 PROCEDURE — 36415 COLL VENOUS BLD VENIPUNCTURE: CPT | Performed by: ORTHOPAEDIC SURGERY

## 2022-03-11 PROCEDURE — 63600175 PHARM REV CODE 636 W HCPCS: Performed by: FAMILY MEDICINE

## 2022-03-11 PROCEDURE — 80048 BASIC METABOLIC PNL TOTAL CA: CPT | Performed by: ORTHOPAEDIC SURGERY

## 2022-03-11 PROCEDURE — 25000003 PHARM REV CODE 250: Performed by: ORTHOPAEDIC SURGERY

## 2022-03-11 PROCEDURE — 25000003 PHARM REV CODE 250: Performed by: FAMILY MEDICINE

## 2022-03-11 PROCEDURE — 85025 COMPLETE CBC W/AUTO DIFF WBC: CPT | Performed by: ORTHOPAEDIC SURGERY

## 2022-03-11 RX ADMIN — PIPERACILLIN AND TAZOBACTAM 4.5 G: 4; .5 INJECTION, POWDER, LYOPHILIZED, FOR SOLUTION INTRAVENOUS; PARENTERAL at 04:03

## 2022-03-11 RX ADMIN — VANCOMYCIN HYDROCHLORIDE 1500 MG: 1.5 INJECTION, POWDER, LYOPHILIZED, FOR SOLUTION INTRAVENOUS at 01:03

## 2022-03-11 NOTE — DISCHARGE SUMMARY
"[facility] Medicine  Discharge Summary      Patient Name: Daniel Good  MRN: [id number]  Patient Class: IP- Inpatient  Admission Date: 3/9/2022  Hospital Length of Stay: 2 days  Discharge Date and Time: 3/11/2022 11:29 AM  Attending Physician: No att. providers found   Discharging Provider: Maribell Alejandro MD  Primary Care Provider: Autumn Marti MD      HPI:   Daniel Good is a 60 y/o M with PMH of  DVT (deep venous thrombosis), Hepatitis C, and HIV infection, resent to the emergency room with 2 weeks history of progressive right knee pain that is about 6/10. There is associated swelling, redness, drainage, blister and spread to the lower leg.  Denies fever chills or trauma.  Patient was seen at the urgent care last week and started on antibiotics for infection and steroid for possible gout. LE doppler negative for DVT, right knee xray with Prepatellar and infrapatellar soft tissue edema.      Procedure(s) (LRB):  DEBRIDEMENT, BURSA, PATELLA (Right)  IRRIGATION AND DEBRIDEMENT, LOWER EXTREMITY      Hospital Course:   Admitted with pyogenic arthritis of right knee joint and cellulitis of the RLE, started on vanc and Zosyn.  Consult orthopedics s/p excision and debridement including bursectomy right knee on 3/9.  Consult Infectious Disease recommend continuing vanc and Zosyn pending culture.  Patient left AMA        Goals of Care Treatment Preferences:  Code Status: Full Code      Consults:   Consults (From admission, onward)        Status Ordering Provider     Pharmacy to dose Vancomycin consult  Once        Provider:  (Not yet assigned)   "And" Linked Group Details    Acknowledged LOR HERNANDEZ JR     Orthopedics  Once        Provider:  Lor Hernandez Jr., MD    Acknowledged LOR HERNANDEZ JR     Infectious Disease  Once        Provider:  (Not yet assigned)    Completed LOR HERNANDEZ JR     Inpatient consult to Orthopedic Surgery  Once        Provider:  Lor ARMENDARIZ" David Srivastava MD    Completed SANDIE RODRIGEZ          * Pyogenic arthritis of right knee joint  Cellulitis   Right knee xray: Prepatellar and infrapatellar soft tissue edema.  Soft tissue ultrasound reports Complex fluid collection in the prepatellar space, in the region of the swelling seen on clinical exam. This could be secondary to cellulitis and septic bursitis versus a hematoma.  Vanc and zosyn  Blood cx  Consult ortho: s/p excision and debridement including bursectomy right knee pn 3/9  Continue vanc/Zosyn pending culture  Consult infection disease- appreciates rec's continue present present pending cx  Holding DVT prophylaxis for possible I&D soon- resume DVT prophylaxis  Wound culture Staphylococcus aureus  susceptibility pending  Left AMA    Hepatitis C  Patient noted was told it resolved in 2015      Iron deficiency anemia  On chronic Fe tab      History of  DVT (deep venous thrombosis)  Not on medication   RLE Doppler: No evidence of deep venous thrombosis in the right lower extremity.    HIV disease  On Biktarvy  CD4 > 200 on 12/2021  Viral load < 50 in 12.2021          Final Active Diagnoses:    Diagnosis Date Noted POA    PRINCIPAL PROBLEM:  Pyogenic arthritis of right knee joint [M00.9] 03/09/2022 Yes    Cellulitis [L03.90] 03/09/2022 Yes    HIV disease [B20] 03/09/2022 Yes    History of  DVT (deep venous thrombosis) [I82.409] 03/09/2022 Yes    Iron deficiency anemia [D50.9] 03/09/2022 Yes    Hepatitis C [B19.20] 03/09/2022 Yes      Problems Resolved During this Admission:       Discharged Condition: against medical advice    Disposition: Left Against Medical Adv*    Follow Up:   Follow-up Information     Autumn Marti MD Follow up.    Specialty: Infectious Diseases  Why: Discharge Summary, Final ID Consult note ([facility] Resident), Final Micro Data needs to be. FAX: ATTN: DR Autumn Marti [phone].  Contact information:  [address] [place] [address]  [phone]                        Patient Instructions:   No discharge procedures on file.        Pending Diagnostic Studies:     None         Medications:  None    Indwelling Lines/Drains at time of discharge:   Lines/Drains/Airways     None                 Time spent on the discharge of patient: 35 minutes         Maribell Alejandro MD  Department of [facility] Medicine  [facility]

## 2022-03-11 NOTE — PT/OT/SLP PROGRESS
Physical Therapy      Patient Name:  Daniel Good   MRN:  [id number]    Patient not seen today secondary to  (pt refusal,pt states he is leaving AMA). Will follow-up n/a.

## 2022-03-11 NOTE — HOSPITAL COURSE
Admitted with pyogenic arthritis of right knee joint and cellulitis of the RLE, started on vanc and Zosyn.  Consult orthopedics s/p excision and debridement including bursectomy right knee on 3/9.  Consult Infectious Disease recommend continuing vanc and Zosyn pending culture.  Patient left AMA

## 2022-03-11 NOTE — ASSESSMENT & PLAN NOTE
Cellulitis   Right knee xray: Prepatellar and infrapatellar soft tissue edema.  Soft tissue ultrasound reports Complex fluid collection in the prepatellar space, in the region of the swelling seen on clinical exam. This could be secondary to cellulitis and septic bursitis versus a hematoma.  Vanc and zosyn  Blood cx  Consult ortho: s/p excision and debridement including bursectomy right knee pn 3/9  Continue vanc/Zosyn pending culture  Consult infection disease- appreciates rec's continue present present pending cx  Holding DVT prophylaxis for possible I&D soon- resume DVT prophylaxis  Wound culture Staphylococcus aureus  susceptibility pending  Left AMA

## 2022-03-11 NOTE — PROGRESS NOTES
[facility] Medicine  Progress Note    Patient Name: Daniel Good  MRN: [id number]  Patient Class: IP- Inpatient   Admission Date: 3/9/2022  Length of Stay: 2 days  Attending Physician: No att. providers found  Primary Care Provider: Autumn Marti MD        Subjective:     Principal Problem:Pyogenic arthritis of right knee joint        HPI:  Daniel Good is a 60 y/o M with PMH of  DVT (deep venous thrombosis), Hepatitis C, and HIV infection, resent to the emergency room with 2 weeks history of progressive right knee pain that is about 6/10. There is associated swelling, redness, drainage, blister and spread to the lower leg.  Denies fever chills or trauma.  Patient was seen at the urgent care last week and started on antibiotics for infection and steroid for possible gout. LE doppler negative for DVT, right knee xray with Prepatellar and infrapatellar soft tissue edema.      Overview/Hospital Course:  No notes on file    Interval History: awake and alert, s/p excision and debridement including bursectomy right knee pn 3/9  Continue vanc/Zosyn pending culture per ID  Wound culture Staphylococcus aureus  susceptibility pending    Review of Systems   Constitutional:  Negative for fever.   HENT:  Negative for ear discharge, sinus pressure and tinnitus.    Eyes:  Negative for itching.   Respiratory:  Negative for apnea, shortness of breath and wheezing.    Cardiovascular:  Negative for chest pain.   Genitourinary:  Negative for flank pain and urgency.   Musculoskeletal:  Positive for arthralgias, joint swelling and myalgias.   Skin:  Positive for color change and rash.   Neurological:  Negative for syncope and light-headedness.   Psychiatric/Behavioral:  Negative for agitation.    Objective:     Vital Signs (Most Recent):  Temp: 98.6 °F (37 °C) (03/11/22 0902)  Pulse: 76 (03/11/22 0902)  Resp: 20 (03/11/22 0902)  BP: 125/72 (03/11/22 0902)  SpO2: 96 % (03/11/22 0902) Vital Signs (24h  Range):  Temp:  [97.4 °F (36.3 °C)-99.6 °F (37.6 °C)] 98.6 °F (37 °C)  Pulse:  [65-80] 76  Resp:  [18-20] 20  SpO2:  [96 %-100 %] 96 %  BP: (114-130)/(58-73) 125/72     Weight: 79 kg (174 lb 2.6 oz)  Body mass index is 24.99 kg/m².    Intake/Output Summary (Last 24 hours) at 3/11/2022 1006  Last data filed at 3/11/2022 0625  Gross per 24 hour   Intake 1649.86 ml   Output 300 ml   Net 1349.86 ml        Physical Exam  Constitutional:       Appearance: He is well-developed.   HENT:      Head: Normocephalic and atraumatic.   Cardiovascular:      Rate and Rhythm: Normal rate and regular rhythm.      Heart sounds: Normal heart sounds. No murmur heard.    No friction rub. No gallop.   Pulmonary:      Effort: Pulmonary effort is normal.      Breath sounds: Normal breath sounds.   Abdominal:      General: Bowel sounds are normal.      Palpations: Abdomen is soft.      Tenderness: There is no abdominal tenderness.   Musculoskeletal:         General: Swelling, tenderness and deformity present.      Cervical back: Neck supple.      Right lower leg: Edema present.      Comments: Right knee dressing clean and dry.        Skin:     General: Skin is warm.      Findings: Erythema and rash present.      Comments: Petechia on the RLE.  Discoloration on both lower extremities   Neurological:      Mental Status: He is alert and oriented to person, place, and time.   Psychiatric:         Mood and Affect: Mood normal. Mood is not anxious or depressed.         Speech: Speech normal.       Significant Labs: ABGs: No results for input(s): PH, PCO2, HCO3, POCSATURATED, BE, TOTALHB, COHB, METHB, O2HB, POCFIO2, PO2 in the last 48 hours.  Blood Culture:   No results for input(s): LABBLOO in the last 48 hours.    BMP:   Recent Labs   Lab 03/11/22  0558   *      K 3.9      CO2 25   BUN 19   CREATININE 0.8   CALCIUM 8.1*       CBC:   Recent Labs   Lab 03/10/22  0521 03/11/22  0558   WBC 2.05* 3.22*   HGB 9.2* 9.5*   HCT 28.5*  29.9*   PLT 86* 115*       CMP:   Recent Labs   Lab 03/10/22  0521 03/11/22  0558    140   K 4.8 3.9    110   CO2 24 25   * 126*   BUN 22* 19   CREATININE 0.8 0.8   CALCIUM 8.2* 8.1*   ANIONGAP 6* 5*   EGFRNONAA >60 >60       Lipase: No results for input(s): LIPASE in the last 48 hours.  Lipid Panel: No results for input(s): CHOL, HDL, LDLCALC, TRIG, CHOLHDL in the last 48 hours.  Prealbumin: No results for input(s): PREALBUMIN in the last 48 hours.  Respiratory Culture: No results for input(s): GSRESP, RESPIRATORYC in the last 48 hours.  TSH: No results for input(s): TSH in the last 4320 hours.  Urine Culture: No results for input(s): LABURIN in the last 48 hours.  Urine Studies:   Recent Labs   Lab 03/09/22  1435   COLORU Yellow   APPEARANCEUA Clear   PHUR 6.0   SPECGRAV >1.030*   PROTEINUA Negative   GLUCUA Negative   KETONESU Negative   BILIRUBINUA Negative   OCCULTUA Negative   NITRITE Negative   UROBILINOGEN Negative   LEUKOCYTESUR Negative         Significant Imaging: I have reviewed all pertinent imaging results/findings within the past 24 hours.      Assessment/Plan:      * Pyogenic arthritis of right knee joint  Cellulitis   Right knee xray: Prepatellar and infrapatellar soft tissue edema.  Soft tissue ultrasound reports Complex fluid collection in the prepatellar space, in the region of the swelling seen on clinical exam. This could be secondary to cellulitis and septic bursitis versus a hematoma.  Vanc and zosyn  Blood cx  Consult ortho: s/p excision and debridement including bursectomy right knee pn 3/9  Continue vanc/Zosyn pending culture  Consult infection disease- appreciates rec's continue present present pending cx  Holding DVT prophylaxis for possible I&D soon- resume DVT prophylaxis  Wound culture Staphylococcus aureus  susceptibility pending    Hepatitis C  Patient noted was told it resolved in 2015      Iron deficiency anemia  On chronic Fe tab      History of  DVT (deep  venous thrombosis)  Not on medication   RLE Doppler: No evidence of deep venous thrombosis in the right lower extremity.    HIV disease  On Biktarvy  CD4 > 200 on 12/2021  Viral load < 50 in 12.2021          VTE Risk Mitigation (From admission, onward)         Ordered     IP VTE LOW RISK PATIENT  Once         03/09/22 1319                Discharge Planning   JOSE:      Code Status: Full Code   Is the patient medically ready for discharge?:     Reason for patient still in hospital (select all that apply): Patient trending condition  Discharge Plan A: Home                  Maribell Alejandro MD  Department of [facility] Medicine   [facility]

## 2022-03-11 NOTE — PLAN OF CARE
Problem: Adult Inpatient Plan of Care  Goal: Plan of Care Review  Outcome: Ongoing, Progressing       Plan of care reviewed with patient, understanding verbalized. Pt is AAO x 4, on room air, denies SOB and pain, no distress noted. Pt's bandage is still clean, dry and intact, no complaints of pain at this time. All medication administered as prescribed. Pt is currently resting, bed in lowest position, HOB lowered, side rails up x 2, bed alarm activated, call light and bedside table within reach. Pt instructed to call if assistance is needed.

## 2022-03-11 NOTE — PROGRESS NOTES
[facility] Infectious Diseases Progress Note    Assessment/Plan:     Septic pre-patellar bursitis of R knee  Hx of HIV, Hep C, cryptogenic cirrhosis, PVT, esophageal varices on BB, and DVT who presented for 2 weeks of R knee pain and swelling, s/p 10 days of augmentin with persistence of swelling, redness, and drainage, XR knee with pre-patellar and infrapatellar soft tissue edema, US soft tissue with sub Q edema, and outside of joint has complex fluid collection S/p excisional debridement and bursectomy of R knee joint on  with large amount of purulent fluid drained.  - Blood culture NGTD  - wound cultures with staph aureus, susceptibility pending  Patient left AMA. Very important for patient to have 6 weeks of IV antibiotics.     Kathie Stock MD  [facility] IM/Peds PGY-2  [facility] Infectious Disease Consult Service  Cell: [phone]    Thank you for allowing us to participate in the care of this patient. Please re-consult us if patient returns to the hospital.     Subjective:      Patient left AMA because of personal situation with work.      Objective:   Last 24 Hour Vital Signs:  BP  Min: 114/65  Max: 130/71  Temp  Av.1 °F (36.7 °C)  Min: 97.4 °F (36.3 °C)  Max: 99.6 °F (37.6 °C)  Pulse  Av.4  Min: 65  Max: 80  Resp  Av.6  Min: 18  Max: 20  SpO2  Av.1 %  Min: 96 %  Max: 100 %  I/O last 3 completed shifts:  In: 2679.9 [P.O.:1520; IV Piggyback:1159.9]  Out: 850 [Urine:850]    Physical Exam    Laboratory:  Laboratory Data Reviewed: yes  Pertinent Findings:  Recent Labs   Lab 22  0637 03/10/22  0521 22  0558   WBC 5.14 2.05* 3.22*   HGB 9.9* 9.2* 9.5*   HCT 29.7* 28.5* 29.9*   * 86* 115*   MCV 90 92 93   RDW 16.0* 15.6* 15.4*    136 140   K 4.0 4.8 3.9    106 110   CO2 21* 24 25   BUN 28* 22* 19   CREATININE 0.8 0.8 0.8   * 174* 126*   PROT 6.1  --   --    ALBUMIN 2.6*  --   --    BILITOT 2.7*  --   --    AST 22  --   --    ALKPHOS 98  --   --    ALT 31  --   --   "        Microbiology Data:  Microbiology Results (last 7 days)       Procedure Component Value Units Date/Time    Culture, Anaerobe [[id number]] Collected: 03/09/22 1834    Order Status: Completed Specimen: Wound from Knee, Right Updated: 03/11/22 0941     Anaerobic Culture Culture in progress    Aerobic culture [[id number]]  (Abnormal) Collected: 03/09/22 1834    Order Status: Completed Specimen: Wound from Knee, Right Updated: 03/11/22 0816     Aerobic Bacterial Culture STAPHYLOCOCCUS AUREUS  Rare  Susceptibility pending      Blood culture x two cultures. Draw prior to antibiotics. [[id number]] Collected: 03/09/22 0630    Order Status: Completed Specimen: Blood from Peripheral, Forearm, Right Updated: 03/10/22 1212     Blood Culture, Routine No Growth to date      No Growth to date    Narrative:      Aerobic and anaerobic    Blood culture x two cultures. Draw prior to antibiotics. [[id number]] Collected: 03/09/22 0637    Order Status: Completed Specimen: Blood from Peripheral, Forearm, Left Updated: 03/10/22 1212     Blood Culture, Routine No Growth to date      No Growth to date    Narrative:      Aerobic and anaerobic    Gram stain [[id number]] Collected: 03/09/22 1834    Order Status: Completed Specimen: Wound from Knee, Right Updated: 03/10/22 0120     Gram Stain Result Rare WBC's      No organisms seen              Antimicrobials:  Antibiotics (From admission, onward)                Start     Stop Route Frequency Ordered    03/11/22 0115  vancomycin 1.5 g in dextrose 5 % 250 mL IVPB (ready to mix)         -- IV Every 12 hours (non-standard times) 03/11/22 0016    03/09/22 1900  piperacillin-tazobactam 4.5 g in dextrose 5 % 100 mL IVPB (ready to mix system)         -- IV Every 8 hours (non-standard times) 03/09/22 1319    03/09/22 1319  vancomycin - pharmacy to dose  (vancomycin IVPB)        "And" Linked Group Details    -- IV pharmacy to manage frequency 03/09/22 1319              Other Results:  Radiology " Results:  X-Ray Knee 3 View Right    Result Date: 3/9/2022  EXAMINATION: XR KNEE 3 VIEW RIGHT CLINICAL HISTORY: Pain, unspecified TECHNIQUE: AP, lateral, and Merchant views of the right knee were performed. COMPARISON: None FINDINGS: There is no acute fracture or dislocation. Alignment is normal. Joint spaces are preserved. No joint effusion.  There is prepatellar and infrapatellar soft tissue edema.     No acute osseous abnormality of the right knee. Prepatellar and infrapatellar soft tissue edema. Electronically signed by: Ld Estrada Date:    03/09/2022 Time:    07:26    US Lower Extremity Veins Right    Result Date: 3/9/2022  EXAMINATION: US LOWER EXTREMITY VEINS RIGHT CLINICAL HISTORY: Edema, unspecified TECHNIQUE: Duplex and color flow Doppler evaluation and graded compression of the right lower extremity veins was performed. COMPARISON: None FINDINGS: Right thigh veins: The common femoral, femoral, popliteal, upper greater saphenous, and deep femoral veins are patent and free of thrombus. The veins are normally compressible and have normal phasic flow and augmentation response. Right calf veins: The visualized calf veins are patent. Contralateral CFV: The contralateral (left) common femoral vein is patent and free of thrombus. Miscellaneous: None     No evidence of deep venous thrombosis in the right lower extremity. Electronically signed by: Hallie Avalos Date:    03/09/2022 Time:    09:00    US Soft Tissue, Lower Extremity, Right    Result Date: 3/9/2022  EXAMINATION: US SOFT TISSUE, LOWER EXTREMITY, RIGHT CLINICAL HISTORY: Knee pain, swelling; TECHNIQUE: Real-time grayscale and color Doppler imaging of the anterior right knee, in the region of swelling, was performed. COMPARISON: Knee radiograph performed earlier today FINDINGS: Images of the anterior right knee, in the region of swelling, demonstrates diffuse subcutaneous edema.  Outside of the joint, in the prepatellar space, there is a complex  appearing fluid collection that measures 3.1 x 1.0 x 3.1 cm.  No flow is seen within this collection on color Doppler imaging.     Complex fluid collection in the prepatellar space, in the region of the swelling seen on clinical exam.  This could be secondary to cellulitis and septic bursitis versus a hematoma. Electronically signed by resident: Hallie Avalos Date:    03/09/2022 Time:    09:00 Electronically signed by: Hallie Avalos Date:    03/09/2022 Time:    09:10      Current Medications:     Infusions:       Scheduled:   gwthiqjlp-iljaentv-tzokbmu ala  1 tablet Oral Daily    piperacillin-tazobactam (ZOSYN) IVPB  4.5 g Intravenous Q8H    vancomycin (VANCOCIN) IVPB  1,500 mg Intravenous Q12H        PRN:  oxyCODONE-acetaminophen, sodium chloride 0.9%, Pharmacy to dose Vancomycin consult **AND** vancomycin - pharmacy to dose

## 2022-03-11 NOTE — ASSESSMENT & PLAN NOTE
Cellulitis   Right knee xray: Prepatellar and infrapatellar soft tissue edema.  Soft tissue ultrasound reports Complex fluid collection in the prepatellar space, in the region of the swelling seen on clinical exam. This could be secondary to cellulitis and septic bursitis versus a hematoma.  Vanc and zosyn  Blood cx  Consult ortho: s/p excision and debridement including bursectomy right knee pn 3/9  Continue vanc/Zosyn pending culture  Consult infection disease- appreciates rec's continue present present pending cx  Holding DVT prophylaxis for possible I&D soon- resume DVT prophylaxis  Wound culture Staphylococcus aureus  susceptibility pending

## 2022-03-11 NOTE — PROGRESS NOTES
Pharmacokinetic Assessment Follow Up: IV Vancomycin    Vancomycin serum concentration assessment(s):    The trough level was drawn correctly and can be used to guide therapy at this time. The measurement is below the desired definitive target range of 15 to 20 mcg/mL.    Vancomycin Regimen Plan:    Change regimen to Vancomycin 1500 mg IV every 12 hours with next serum trough concentration measured at 1215 prior to 4th dose on 3/12    Drug levels (last 3 results):  Recent Labs   Lab Result Units 03/10/22  2304   Vancomycin-Trough ug/mL 9.3*       Pharmacy will continue to follow and monitor vancomycin.    Please contact pharmacy at extension [phone] for questions regarding this assessment.    Thank you for the consult,   Marleny Baxter       Patient brief summary:  Daniel Good is a 61 y.o. male initiated on antimicrobial therapy with IV Vancomycin for treatment of bone/joint infection    The patient's current regimen is 1250 mg q12h    Drug Allergies:   Review of patient's allergies indicates:   Allergen Reactions    Aspirin Hives    Bactrim [sulfamethoxazole-trimethoprim] Hives    Ibuprofen Hives    Naproxen Hives    Sulfa (sulfonamide antibiotics)        Actual Body Weight:   79 kg    Renal Function:   Estimated Creatinine Clearance: 102.7 mL/min (based on SCr of 0.8 mg/dL).,     Dialysis Method (if applicable):  N/A    CBC (last 72 hours):  Recent Labs   Lab Result Units 03/09/22  0637 03/10/22  0521   WBC K/uL 5.14 2.05*   Hemoglobin g/dL 9.9* 9.2*   Hematocrit % 29.7* 28.5*   Platelets K/uL 126* 86*   Gran % % 69.0 78.0*   Lymph % % 14.6* 15.6*   Mono % % 11.9 5.9   Eosinophil % % 3.7 0.0   Basophil % % 0.4 0.0   Differential Method  Automated Automated       Metabolic Panel (last 72 hours):  Recent Labs   Lab Result Units 03/09/22  0637 03/09/22  1435 03/10/22  0521   Sodium mmol/L 137  --  136   Potassium mmol/L 4.0  --  4.8   Chloride mmol/L 110  --  106   CO2 mmol/L 21*  --  24   Glucose mg/dL  150*  --  174*   Glucose, UA   --  Negative  --    BUN mg/dL 28*  --  22*   Creatinine mg/dL 0.8  --  0.8   Albumin g/dL 2.6*  --   --    Total Bilirubin mg/dL 2.7*  --   --    Alkaline Phosphatase U/L 98  --   --    AST U/L 22  --   --    ALT U/L 31  --   --        Vancomycin Administrations:  vancomycin given in the last 96 hours                     vancomycin 1.25 g in dextrose 5% 250 mL IVPB (ready to mix) (mg) 1,250 mg New Bag 03/10/22 1059     1,250 mg New Bag  0030    vancomycin injection (g) 1 g Given 03/09/22 1832    vancomycin (VANCOCIN) 1,750 mg in dextrose 5 % 500 mL IVPB (mg) 1,750 mg New Bag 03/09/22 1205                    Microbiologic Results:  Microbiology Results (last 7 days)       Procedure Component Value Units Date/Time    Blood culture x two cultures. Draw prior to antibiotics. [[id number]] Collected: 03/09/22 0630    Order Status: Completed Specimen: Blood from Peripheral, Forearm, Right Updated: 03/10/22 1212     Blood Culture, Routine No Growth to date      No Growth to date    Narrative:      Aerobic and anaerobic    Blood culture x two cultures. Draw prior to antibiotics. [[id number]] Collected: 03/09/22 0637    Order Status: Completed Specimen: Blood from Peripheral, Forearm, Left Updated: 03/10/22 1212     Blood Culture, Routine No Growth to date      No Growth to date    Narrative:      Aerobic and anaerobic    Gram stain [[id number]] Collected: 03/09/22 1834    Order Status: Completed Specimen: Wound from Knee, Right Updated: 03/10/22 0120     Gram Stain Result Rare WBC's      No organisms seen    Culture, Anaerobe [[id number]] Collected: 03/09/22 1834    Order Status: Sent Specimen: Wound from Knee, Right Updated: 03/10/22 0052    Aerobic culture [[id number]] Collected: 03/09/22 1834    Order Status: Sent Specimen: Wound from Knee, Right Updated: 03/10/22 0052

## 2022-03-11 NOTE — SUBJECTIVE & OBJECTIVE
Interval History: awake and alert, s/p excision and debridement including bursectomy right knee pn 3/9  Continue vanc/Zosyn pending culture per ID  Wound culture Staphylococcus aureus  susceptibility pending    Review of Systems   Constitutional:  Negative for fever.   HENT:  Negative for ear discharge, sinus pressure and tinnitus.    Eyes:  Negative for itching.   Respiratory:  Negative for apnea, shortness of breath and wheezing.    Cardiovascular:  Negative for chest pain.   Genitourinary:  Negative for flank pain and urgency.   Musculoskeletal:  Positive for arthralgias, joint swelling and myalgias.   Skin:  Positive for color change and rash.   Neurological:  Negative for syncope and light-headedness.   Psychiatric/Behavioral:  Negative for agitation.    Objective:     Vital Signs (Most Recent):  Temp: 98.6 °F (37 °C) (03/11/22 0902)  Pulse: 76 (03/11/22 0902)  Resp: 20 (03/11/22 0902)  BP: 125/72 (03/11/22 0902)  SpO2: 96 % (03/11/22 0902) Vital Signs (24h Range):  Temp:  [97.4 °F (36.3 °C)-99.6 °F (37.6 °C)] 98.6 °F (37 °C)  Pulse:  [65-80] 76  Resp:  [18-20] 20  SpO2:  [96 %-100 %] 96 %  BP: (114-130)/(58-73) 125/72     Weight: 79 kg (174 lb 2.6 oz)  Body mass index is 24.99 kg/m².    Intake/Output Summary (Last 24 hours) at 3/11/2022 1006  Last data filed at 3/11/2022 0625  Gross per 24 hour   Intake 1649.86 ml   Output 300 ml   Net 1349.86 ml        Physical Exam  Constitutional:       Appearance: He is well-developed.   HENT:      Head: Normocephalic and atraumatic.   Cardiovascular:      Rate and Rhythm: Normal rate and regular rhythm.      Heart sounds: Normal heart sounds. No murmur heard.    No friction rub. No gallop.   Pulmonary:      Effort: Pulmonary effort is normal.      Breath sounds: Normal breath sounds.   Abdominal:      General: Bowel sounds are normal.      Palpations: Abdomen is soft.      Tenderness: There is no abdominal tenderness.   Musculoskeletal:         General: Swelling,  tenderness and deformity present.      Cervical back: Neck supple.      Right lower leg: Edema present.      Comments: Right knee dressing clean and dry.        Skin:     General: Skin is warm.      Findings: Erythema and rash present.      Comments: Petechia on the RLE.  Discoloration on both lower extremities   Neurological:      Mental Status: He is alert and oriented to person, place, and time.   Psychiatric:         Mood and Affect: Mood normal. Mood is not anxious or depressed.         Speech: Speech normal.       Significant Labs: ABGs: No results for input(s): PH, PCO2, HCO3, POCSATURATED, BE, TOTALHB, COHB, METHB, O2HB, POCFIO2, PO2 in the last 48 hours.  Blood Culture:   No results for input(s): LABBLOO in the last 48 hours.    BMP:   Recent Labs   Lab 03/11/22  0558   *      K 3.9      CO2 25   BUN 19   CREATININE 0.8   CALCIUM 8.1*       CBC:   Recent Labs   Lab 03/10/22  0521 03/11/22  0558   WBC 2.05* 3.22*   HGB 9.2* 9.5*   HCT 28.5* 29.9*   PLT 86* 115*       CMP:   Recent Labs   Lab 03/10/22  0521 03/11/22  0558    140   K 4.8 3.9    110   CO2 24 25   * 126*   BUN 22* 19   CREATININE 0.8 0.8   CALCIUM 8.2* 8.1*   ANIONGAP 6* 5*   EGFRNONAA >60 >60       Lipase: No results for input(s): LIPASE in the last 48 hours.  Lipid Panel: No results for input(s): CHOL, HDL, LDLCALC, TRIG, CHOLHDL in the last 48 hours.  Prealbumin: No results for input(s): PREALBUMIN in the last 48 hours.  Respiratory Culture: No results for input(s): GSRESP, RESPIRATORYC in the last 48 hours.  TSH: No results for input(s): TSH in the last 4320 hours.  Urine Culture: No results for input(s): LABURIN in the last 48 hours.  Urine Studies:   Recent Labs   Lab 03/09/22  1435   COLORU Yellow   APPEARANCEUA Clear   PHUR 6.0   SPECGRAV >1.030*   PROTEINUA Negative   GLUCUA Negative   KETONESU Negative   BILIRUBINUA Negative   OCCULTUA Negative   NITRITE Negative   UROBILINOGEN Negative    LEUKOCYTESUR Negative         Significant Imaging: I have reviewed all pertinent imaging results/findings within the past 24 hours.

## 2022-03-11 NOTE — NURSING
Pt wanting to leave AMA. AMA papers given to pt. Pt verbalizes understanding. IV D/c'd to RFA. Pressure dressing applied. Tele monitor discontinued. Pt rolled out to private car per wheelchair.

## 2022-03-12 ENCOUNTER — HOSPITAL ENCOUNTER (INPATIENT)
Facility: HOSPITAL | Age: 62
LOS: 2 days | Discharge: HOME | DRG: 549 | End: 2022-03-14
Attending: EMERGENCY MEDICINE | Admitting: INTERNAL MEDICINE
Payer: MEDICAID

## 2022-03-12 DIAGNOSIS — M70.41 PREPATELLAR BURSITIS OF RIGHT KNEE: ICD-10-CM

## 2022-03-12 DIAGNOSIS — R07.9 CHEST PAIN: ICD-10-CM

## 2022-03-12 DIAGNOSIS — M00.9 PYOGENIC ARTHRITIS OF RIGHT KNEE JOINT: ICD-10-CM

## 2022-03-12 LAB
ALBUMIN SERPL BCP-MCNC: 2.6 G/DL (ref 3.5–5.2)
ALP SERPL-CCNC: 90 U/L (ref 55–135)
ALT SERPL W/O P-5'-P-CCNC: 25 U/L (ref 10–44)
ANION GAP SERPL CALC-SCNC: 7 MMOL/L (ref 8–16)
AST SERPL-CCNC: 21 U/L (ref 10–40)
BACTERIA SPEC AEROBE CULT: ABNORMAL
BASOPHILS # BLD AUTO: 0.01 K/UL (ref 0–0.2)
BASOPHILS NFR BLD: 0.4 % (ref 0–1.9)
BILIRUB SERPL-MCNC: 0.7 MG/DL (ref 0.1–1)
BUN SERPL-MCNC: 11 MG/DL (ref 6–20)
CALCIUM SERPL-MCNC: 8.3 MG/DL (ref 8.7–10.5)
CHLORIDE SERPL-SCNC: 105 MMOL/L (ref 95–110)
CO2 SERPL-SCNC: 26 MMOL/L (ref 23–29)
CREAT SERPL-MCNC: 0.8 MG/DL (ref 0.5–1.4)
DIFFERENTIAL METHOD: ABNORMAL
EOSINOPHIL # BLD AUTO: 0.1 K/UL (ref 0–0.5)
EOSINOPHIL NFR BLD: 2.4 % (ref 0–8)
ERYTHROCYTE [DISTWIDTH] IN BLOOD BY AUTOMATED COUNT: 15.6 % (ref 11.5–14.5)
EST. GFR  (AFRICAN AMERICAN): >60 ML/MIN/1.73 M^2
EST. GFR  (NON AFRICAN AMERICAN): >60 ML/MIN/1.73 M^2
GLUCOSE SERPL-MCNC: 114 MG/DL (ref 70–110)
HCT VFR BLD AUTO: 28.7 % (ref 40–54)
HGB BLD-MCNC: 9.4 G/DL (ref 14–18)
IMM GRANULOCYTES # BLD AUTO: 0.01 K/UL (ref 0–0.04)
IMM GRANULOCYTES NFR BLD AUTO: 0.4 % (ref 0–0.5)
LYMPHOCYTES # BLD AUTO: 0.5 K/UL (ref 1–4.8)
LYMPHOCYTES NFR BLD: 19.2 % (ref 18–48)
MCH RBC QN AUTO: 30.2 PG (ref 27–31)
MCHC RBC AUTO-ENTMCNC: 32.8 G/DL (ref 32–36)
MCV RBC AUTO: 92 FL (ref 82–98)
MONOCYTES # BLD AUTO: 0.4 K/UL (ref 0.3–1)
MONOCYTES NFR BLD: 14.1 % (ref 4–15)
NEUTROPHILS # BLD AUTO: 1.6 K/UL (ref 1.8–7.7)
NEUTROPHILS NFR BLD: 63.5 % (ref 38–73)
NRBC BLD-RTO: 0 /100 WBC
PLATELET # BLD AUTO: 107 K/UL (ref 150–450)
PMV BLD AUTO: 9.3 FL (ref 9.2–12.9)
POCT GLUCOSE: 142 MG/DL (ref 70–110)
POTASSIUM SERPL-SCNC: 3.5 MMOL/L (ref 3.5–5.1)
PROT SERPL-MCNC: 6.5 G/DL (ref 6–8.4)
RBC # BLD AUTO: 3.11 M/UL (ref 4.6–6.2)
SODIUM SERPL-SCNC: 138 MMOL/L (ref 136–145)
WBC # BLD AUTO: 2.55 K/UL (ref 3.9–12.7)

## 2022-03-12 PROCEDURE — 63600175 PHARM REV CODE 636 W HCPCS: Performed by: NURSE PRACTITIONER

## 2022-03-12 PROCEDURE — 94760 N-INVAS EAR/PLS OXIMETRY 1: CPT

## 2022-03-12 PROCEDURE — 25000003 PHARM REV CODE 250: Performed by: INTERNAL MEDICINE

## 2022-03-12 PROCEDURE — 85025 COMPLETE CBC W/AUTO DIFF WBC: CPT | Performed by: EMERGENCY MEDICINE

## 2022-03-12 PROCEDURE — 80053 COMPREHEN METABOLIC PANEL: CPT | Performed by: EMERGENCY MEDICINE

## 2022-03-12 PROCEDURE — 25000003 PHARM REV CODE 250: Performed by: NURSE PRACTITIONER

## 2022-03-12 PROCEDURE — 99900035 HC TECH TIME PER 15 MIN (STAT)

## 2022-03-12 PROCEDURE — A4216 STERILE WATER/SALINE, 10 ML: HCPCS | Performed by: NURSE PRACTITIONER

## 2022-03-12 PROCEDURE — 99285 EMERGENCY DEPT VISIT HI MDM: CPT

## 2022-03-12 PROCEDURE — 94761 N-INVAS EAR/PLS OXIMETRY MLT: CPT

## 2022-03-12 PROCEDURE — 11000001 HC ACUTE MED/SURG PRIVATE ROOM

## 2022-03-12 RX ORDER — ACETAMINOPHEN 325 MG/1
650 TABLET ORAL EVERY 4 HOURS PRN
Status: DISCONTINUED | OUTPATIENT
Start: 2022-03-12 | End: 2022-03-14 | Stop reason: HOSPADM

## 2022-03-12 RX ORDER — IBUPROFEN 200 MG
24 TABLET ORAL
Status: DISCONTINUED | OUTPATIENT
Start: 2022-03-12 | End: 2022-03-14 | Stop reason: HOSPADM

## 2022-03-12 RX ORDER — SODIUM CHLORIDE 0.9 % (FLUSH) 0.9 %
10 SYRINGE (ML) INJECTION EVERY 8 HOURS
Status: DISCONTINUED | OUTPATIENT
Start: 2022-03-12 | End: 2022-03-13

## 2022-03-12 RX ORDER — LANOLIN ALCOHOL/MO/W.PET/CERES
1 CREAM (GRAM) TOPICAL DAILY
Status: DISCONTINUED | OUTPATIENT
Start: 2022-03-12 | End: 2022-03-14 | Stop reason: HOSPADM

## 2022-03-12 RX ORDER — SODIUM CHLORIDE 9 MG/ML
INJECTION, SOLUTION INTRAVENOUS
Status: DISCONTINUED | OUTPATIENT
Start: 2022-03-12 | End: 2022-03-14 | Stop reason: HOSPADM

## 2022-03-12 RX ORDER — CEFAZOLIN SODIUM 2 G/50ML
2 SOLUTION INTRAVENOUS
Status: DISCONTINUED | OUTPATIENT
Start: 2022-03-12 | End: 2022-03-14 | Stop reason: HOSPADM

## 2022-03-12 RX ORDER — AMOXICILLIN 250 MG
1 CAPSULE ORAL 2 TIMES DAILY
Status: DISCONTINUED | OUTPATIENT
Start: 2022-03-12 | End: 2022-03-14 | Stop reason: HOSPADM

## 2022-03-12 RX ORDER — SIMETHICONE 80 MG
1 TABLET,CHEWABLE ORAL 4 TIMES DAILY PRN
Status: DISCONTINUED | OUTPATIENT
Start: 2022-03-12 | End: 2022-03-14 | Stop reason: HOSPADM

## 2022-03-12 RX ORDER — MAG HYDROX/ALUMINUM HYD/SIMETH 200-200-20
30 SUSPENSION, ORAL (FINAL DOSE FORM) ORAL 4 TIMES DAILY PRN
Status: DISCONTINUED | OUTPATIENT
Start: 2022-03-12 | End: 2022-03-14 | Stop reason: HOSPADM

## 2022-03-12 RX ORDER — ONDANSETRON 2 MG/ML
4 INJECTION INTRAMUSCULAR; INTRAVENOUS EVERY 8 HOURS PRN
Status: DISCONTINUED | OUTPATIENT
Start: 2022-03-12 | End: 2022-03-14 | Stop reason: HOSPADM

## 2022-03-12 RX ORDER — ENOXAPARIN SODIUM 100 MG/ML
40 INJECTION SUBCUTANEOUS EVERY 24 HOURS
Status: DISCONTINUED | OUTPATIENT
Start: 2022-03-12 | End: 2022-03-14 | Stop reason: HOSPADM

## 2022-03-12 RX ORDER — TALC
6 POWDER (GRAM) TOPICAL NIGHTLY PRN
Status: DISCONTINUED | OUTPATIENT
Start: 2022-03-12 | End: 2022-03-14 | Stop reason: HOSPADM

## 2022-03-12 RX ORDER — IBUPROFEN 200 MG
16 TABLET ORAL
Status: DISCONTINUED | OUTPATIENT
Start: 2022-03-12 | End: 2022-03-14 | Stop reason: HOSPADM

## 2022-03-12 RX ORDER — GLUCAGON 1 MG
1 KIT INJECTION
Status: DISCONTINUED | OUTPATIENT
Start: 2022-03-12 | End: 2022-03-14 | Stop reason: HOSPADM

## 2022-03-12 RX ORDER — INSULIN ASPART 100 [IU]/ML
0-5 INJECTION, SOLUTION INTRAVENOUS; SUBCUTANEOUS
Status: DISCONTINUED | OUTPATIENT
Start: 2022-03-12 | End: 2022-03-14 | Stop reason: HOSPADM

## 2022-03-12 RX ORDER — ONDANSETRON 8 MG/1
8 TABLET, ORALLY DISINTEGRATING ORAL EVERY 8 HOURS PRN
Status: DISCONTINUED | OUTPATIENT
Start: 2022-03-12 | End: 2022-03-14 | Stop reason: HOSPADM

## 2022-03-12 RX ORDER — IPRATROPIUM BROMIDE AND ALBUTEROL SULFATE 2.5; .5 MG/3ML; MG/3ML
3 SOLUTION RESPIRATORY (INHALATION) EVERY 4 HOURS PRN
Status: DISCONTINUED | OUTPATIENT
Start: 2022-03-12 | End: 2022-03-14 | Stop reason: HOSPADM

## 2022-03-12 RX ADMIN — BICTEGRAVIR SODIUM, EMTRICITABINE, AND TENOFOVIR ALAFENAMIDE FUMARATE 1 TABLET: 50; 200; 25 TABLET ORAL at 04:03

## 2022-03-12 RX ADMIN — FERROUS SULFATE TAB 325 MG (65 MG ELEMENTAL FE) 1 EACH: 325 (65 FE) TAB at 04:03

## 2022-03-12 RX ADMIN — CEFAZOLIN SODIUM 2 G: 2 SOLUTION INTRAVENOUS at 04:03

## 2022-03-12 RX ADMIN — CEFAZOLIN SODIUM 2 G: 2 SOLUTION INTRAVENOUS at 11:03

## 2022-03-12 RX ADMIN — Medication 10 ML: at 08:03

## 2022-03-12 RX ADMIN — ENOXAPARIN SODIUM 40 MG: 100 INJECTION SUBCUTANEOUS at 06:03

## 2022-03-12 RX ADMIN — SODIUM CHLORIDE: 0.9 INJECTION, SOLUTION INTRAVENOUS at 04:03

## 2022-03-12 RX ADMIN — ACETAMINOPHEN 650 MG: 325 TABLET, FILM COATED ORAL at 06:03
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
## 2022-03-12 NOTE — ASSESSMENT & PLAN NOTE
Cellulitis    Was recently admitted for the above. Patient left AMA 2/2 work obligations. ID was consulted on his last admission and preferred IV abx, but the patient left AMA. He was on Vancomycin and Zosyn. Wound cultures collected on 3/9 resulted with Staphylococcus aureus---sensitivities noted. Ortho was consulted on his last admit--s/p excision and debridement including bursectomy right knee on 3/9 per Dr. Hernandez.    --Will start Cefazolin this admission   --Consulting ID  --Blood cultures with NGTD

## 2022-03-12 NOTE — HPI
Daniel Good is a 62 y/o male who presents to the emergency department with right knee swelling and pain.  The patient states he had a prepatellar bursal infection to his right knee.  He states Dr. Hernandez, orthopedic surgeon,  did surgery on his knee on 03/09/2022.  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  He complains of persistent increased drainage to the knee. Wound cultures resulted 3/12-- noted with MSSA. Sensitivities noted, will start Cefazolin and will consult ID.

## 2022-03-12 NOTE — CONSULTS
Patient very well known by ID although never seen by this attending since he went AMA on 3-11-22 - back now with infected right knee with MSSA and will need 6 weeks of IV abx - agree with cefazolin     Full note to follow

## 2022-03-12 NOTE — PROGRESS NOTES
[facility]           Pharmacy        Current Drug Shortage     Due to national backorder and [facility] is critically low on inventory of Dextrose 50% (D50) Syringes and Vials, pharmacy has automatically switched from D50% to D10% IVPB at the equivalent dose until resolution of the shortage per P&T approved protocol.               Kirk Luna, PharmD  [phone]
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
## 2022-03-12 NOTE — PHARMACY MED REC
"Admission Medication History     The home medication history was taken by Rian Bartlett PharmD.    Medication history obtained from patient    You may go to "Admission" then "Reconcile Home Medications" tabs to review and/or act upon these items.      The home medication list has been updated by the Pharmacy department.    Please read ALL comments highlighted in yellow.    Please address this information as you see fit.     Feel free to contact us if you have any questions or require assistance.      The medications listed below were removed from the home medication list.  Please reorder if appropriate:   Patient reports NOT TAKING the following medication(s):  o Ferrous sulfate 325mg    o Patient reports he/she IS TAKING the following which was not ordered upon admit  o nadalol 40mg      Potential issues to be addressed PRIOR TO DISCHARGE   Drug cost interfering with therapy (provide alternatives if possible)   Patient request for refills   Prescription could not be filled prior to admission   Patient lacks understanding of therapy      Rian Bartlett PharmD.  Ext [phone]        .          "

## 2022-03-12 NOTE — ASSESSMENT & PLAN NOTE
-- U/S of the right lower ext  from 3/9--No evidence of deep venous thrombosis in the right lower extremity

## 2022-03-12 NOTE — PROGRESS NOTES
VN cued into room to complete admit assessment. VIP model introduced; VN working alongside bedside treatment team.  Plan of care reviewed with patient. Patient informed of fall risk, fall precautions, call light within reach, side rails x2 elevated. Patient notified to ask staff for assistance. Patient verbalized complete understanding. Time allowed for questions. Will continue to monitor and intervene as needed.          03/12/22 1659   Admission   Initial VN Admission Questions Complete   Communication Issues? None   Shift   Virtual Nurse - Patient Verbalized Approval Of Camera Use   Safety/Activity   Patient Rounds bed in low position;call light in patient/parent reach;clutter free environment maintained;visualized patient   Safety Promotion/Fall Prevention side rails raised x 2   Positioning   Body Position supine   Head of Bed (HOB) Positioning HOB elevated
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
## 2022-03-13 LAB
ANION GAP SERPL CALC-SCNC: 6 MMOL/L (ref 8–16)
BUN SERPL-MCNC: 10 MG/DL (ref 6–20)
CALCIUM SERPL-MCNC: 8.6 MG/DL (ref 8.7–10.5)
CHLORIDE SERPL-SCNC: 103 MMOL/L (ref 95–110)
CO2 SERPL-SCNC: 26 MMOL/L (ref 23–29)
CREAT SERPL-MCNC: 0.7 MG/DL (ref 0.5–1.4)
EST. GFR  (AFRICAN AMERICAN): >60 ML/MIN/1.73 M^2
EST. GFR  (NON AFRICAN AMERICAN): >60 ML/MIN/1.73 M^2
GLUCOSE SERPL-MCNC: 108 MG/DL (ref 70–110)
MAGNESIUM SERPL-MCNC: 1.7 MG/DL (ref 1.6–2.6)
POTASSIUM SERPL-SCNC: 4.2 MMOL/L (ref 3.5–5.1)
SODIUM SERPL-SCNC: 135 MMOL/L (ref 136–145)

## 2022-03-13 PROCEDURE — 21400001 HC TELEMETRY ROOM

## 2022-03-13 PROCEDURE — 36415 COLL VENOUS BLD VENIPUNCTURE: CPT | Performed by: NURSE PRACTITIONER

## 2022-03-13 PROCEDURE — 83735 ASSAY OF MAGNESIUM: CPT | Performed by: NURSE PRACTITIONER

## 2022-03-13 PROCEDURE — 80048 BASIC METABOLIC PNL TOTAL CA: CPT | Performed by: NURSE PRACTITIONER

## 2022-03-13 PROCEDURE — 99900035 HC TECH TIME PER 15 MIN (STAT)

## 2022-03-13 PROCEDURE — 94761 N-INVAS EAR/PLS OXIMETRY MLT: CPT

## 2022-03-13 PROCEDURE — A4216 STERILE WATER/SALINE, 10 ML: HCPCS | Performed by: NURSE PRACTITIONER

## 2022-03-13 PROCEDURE — 63600175 PHARM REV CODE 636 W HCPCS: Performed by: NURSE PRACTITIONER

## 2022-03-13 PROCEDURE — 25000003 PHARM REV CODE 250: Performed by: INTERNAL MEDICINE

## 2022-03-13 PROCEDURE — 25000003 PHARM REV CODE 250: Performed by: NURSE PRACTITIONER

## 2022-03-13 RX ADMIN — ENOXAPARIN SODIUM 40 MG: 100 INJECTION SUBCUTANEOUS at 05:03

## 2022-03-13 RX ADMIN — CEFAZOLIN SODIUM 2 G: 2 SOLUTION INTRAVENOUS at 10:03

## 2022-03-13 RX ADMIN — DOCUSATE SODIUM AND SENNOSIDES 1 TABLET: 8.6; 5 TABLET, FILM COATED ORAL at 08:03

## 2022-03-13 RX ADMIN — ACETAMINOPHEN 650 MG: 325 TABLET, FILM COATED ORAL at 08:03

## 2022-03-13 RX ADMIN — CEFAZOLIN SODIUM 2 G: 2 SOLUTION INTRAVENOUS at 03:03

## 2022-03-13 RX ADMIN — CEFAZOLIN SODIUM 2 G: 2 SOLUTION INTRAVENOUS at 08:03

## 2022-03-13 RX ADMIN — FERROUS SULFATE TAB 325 MG (65 MG ELEMENTAL FE) 1 EACH: 325 (65 FE) TAB at 08:03

## 2022-03-13 RX ADMIN — Medication 10 ML: at 05:03

## 2022-03-13 RX ADMIN — BICTEGRAVIR SODIUM, EMTRICITABINE, AND TENOFOVIR ALAFENAMIDE FUMARATE 1 TABLET: 50; 200; 25 TABLET ORAL at 05:03
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
## 2022-03-13 NOTE — ASSESSMENT & PLAN NOTE
Prepatellar bursitis of right knee  Cellulitis    Was recently admitted for the above. Patient left AMA 2/2 work obligations. ID was consulted on his last admission and preferred IV abx, but the patient left AMA. He was on Vancomycin and Zosyn. Wound cultures collected on 3/9 resulted with Staphylococcus aureus---sensitivities noted. Ortho was consulted on his last admit--s/p excision and debridement including bursectomy right knee on 3/9 per Dr. Hernandez.    --Will start Cefazolin this admission   --Appreciate ID  --Blood cultures with NGTD  --Wound cultures with MSSA

## 2022-03-13 NOTE — PHYSICIAN QUERY
PT Name: Daniel Good  MR #: [id number]    DOCUMENTATION CLARIFICATION     CDS/: JEFF Lamb, RN               Contact information:[email or web address]    This form is a permanent document in the medical record.     Query Date: March 12, 2022    By submitting this query, we are merely seeking further clarification of documentation. Please utilize your independent clinical judgment when addressing the question(s) below.    Supporting Clinical Findings Location in Medical Record   Principal Problem:Pyogenic arthritis of right knee joint  Pyogenic arthritis of right knee joint  Cellulitis   Right knee xray: Prepatellar and infrapatellar soft tissue edema.  Soft tissue ultrasound reports Complex fluid collection in the prepatellar space, in the region of the swelling seen on clinical exam. This could be secondary to cellulitis and septic bursitis versus a hematoma.  Vanc and zosyn  Blood cx  Consult ortho: s/p excision and debridement including bursectomy right knee pn 3/9  Continue vanc/Zosyn pending culture  Consult infection disease  Holding DVT prophylaxis for possible I&D soon- resume DVT prophylaxis    Hospital Medicine Progress  Notes 03/10/2022   Wound culture Staphylococcus aureus  susceptibility pending    S/p excisional debridement and bursectomy of R knee joint on 03/09 with large amount of purulent fluid drained.    - Blood culture NGTD  - wound cultures with staph aureus, susceptibility pending  Very important for patient to have 6 weeks of IV antibiotics.  Hospital Medicine Progress  Notes 03/10/2022    Infectious Disease Progress Notes 03/11/2022       Provider, please clarify if there is any clinical correlation between _Staphylococcus aureus__ and _Pyogenic arthritis of right knee joint__.           Are the conditions:      [ x ] Due to or associated with each other   [  ] Unrelated to each other   [  ] Other explanation (Please Specify): ______________   [  ] Clinically Undetermined                                                                                Please document in your progress notes daily for the duration of treatment until resolved and include in your discharge summary.

## 2022-03-13 NOTE — PROGRESS NOTES
[place] - [facility] Surg  Infectious Disease  Progress Note    Patient Name: Daniel Good  MRN: [id number]  Admission Date: 3/12/2022  Length of Stay: 1 days  Attending Physician: Toby Gomez MD  Primary Care Provider: Autumn Marti MD    Isolation Status: No active isolations  Assessment/Plan:      * Pyogenic arthritis of right knee joint  Daniel Good is a 60 y/o M with PMH of  DVT (deep venous thrombosis), Hepatitis C, and HIV infection, resent to the emergency room with 2 weeks history of progressive right knee pain. Patient reports that about 2 weeks [place] that were very dirty. He notes that a couple of days later his knee was swollen and red and painful. He went to urgent care who thought he might have gout or an infection so he was discharged with steroids and 10 days of augmentin that he completed. He reports that the pain and swelling persisted and he hit his knee on a door on the day of admission and it started draining a little bit. He denies fevers, chills, abdominal pain, chest pain, SOB, nausea, vomiting, diarrhea.     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 Keep on cefazolin IV for now since his bursa was removed - much better today - plan for 1 more day of IV and then can change to PO keflex 500 qid for another 10 days or so - will evaluate tomorrow to see if this can happen         Anticipated Disposition:     Thank you for your consult. I will follow-up with patient. Please contact us if you have any additional questions.    Daniel Bourgeois MD  Infectious Disease  [place] - Med Surg    Subjective:     Principal Problem:Pyogenic arthritis of right knee joint    HPI: Daniel Good is a 62 y/o M with PMH of  DVT (deep venous thrombosis), Hepatitis C, and HIV infection, resent to the emergency room with 2 weeks history of progressive right knee pain. Patient reports that about 2 weeks [place] that were very dirty. He notes that a couple of days later his knee was swollen and red and painful. He went to urgent care who thought he might have gout or an infection so he was discharged with steroids and 10 days of augmentin that he completed. He reports that the pain and swelling persisted and he hit his knee on a door on the day of admission and it started draining a little bit. He denies fevers, chills, abdominal pain, chest pain, SOB, nausea, vomiting, diarrhea.     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 Negative for chills, fatigue and fever.   HENT: Negative.     Respiratory: Negative.     Gastrointestinal: Negative.    Endocrine: Negative.    Musculoskeletal:  Positive for joint swelling.   Skin:  Positive for rash and wound.   Neurological: Negative.    Hematological: Negative.    Psychiatric/Behavioral: Negative.     Objective:     Vital Signs (Most Recent):  Temp: 98.4 °F (36.9 °C) (03/13/22 1539)  Pulse: 81 (03/13/22 1557)  Resp: 18 (03/13/22 1539)  BP: (!) 140/83 (03/13/22 1539)  SpO2: 99 % (03/13/22 1539)   Vital Signs (24h Range):  Temp:  [97.5 °F (36.4 °C)-98.5 °F (36.9 °C)] 98.4 °F (36.9 °C)  Pulse:  [71-85] 81  Resp:  [18] 18  SpO2:  [98 %-100 %] 99 %  BP: (136-163)/(83-99) 140/83     Weight: 75.1 kg (165 lb 9.1 oz)  Body mass index is 23.76 kg/m².    Estimated Creatinine Clearance: 117.3 mL/min (based on SCr of 0.7 mg/dL).    Physical Exam  Constitutional:       Appearance: Normal appearance.   Eyes:      Extraocular Movements: Extraocular movements intact.   Cardiovascular:      Rate and Rhythm: Normal rate and regular rhythm.   Pulmonary:      Effort: Pulmonary effort is normal.      Breath sounds: Normal breath sounds.   Abdominal:      General: Abdomen is flat.      Palpations: Abdomen is soft.   Musculoskeletal:         General: Swelling present.      Cervical back: Normal range of motion and neck supple.   Skin:     Findings: Rash present.      Comments: Right knee with warmth, stitches in placed and erythema - some pain on palpation - no fluid noted  - less erythema today    Neurological:      Mental Status: He is alert.   Significant Labs: All pertinent labs within the past 24 hours have been  reviewed.    Significant Imaging: I have reviewed all pertinent imaging results/findings within the past 24 hours.

## 2022-03-13 NOTE — NURSING
Cleaned wound w/ NS. Wrapped w/ 4x4 and gauze. No c/o of pain to knee. C/o HA treated w/ prn tylenol.

## 2022-03-13 NOTE — ASSESSMENT & PLAN NOTE
Daniel Good is a 60 y/o M with PMH of  DVT (deep venous thrombosis), Hepatitis C, and HIV infection, resent to the emergency room with 2 weeks history of progressive right knee pain. Patient reports that about 2 weeks [place] that were very dirty. He notes that a couple of days later his knee was swollen and red and painful. He went to urgent care who thought he might have gout or an infection so he was discharged with steroids and 10 days of augmentin that he completed. He reports that the pain and swelling persisted and he hit his knee on a door on the day of admission and it started draining a little bit. He denies fevers, chills, abdominal pain, chest pain, SOB, nausea, vomiting, diarrhea.     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 Keep on cefazolin IV for now since his bursa was removed - much better today - plan for 1 more day of IV and then can change to PO keflex 500 qid for another 10 days or so - will evaluate tomorrow to see if this can happen

## 2022-03-13 NOTE — SUBJECTIVE & OBJECTIVE
Interval History: Seen at the bedside, no distress noted. Cont IV abx. Appreciate IDs recs.    Review of Systems   Constitutional:  Negative for chills and fever.   HENT:  Negative for trouble swallowing.    Gastrointestinal:  Negative for nausea and vomiting.   Genitourinary:  Negative for difficulty urinating.   Musculoskeletal:  Positive for arthralgias, gait problem, joint swelling (RLE inproving) and myalgias.   Skin:  Positive for wound (right knee).   Hematological:  Does not bruise/bleed easily.   Psychiatric/Behavioral:  Negative for confusion.    Objective:     Vital Signs (Most Recent):  Temp: 97.6 °F (36.4 °C) (03/13/22 0721)  Pulse: 75 (03/13/22 0859)  Resp: 18 (03/13/22 0721)  BP: 136/87 (03/13/22 0721)  SpO2: 99 % (03/13/22 0734) Vital Signs (24h Range):  Temp:  [97.5 °F (36.4 °C)-98.5 °F (36.9 °C)] 97.6 °F (36.4 °C)  Pulse:  [71-92] 75  Resp:  [14-18] 18  SpO2:  [96 %-100 %] 99 %  BP: (136-163)/(81-99) 136/87     Weight: 75.1 kg (165 lb 9.1 oz)  Body mass index is 23.76 kg/m².    Intake/Output Summary (Last 24 hours) at 3/13/2022 1010  Last data filed at 3/13/2022 0606  Gross per 24 hour   Intake 464.32 ml   Output 2000 ml   Net -1535.68 ml      Physical Exam  Vitals and nursing note reviewed.   Constitutional:       Appearance: Normal appearance.   HENT:      Head: Normocephalic and atraumatic.      Nose: Nose normal.      Mouth/Throat:      Mouth: Mucous membranes are moist.   Eyes:      Extraocular Movements: Extraocular movements intact.   Cardiovascular:      Rate and Rhythm: Normal rate.   Pulmonary:      Effort: Pulmonary effort is normal.      Breath sounds: Normal breath sounds.   Abdominal:      General: Bowel sounds are normal.      Palpations: Abdomen is soft.   Musculoskeletal:         General: Swelling and tenderness present.      Cervical back: Normal range of motion.      Right lower leg: Edema present.      Comments: Redness noted to the RLE but improving. Sutures noted. Swelling has  improved    Skin:     General: Skin is warm.      Capillary Refill: Capillary refill takes 2 to 3 seconds.      Findings: Erythema (right lower ext) present.   Neurological:      Mental Status: He is alert and oriented to person, place, and time.   Psychiatric:         Mood and Affect: Mood normal.         Behavior: Behavior normal.       Significant Labs: All pertinent labs within the past 24 hours have been reviewed.    Significant Imaging: I have reviewed all pertinent imaging results/findings within the past 24 hours.

## 2022-03-13 NOTE — PLAN OF CARE
Patient A&Ox4. Dressing on right knee clean and intact. On IV antibiotics. Prn pain medicine given. Call light within reach. Bed alarm on.

## 2022-03-13 NOTE — PLAN OF CARE
AAO,meds admin per mar,RLE edema,knee dressing saturated with serosang drng,changed,poc review,bed alarm set.

## 2022-03-13 NOTE — SUBJECTIVE & OBJECTIVE
Interval History: Doing well today - no fevers or chills - he feels that the redness I much better     Review of Systems  Constitutional:  Positive for activity change. Negative for chills, fatigue and fever.   HENT: Negative.     Respiratory: Negative.     Gastrointestinal: Negative.    Endocrine: Negative.    Musculoskeletal:  Positive for joint swelling.   Skin:  Positive for rash and wound.   Neurological: Negative.    Hematological: Negative.    Psychiatric/Behavioral: Negative.     Objective:     Vital Signs (Most Recent):  Temp: 98.4 °F (36.9 °C) (03/13/22 1539)  Pulse: 81 (03/13/22 1557)  Resp: 18 (03/13/22 1539)  BP: (!) 140/83 (03/13/22 1539)  SpO2: 99 % (03/13/22 1539)   Vital Signs (24h Range):  Temp:  [97.5 °F (36.4 °C)-98.5 °F (36.9 °C)] 98.4 °F (36.9 °C)  Pulse:  [71-85] 81  Resp:  [18] 18  SpO2:  [98 %-100 %] 99 %  BP: (136-163)/(83-99) 140/83     Weight: 75.1 kg (165 lb 9.1 oz)  Body mass index is 23.76 kg/m².    Estimated Creatinine Clearance: 117.3 mL/min (based on SCr of 0.7 mg/dL).    Physical Exam  Constitutional:       Appearance: Normal appearance.   Eyes:      Extraocular Movements: Extraocular movements intact.   Cardiovascular:      Rate and Rhythm: Normal rate and regular rhythm.   Pulmonary:      Effort: Pulmonary effort is normal.      Breath sounds: Normal breath sounds.   Abdominal:      General: Abdomen is flat.      Palpations: Abdomen is soft.   Musculoskeletal:         General: Swelling present.      Cervical back: Normal range of motion and neck supple.   Skin:     Findings: Rash present.      Comments: Right knee with warmth, stitches in placed and erythema - some pain on palpation - no fluid noted  - less erythema today    Neurological:      Mental Status: He is alert.   Significant Labs: All pertinent labs within the past 24 hours have been reviewed.    Significant Imaging: I have reviewed all pertinent imaging results/findings within the past 24 hours.

## 2022-03-13 NOTE — PROGRESS NOTES
[facility] Medicine  Progress Note    Patient Name: Daniel Good  MRN: [id number]  Patient Class: IP- Inpatient   Admission Date: 3/12/2022  Length of Stay: 1 days  Attending Physician: Toby Gomez MD  Primary Care Provider: Autumn Marti MD        Subjective:     Principal Problem:Pyogenic arthritis of right knee joint        HPI:  Daniel Good is a 62 y/o male who presents to the emergency department with right knee swelling and pain.  The patient states he had a prepatellar bursal infection to his right knee.  He states Dr. Hernandez, orthopedic surgeon,  did surgery on his knee on 03/09/2022.  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  He complains of persistent increased drainage to the knee. Wound cultures resulted 3/12-- noted with MSSA. Sensitivities noted, will start Cefazolin and will consult ID.       Overview/Hospital Course:  The patient was admitted for care under the  service. In the ED, hid right knee was drained and noted with serosanguinous drainage. He left the day before AMA and returned to the hospital for care. Wound cultures noted--Cultures grew MSSA - patient started on cefazolin IV. ID was consulted.       Interval History: Seen at the bedside, no distress noted. Cont IV abx. Appreciate IDs recs.    Review of Systems   Constitutional:  Negative for chills and fever.   HENT:  Negative for trouble swallowing.    Gastrointestinal:  Negative for nausea and vomiting.   Genitourinary:  Negative for difficulty urinating.   Musculoskeletal:  Positive for arthralgias, gait problem, joint swelling (RLE inproving) and myalgias.   Skin:  Positive for wound (right knee).   Hematological:  Does not bruise/bleed easily.   Psychiatric/Behavioral:  Negative for confusion.    Objective:     Vital Signs (Most Recent):  Temp: 97.6 °F (36.4 °C) (03/13/22 0721)  Pulse: 75 (03/13/22 0859)  Resp: 18  (03/13/22 0721)  BP: 136/87 (03/13/22 0721)  SpO2: 99 % (03/13/22 0734) Vital Signs (24h Range):  Temp:  [97.5 °F (36.4 °C)-98.5 °F (36.9 °C)] 97.6 °F (36.4 °C)  Pulse:  [71-92] 75  Resp:  [14-18] 18  SpO2:  [96 %-100 %] 99 %  BP: (136-163)/(81-99) 136/87     Weight: 75.1 kg (165 lb 9.1 oz)  Body mass index is 23.76 kg/m².    Intake/Output Summary (Last 24 hours) at 3/13/2022 1010  Last data filed at 3/13/2022 0606  Gross per 24 hour   Intake 464.32 ml   Output 2000 ml   Net -1535.68 ml      Physical Exam  Vitals and nursing note reviewed.   Constitutional:       Appearance: Normal appearance.   HENT:      Head: Normocephalic and atraumatic.      Nose: Nose normal.      Mouth/Throat:      Mouth: Mucous membranes are moist.   Eyes:      Extraocular Movements: Extraocular movements intact.   Cardiovascular:      Rate and Rhythm: Normal rate.   Pulmonary:      Effort: Pulmonary effort is normal.      Breath sounds: Normal breath sounds.   Abdominal:      General: Bowel sounds are normal.      Palpations: Abdomen is soft.   Musculoskeletal:         General: Swelling and tenderness present.      Cervical back: Normal range of motion.      Right lower leg: Edema present.      Comments: Redness noted to the RLE but improving. Sutures noted. Swelling has improved    Skin:     General: Skin is warm.      Capillary Refill: Capillary refill takes 2 to 3 seconds.      Findings: Erythema (right lower ext) present.   Neurological:      Mental Status: He is alert and oriented to person, place, and time.   Psychiatric:         Mood and Affect: Mood normal.         Behavior: Behavior normal.       Significant Labs: All pertinent labs within the past 24 hours have been reviewed.    Significant Imaging: I have reviewed all pertinent imaging results/findings within the past 24 hours.      Assessment/Plan:      * Pyogenic arthritis of right knee joint  Prepatellar bursitis of right knee  Cellulitis    Was recently admitted for the  above. Patient left AMA 2/2 work obligations. ID was consulted on his last admission and preferred IV abx, but the patient left AMA. He was on Vancomycin and Zosyn. Wound cultures collected on 3/9 resulted with Staphylococcus aureus---sensitivities noted. Ortho was consulted on his last admit--s/p excision and debridement including bursectomy right knee on 3/9 per Dr. Hernandez.    --Will start Cefazolin this admission   --Appreciate ID  --Blood cultures with NGTD  --Wound cultures with MSSA          Hepatitis C  Chronic.      Iron deficiency anemia  -- Cont Fe tabs      History of  DVT (deep venous thrombosis)  -- U/S of the right lower ext  from 3/9--No evidence of deep venous thrombosis in the right lower extremity    HIV disease  -- On Biktarvy  -- CD4 > 200 on 12/2021  -- Viral load < 50 in 12.2021        VTE Risk Mitigation (From admission, onward)         Ordered     enoxaparin injection 40 mg  Daily         03/12/22 1412     IP VTE HIGH RISK PATIENT  Once         03/12/22 1412     Place sequential compression device  Until discontinued         03/12/22 1412                Discharge Planning   JOSE:      Code Status: Full Code   Is the patient medically ready for discharge?:     Reason for patient still in hospital (select all that apply): Treatment, Imaging and Consult recommendations                     Jeyson Mann NP  Department of Hospital Medicine   [facility] Surg

## 2022-03-13 NOTE — HOSPITAL COURSE
The patient was admitted for care under the  service. In the ED, hid right knee was drained and noted with serosanguinous drainage. He left the day before AMA and returned to the hospital for care. Wound cultures noted--Cultures grew MSSA - patient started on cefazolin IV. ID was consulted.   Wound cultures reviewed; ID recommended Keflex 500 mg PO BID x10 days. Will need follow up with ID and Ortho outpatient.

## 2022-03-14 VITALS
HEIGHT: 70 IN | BODY MASS INDEX: 21.68 KG/M2 | TEMPERATURE: 98 F | DIASTOLIC BLOOD PRESSURE: 86 MMHG | OXYGEN SATURATION: 99 % | WEIGHT: 151.44 LBS | RESPIRATION RATE: 20 BRPM | SYSTOLIC BLOOD PRESSURE: 141 MMHG | HEART RATE: 106 BPM

## 2022-03-14 LAB
ANION GAP SERPL CALC-SCNC: 8 MMOL/L (ref 8–16)
BACTERIA BLD CULT: NORMAL
BACTERIA BLD CULT: NORMAL
BUN SERPL-MCNC: 13 MG/DL (ref 6–20)
CALCIUM SERPL-MCNC: 8.8 MG/DL (ref 8.7–10.5)
CHLORIDE SERPL-SCNC: 104 MMOL/L (ref 95–110)
CO2 SERPL-SCNC: 23 MMOL/L (ref 23–29)
CREAT SERPL-MCNC: 0.8 MG/DL (ref 0.5–1.4)
EST. GFR  (AFRICAN AMERICAN): >60 ML/MIN/1.73 M^2
EST. GFR  (NON AFRICAN AMERICAN): >60 ML/MIN/1.73 M^2
GLUCOSE SERPL-MCNC: 98 MG/DL (ref 70–110)
MAGNESIUM SERPL-MCNC: 1.7 MG/DL (ref 1.6–2.6)
POTASSIUM SERPL-SCNC: 4.2 MMOL/L (ref 3.5–5.1)
SODIUM SERPL-SCNC: 135 MMOL/L (ref 136–145)

## 2022-03-14 PROCEDURE — 25000003 PHARM REV CODE 250: Performed by: NURSE PRACTITIONER

## 2022-03-14 PROCEDURE — 83735 ASSAY OF MAGNESIUM: CPT | Performed by: NURSE PRACTITIONER

## 2022-03-14 PROCEDURE — 99900035 HC TECH TIME PER 15 MIN (STAT)

## 2022-03-14 PROCEDURE — 63600175 PHARM REV CODE 636 W HCPCS: Performed by: NURSE PRACTITIONER

## 2022-03-14 PROCEDURE — 80048 BASIC METABOLIC PNL TOTAL CA: CPT | Performed by: NURSE PRACTITIONER

## 2022-03-14 PROCEDURE — 36415 COLL VENOUS BLD VENIPUNCTURE: CPT | Performed by: NURSE PRACTITIONER

## 2022-03-14 PROCEDURE — 94761 N-INVAS EAR/PLS OXIMETRY MLT: CPT

## 2022-03-14 RX ORDER — CEPHALEXIN 500 MG/1
500 CAPSULE ORAL EVERY 12 HOURS
Qty: 20 CAPSULE | Refills: 0 | Status: SHIPPED | OUTPATIENT
Start: 2022-03-14 | End: 2022-03-24

## 2022-03-14 RX ADMIN — ACETAMINOPHEN 650 MG: 325 TABLET, FILM COATED ORAL at 12:03

## 2022-03-14 RX ADMIN — FERROUS SULFATE TAB 325 MG (65 MG ELEMENTAL FE) 1 EACH: 325 (65 FE) TAB at 09:03

## 2022-03-14 RX ADMIN — CEFAZOLIN SODIUM 2 G: 2 SOLUTION INTRAVENOUS at 09:03

## 2022-03-14 NOTE — PLAN OF CARE
No PRN treatment required at this time .  Will continue to monitor for signs of respiratory distress.

## 2022-03-14 NOTE — PLAN OF CARE
Right knee dressing dry and intact,iv abx admin as ordered,headache relief voiced after tylenol,poc reviewed.

## 2022-03-14 NOTE — PROGRESS NOTES
[facility]                    Pharmacy       Discharge Medication Education    Patient ACCEPTED medication education. Pharmacy has provided education on the name, indication, and possible side effects of the medication(s) prescribed, using teach-back method.     The following medications have also been discussed, during this admission.        Medication List        START taking these medications      cephALEXin 500 MG capsule  Commonly known as: KEFLEX  Take 1 capsule (500 mg total) by mouth every 12 (twelve) hours. for 10 days            CONTINUE taking these medications      BIKTARVY -25 mg (25 kg or greater)  Generic drug: vrcoonrmi-jehzilcm-tqfqiyr ala            ASK your doctor about these medications      multivitamin per tablet  Commonly known as: THERAGRAN     nadoloL 40 MG tablet  Commonly known as: CORGARD               Where to Get Your Medications        These medications were sent to [facility]  [address], [place] [address]      Hours: Mon-Fri, 8a-5:30p Phone: [phone]   cephALEXin 500 MG capsule          Thank you  Rian Bartlett, PharmD  [phone]

## 2022-03-14 NOTE — PLAN OF CARE
Patient afebrile and doing OK - knee still with erythema but less pain and swelling  - less warm as well     OK to change to Po Keflex 500 mg qid for 10 more days at least  Patient will follow up with PCP - Dr. Marti at [facility] within 1 week   I will contact Dr. Marti and the patient will call [facility] for an appt     Ok for discharge as per ID   Thanks for the consult

## 2022-03-14 NOTE — PLAN OF CARE
VN reviewed discharge instructions with pt. Using teach back method.  AVS printed and handed to pt by bedside nurse.  Reviewed follow-up appointments, medications, diet, and importance of medication compliance.  Reviewed home care instructions, treatment plan, self-management, and when to seek medical attention.  Allowed time for questions.  All questions answered.  Patient verbalized complete understanding of discharge instructions and voices no concerns.     Discharge instructions complete.  Bedside delivery done.  Pt waiting on ride. Bedside nurse notified.

## 2022-03-14 NOTE — PLAN OF CARE
03/14/22 1221   Post-Acute Status   Post-Acute Authorization Other   Other Status Awaiting f/u Appts  (TN spoken with Genia with [facility] scheduling regarding 1 week follow up with  with ID at [facility]. Genia to send message to 's scheduling staff regarding one week appointment request. [facility] ID staff to call patient with appointment.)   Discharge Plan   Discharge Plan A Home

## 2022-03-14 NOTE — NURSING
Pt. R knee dressing changed prior to discharge. Pt. Given extra supplies to change dressing, as needed. Pt.  Handed AVS at bedside. Virtual nurse notified. Pt. Given 2 taxi services numbers to obtain ride home. Pt. Verbalized understanding, no questions at this time. Pt. Encouraged to call with questions or concerns.

## 2022-03-14 NOTE — MEDICAL/APP STUDENT
Hospital Medicine Progress Note    Daniel Good  61 y.o. male  Admit Date: 3/12/2022   LOS: 2 days     SUBJECTIVE:     Follow-up For: Pyogenic arthritis of right knee joint    Interval History:  No acute events overnight. Pt is tolerating abx well and is in no acute distress. Wound is healing well. Swelling decreased. ID says pt is clinically well enough for discharge with PO Keflex.     Review of Systems:  CONSTITUTIONAL: Negative for fever or chills.  CARDIOVASCULAR: Negative for chest pain or palpitations.  RESPIRATORY: No shortness of breath. No cough.  GASTROINTESTINAL: Negative for nausea, vomiting, diarrhea.  SKIN: Negative for rashes.   MSK: Pain in R Extremity at surgical site.     OBJECTIVE:     Vital Signs Range (Last 24H):  Temp:  [97.6 °F (36.4 °C)-98.6 °F (37 °C)]   Pulse:  []   Resp:  [18]   BP: (122-157)/(73-95)   SpO2:  [97 %-100 %]     I & O (Last 24H):    Intake/Output Summary (Last 24 hours) at 3/14/2022 1125  Last data filed at 3/14/2022 0942  Gross per 24 hour   Intake 623.54 ml   Output 1000 ml   Net -376.46 ml       Physical Exam:  GENERAL: Alert and oriented x 3, no apparent distress.  HEENT: AT, NC, PERRL.  LUNGS: CTA with good air movement.  HEART: Regular rate and rhythm without murmur.   EXTREMITIES: Without any cyanosis or clubbing. Non-pitting edema present. Surgical dressing in place on R knee.   NEUROLOGIC: Cranial nerves II through XII are grossly intact, no focal deficits.   SKIN: Petechiae on RLE improved. Normal temp and color.     Laboratory:  CBC:  No results for input(s): WBC, RBC, HGB, HCT, PLT, MCV, MCH, MCHC in the last 24 hours.  BMP:  Recent Labs   Lab 03/14/22  0437   GLU 98   *   K 4.2      CO2 23   BUN 13   CREATININE 0.8   CALCIUM 8.8        ASSESSMENT/PLAN:     Present on Admission:   Pyogenic arthritis of right knee joint; Prepatellar bursitis of right knee; Cellulitis   - Pt admitted previously for same Dx. Left AMA due to work   obligations.   -  ID consulted during last admission and preferred IV abx  until pt left AMA. Wound Cx showed S. Aureus.    - Ortho completed excision and debridement including  bursectomy of R knee (Dr. Hernandez)   - IV Cefazolin this admission   - ID appreciated   - Blood Cx negative for growth   - Wound Cx with MSSA   - will be d/c with PO Keflex   Iron deficiency anemia   - Pt on daily iron supplement   History of  DVT (deep venous thrombosis)   - US of RLE on 3/9/22 negative for signs of DVT   Hepatitis C   - chronic   HIV disease   - On daily Biktarvy   - CD4 > 200 in December 2021   - Viral Load <50 in December 2021   - Well controlled; Pt f/u with ID regularly.       ANTONIO Zayas-S2  [facility]   Physician Assistant Student

## 2022-03-15 LAB — BACTERIA SPEC ANAEROBE CULT: NORMAL

## 2022-03-15 NOTE — PLAN OF CARE
Discharge order noted.    discharge nurse will go over home medications and reasons for medications and final discharge instructions.      Patient can be discharged from CM standpoint    Patient to be called with appointment with [facility]; pt aware.         Future Appointments   Date Time Provider Department Center   3/22/2022  9:30 AM Mckenzie Mcwilliams PA-C [facility] ORTH[facility]        03/15/22 1615   Final Note   Assessment Type Final Discharge Note   Anticipated Discharge Disposition Home   What phone number can be called within the next 1-3 days to see how you are doing after discharge? [phone]

## 2022-03-15 NOTE — PHYSICIAN QUERY
PT Name: Daniel Good  MR #: [id number]    DOCUMENTATION CLARIFICATION     CDS/: JEFF Lamb, RN               Contact information:[email or web address]     This form is a permanent document in the medical record.     Query Date: March 15, 2022      By submitting this query, we are merely seeking further clarification of documentation. Please utilize your independent clinical judgment when addressing the question(s) below.    The Medical Record contains the following:   Indicators Supporting Clinical Findings Location in Medical Record    x HIV or AIDS                      HIV infection    HIV disease  On Biktarvy  CD4 > 200 on 12/2021  Viral load < 50 in 12.2021   [facility] H&P 03/09/2022    x CD4 Count          CD4% CD4 > 200 on 12/2021  Viral load < 50 in 12.2021 [facility] Medicine H&P 03/09/2022    History of or current Opportunistic Infection      x Acute/Chronic Illness Principal Problem:Pyogenic arthritis of right knee joint, Cellulitis  Hepatitis C, Iron deficiency anemia, History of DVT (deep venous thrombosis)  HIV disease [facility] H&P 03/09/2022    AIDS-Defining Condition or HIV-Related Illness documented      x Medication On Biktarvy      bfbxeeckp-qltdjcwb-pcmfvpk ala -25 mg (25 kg or greater) 1 tablet Dose: 1 tablet: Oral: Daily:      [facility] Medicine H&P 03/09/2022    :     MAR 03/09 & 03/10/2022    Other       The clinical guidelines noted are only a system guideline. It does not replace the providers clinical judgment.    The following are AIDS-Defining Conditions or HIV-Related Illnesses (noted in more recent literature as Opportunistic Illnesses in HIV Infection):   Candidiasis of bronchi, trachea, or lungs Lymphoma, Burkitt (or equivalent term)   Candidiasis of esophagus Lymphoma, immunoblastic (or equivalent term)   Cervical cancer, invasive only among adults, adolescents, and children aged > 6 years Lymphoma, primary, of brain   Coccidioidomycosis,  disseminated or extrapulmonary Mycobacterium avium complex or Mycobacterium kansasii, disseminated or extrapulmonary   Cryptococcosis, extrapulmonary Mycobacterium tuberculosis of any site, pulmonary (only among adults, adolescents, and children aged >6 years), disseminated, or extrapulmonary   Cryptosporidiosis, chronic intestinal (>1 months duration) Mycobacterium, other species or unidentified species, disseminated or extrapulmonary   Cytomegalovirus disease (other than liver, spleen, or nodes), onset at age >1 month Pneumocystitis jirovecii (previously known as Pneumocystis carinii) pneumonia   Cytomegalovirus retinitis (with loss of vision) Pneumonia, recurrent only among adults, adolescents, and children aged >6 years   Encephalopathy attributed to HIV Progressive multifocal leukoencephalopathy   Herpes simplex: chronic ulcers (>1 months  duration) or bronchitis, pneumonitis, or esophagitis (onset at age >1 month) Salmonella septicemia, recurrent   Histoplasmosis, disseminated or extrapulmonary Toxoplasmosis of brain, onset at age >1 month   Isosporiasis, chronic intestinal (>1 months duration) Wasting syndrome attributed to HIV   Kaposi sarcoma    Once a patient is diagnosed with HIV Disease or AIDS the diagnosis still stands even if, after treatment, the CD4+ T-lymphocyte count rises to above 200 µL or other AIDS-Defining Conditions or HIV-Related Illnesses are resolved.         Provider, please clarify the patient's HIV Status.  [ x  ] Asymptomatic HIV Infection - Positive Status Only without any history of or current AIDS-Defining Condition or HIV-Related Illness and without a history of or current CD4+ T-Lymphocyte count < 200 uL or total percentage of T-lymphocytes <14 (percentage is used only if the count is missing)   [   ] HIV Disease or AIDS, please check the applicable support for the diagnosis:    [   ] Patient has or had a history of CD4+ T-Lymphocyte count < 200 uL or total percentage of  T-lymphocytes <14 (percentage is used only if the count is missing)   [   ] Patient has or had a history of AIDS-Defining Condition or HIV-Related Illness (please specify if not previously documented): ______________   [   ] Patient has or had a history of CD4+ T-Lymphocyte count < 200 uL or total percentage of T-lymphocytes <14 (percentage is used only if the count is missing) and an AIDS-Defining Condition or HIV-Related Illness (please specify if not previously documented): ______________      [   ] Patient is HIV Negative   [   ] Other (please specify):________   [  ] Clinically Undetermined       Please document in your progress notes daily for the duration of treatment until resolved and include in your discharge summary.    References:  Revised Surveillance Case Definition for HIV Infection - [place], 2014. (2014, April 11). Retrieved November 09, 2020, from https://www.cdc.gov/mmwr/preview/mmwrhtml/wq5560x3.htm?s_cid=cb1912k3_x  TIRSO Cota MD. (2019, April). Techniques and interpretation of measurement of the CD4 cell count in HIV-infected patients ([id number] GABBY Cunningham MD & [id number] MARCELLO Brooks MD, MPH, Eds.). Retrieved November 09, 2020, from https://www.[employer]/contents/techniques-and-interpretation-lz-ygnaorvpowi-mt-the-cd4-cell-count-in-hiv-infected-patients?search=hiv%20disease%20cd4%20count&source=search_result&selectedTitle=1~150&usage_type=default&display_rank=1#[id number]  Form 05256

## 2022-03-15 NOTE — PLAN OF CARE
03/15/22 1619   Discharge Planning   Assessment Type Discharge Planning Brief Assessment   Support Systems Friends/neighbors   Equipment Currently Used at Home cane, straight;walker, rolling;crutches, axillary;bath bench   Current Living Arrangements home/apartment/condo   Patient/Family Anticipates Transition to home with family   Patient/Family Anticipated Services at Transition outpatient care   DME Needed Upon Discharge  none   Discharge Plan A Home   Discharge Plan B Home with family

## 2022-03-18 ENCOUNTER — OFFICE VISIT (OUTPATIENT)
Dept: ORTHOPEDICS | Facility: CLINIC | Age: 62
End: 2022-03-18
Payer: MEDICAID

## 2022-03-18 VITALS — BODY MASS INDEX: 21.67 KG/M2 | WEIGHT: 151 LBS

## 2022-03-18 DIAGNOSIS — M00.061 STAPHYLOCOCCAL ARTHRITIS OF RIGHT KNEE: ICD-10-CM

## 2022-03-18 DIAGNOSIS — M70.41 PREPATELLAR BURSITIS OF RIGHT KNEE: Primary | ICD-10-CM

## 2022-03-18 PROCEDURE — 99999 PR PBB SHADOW E&M-EST. PATIENT-LVL II: ICD-10-PCS | Mod: PBBFAC,,, | Performed by: ORTHOPAEDIC SURGERY

## 2022-03-18 PROCEDURE — 99024 PR POST-OP FOLLOW-UP VISIT: ICD-10-PCS | Mod: ,,, | Performed by: ORTHOPAEDIC SURGERY

## 2022-03-18 PROCEDURE — 3008F BODY MASS INDEX DOCD: CPT | Mod: CPTII,,, | Performed by: ORTHOPAEDIC SURGERY

## 2022-03-18 PROCEDURE — 1159F MED LIST DOCD IN RCRD: CPT | Mod: CPTII,,, | Performed by: ORTHOPAEDIC SURGERY

## 2022-03-18 PROCEDURE — 3008F PR BODY MASS INDEX (BMI) DOCUMENTED: ICD-10-PCS | Mod: CPTII,,, | Performed by: ORTHOPAEDIC SURGERY

## 2022-03-18 PROCEDURE — 99999 PR PBB SHADOW E&M-EST. PATIENT-LVL II: CPT | Mod: PBBFAC,,, | Performed by: ORTHOPAEDIC SURGERY

## 2022-03-18 PROCEDURE — 99212 OFFICE O/P EST SF 10 MIN: CPT | Mod: PBBFAC,PN | Performed by: ORTHOPAEDIC SURGERY

## 2022-03-18 PROCEDURE — 99024 POSTOP FOLLOW-UP VISIT: CPT | Mod: ,,, | Performed by: ORTHOPAEDIC SURGERY

## 2022-03-18 PROCEDURE — 1159F PR MEDICATION LIST DOCUMENTED IN MEDICAL RECORD: ICD-10-PCS | Mod: CPTII,,, | Performed by: ORTHOPAEDIC SURGERY
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
## 2022-03-23 NOTE — DISCHARGE SUMMARY
[facility] Medicine  Discharge Summary      Patient Name: Daniel Good  MRN: [id number]  Patient Class: IP- Inpatient  Admission Date: 3/12/2022  Hospital Length of Stay: 2 days  Discharge Date and Time: 3/14/2022  3:46 PM  Attending Physician: No att. providers found   Discharging Provider: Jeyson Mann NP  Primary Care Provider: Autumn Marti MD      HPI:   Daniel Good is a 60 y/o male who presents to the emergency department with right knee swelling and pain.  The patient states he had a prepatellar bursal infection to his right knee.  He states Dr. Hernandez, orthopedic surgeon,  did surgery on his knee on 03/09/2022.  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  He complains of persistent increased drainage to the knee. Wound cultures resulted 3/12-- noted with MSSA. Sensitivities noted, will start Cefazolin and will consult ID.       * No surgery found *      Hospital Course:   The patient was admitted for care under the  service. In the ED, hid right knee was drained and noted with serosanguinous drainage. He left the day before AMA and returned to the hospital for care. Wound cultures noted--Cultures grew MSSA - patient started on cefazolin IV. ID was consulted.   Wound cultures reviewed; ID recommended Keflex 500 mg PO BID x10 days. Will need follow up with ID and Ortho outpatient.        Goals of Care Treatment Preferences:  Code Status: Full Code      Consults:   Consults (From admission, onward)        Status Ordering Provider     Infectious Diseases  Once        Provider:  Wayne Mccord MD    Completed JEYSON MANN          No new Assessment & Plan notes have been filed under this hospital service since the last note was generated.  Service: Hospital Medicine    Final Active Diagnoses:    Diagnosis Date Noted POA    PRINCIPAL PROBLEM:  Pyogenic arthritis of right  knee joint [M00.9] 03/09/2022 Yes    Prepatellar bursitis of right knee [M70.41]  Yes    Iron deficiency anemia [D50.9] 03/09/2022 Yes    History of  DVT (deep venous thrombosis) [I82.409] 03/09/2022 Yes    Hepatitis C [B19.20] 03/09/2022 Yes    HIV disease [B20] 03/09/2022 Yes    Cellulitis [L03.90] 03/09/2022 Yes      Problems Resolved During this Admission:       Discharged Condition: stable    Disposition: Home or Self Care    Follow Up:   Follow-up Information     Autumn Harper MD Follow up in 1 week(s).    Specialty: Infectious Diseases  Contact information:  [address] [place] [id number] [phone]                       Patient Instructions:      Ambulatory referral/consult to Infectious Disease   Standing Status: Future   Referral Priority: Routine Referral Type: Consultation   Referral Reason: Specialty Services Required   Referred to Provider: AUTUMN HARPER Requested Specialty: Infectious Diseases   Number of Visits Requested: 1     Diet Cardiac     Notify your health care provider if you experience any of the following:  temperature >100.4     Notify your health care provider if you experience any of the following:  persistent nausea and vomiting or diarrhea     Notify your health care provider if you experience any of the following:  redness, tenderness, or signs of infection (pain, swelling, redness, odor or green/yellow discharge around incision site)     Notify your health care provider if you experience any of the following:  severe persistent headache     Notify your health care provider if you experience any of the following:  worsening rash     Activity as tolerated       Significant Diagnostic Studies: Labs: All labs within the past 24 hours have been reviewed    Pending Diagnostic Studies:     None         Medications:  Reconciled Home Medications:      Medication List      START taking these medications    cephALEXin 500 MG capsule  Commonly known as: KEFLEX  Take 1 capsule  (500 mg total) by mouth every 12 (twelve) hours. for 10 days        CONTINUE taking these medications    BIKTARVY -25 mg (25 kg or greater)  Generic drug: vkwpvxzdz-fqnthkrv-hmcqpei ala  Take 1 tablet by mouth once daily.        ASK your doctor about these medications    multivitamin per tablet  Commonly known as: THERAGRAN  Take 1 tablet by mouth once daily.     nadoloL 40 MG tablet  Commonly known as: CORGARD  Take 40 mg by mouth 3 (three) times daily.            Indwelling Lines/Drains at time of discharge:   Lines/Drains/Airways     None                 Time spent on the discharge of patient: 35 minutes         Jeyson Mann NP  Department of Hospital Medicine  [facility] Surg

## 2022-03-28 ENCOUNTER — OFFICE VISIT (OUTPATIENT)
Dept: ORTHOPEDICS | Facility: CLINIC | Age: 62
End: 2022-03-28
Payer: MEDICAID

## 2022-03-28 VITALS — HEIGHT: 70 IN | WEIGHT: 151 LBS | BODY MASS INDEX: 21.62 KG/M2

## 2022-03-28 DIAGNOSIS — M00.061 STAPHYLOCOCCAL ARTHRITIS OF RIGHT KNEE: Primary | ICD-10-CM

## 2022-03-28 PROCEDURE — 1159F MED LIST DOCD IN RCRD: CPT | Mod: CPTII,,, | Performed by: ORTHOPAEDIC SURGERY

## 2022-03-28 PROCEDURE — 99024 POSTOP FOLLOW-UP VISIT: CPT | Mod: ,,, | Performed by: ORTHOPAEDIC SURGERY

## 2022-03-28 PROCEDURE — 3008F BODY MASS INDEX DOCD: CPT | Mod: CPTII,,, | Performed by: ORTHOPAEDIC SURGERY

## 2022-03-28 PROCEDURE — 99999 PR PBB SHADOW E&M-EST. PATIENT-LVL II: CPT | Mod: PBBFAC,,, | Performed by: ORTHOPAEDIC SURGERY

## 2022-03-28 PROCEDURE — 1160F RVW MEDS BY RX/DR IN RCRD: CPT | Mod: CPTII,,, | Performed by: ORTHOPAEDIC SURGERY

## 2022-03-28 PROCEDURE — 99024 PR POST-OP FOLLOW-UP VISIT: ICD-10-PCS | Mod: ,,, | Performed by: ORTHOPAEDIC SURGERY

## 2022-03-28 PROCEDURE — 99212 OFFICE O/P EST SF 10 MIN: CPT | Mod: PBBFAC,PN | Performed by: ORTHOPAEDIC SURGERY

## 2022-03-28 PROCEDURE — 1160F PR REVIEW ALL MEDS BY PRESCRIBER/CLIN PHARMACIST DOCUMENTED: ICD-10-PCS | Mod: CPTII,,, | Performed by: ORTHOPAEDIC SURGERY

## 2022-03-28 PROCEDURE — 99999 PR PBB SHADOW E&M-EST. PATIENT-LVL II: ICD-10-PCS | Mod: PBBFAC,,, | Performed by: ORTHOPAEDIC SURGERY

## 2022-03-28 PROCEDURE — 1159F PR MEDICATION LIST DOCUMENTED IN MEDICAL RECORD: ICD-10-PCS | Mod: CPTII,,, | Performed by: ORTHOPAEDIC SURGERY

## 2022-03-28 PROCEDURE — 3008F PR BODY MASS INDEX (BMI) DOCUMENTED: ICD-10-PCS | Mod: CPTII,,, | Performed by: ORTHOPAEDIC SURGERY

## 2022-03-28 RX ORDER — AMOXICILLIN AND CLAVULANATE POTASSIUM 875; 125 MG/1; MG/1
1 TABLET, FILM COATED ORAL 2 TIMES DAILY
Qty: 20 TABLET | Refills: 1 | Status: SHIPPED | OUTPATIENT
Start: 2022-03-28 | End: 2023-01-26

## 2022-03-28 RX ORDER — CLINDAMYCIN HYDROCHLORIDE 300 MG/1
300 CAPSULE ORAL 3 TIMES DAILY
Qty: 30 CAPSULE | Refills: 0 | Status: SHIPPED | OUTPATIENT
Start: 2022-03-28 | End: 2022-03-28
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
## 2022-04-14 ENCOUNTER — IMMUNIZATION (OUTPATIENT)
Dept: INTERNAL MEDICINE | Facility: CLINIC | Age: 62
End: 2022-04-14
Payer: MEDICAID

## 2022-04-14 DIAGNOSIS — Z23 NEED FOR VACCINATION: Primary | ICD-10-CM

## 2022-04-14 PROCEDURE — 91305 COVID-19, MRNA, LNP-S, PF, 30 MCG/0.3 ML DOSE VACCINE (PFIZER): CPT | Mod: PBBFAC,PO

## 2022-04-21 ENCOUNTER — OFFICE VISIT (OUTPATIENT)
Dept: ORTHOPEDICS | Facility: CLINIC | Age: 62
End: 2022-04-21
Payer: MEDICAID

## 2022-04-21 VITALS — WEIGHT: 165 LBS | HEIGHT: 70 IN | BODY MASS INDEX: 23.62 KG/M2

## 2022-04-21 DIAGNOSIS — M70.41 PREPATELLAR BURSITIS OF RIGHT KNEE: Primary | ICD-10-CM

## 2022-04-21 PROCEDURE — 3008F PR BODY MASS INDEX (BMI) DOCUMENTED: ICD-10-PCS | Mod: CPTII,,, | Performed by: ORTHOPAEDIC SURGERY

## 2022-04-21 PROCEDURE — 1159F MED LIST DOCD IN RCRD: CPT | Mod: CPTII,,, | Performed by: ORTHOPAEDIC SURGERY

## 2022-04-21 PROCEDURE — 99024 PR POST-OP FOLLOW-UP VISIT: ICD-10-PCS | Mod: ,,, | Performed by: ORTHOPAEDIC SURGERY

## 2022-04-21 PROCEDURE — 99024 POSTOP FOLLOW-UP VISIT: CPT | Mod: ,,, | Performed by: ORTHOPAEDIC SURGERY

## 2022-04-21 PROCEDURE — 99999 PR PBB SHADOW E&M-EST. PATIENT-LVL III: CPT | Mod: PBBFAC,,, | Performed by: ORTHOPAEDIC SURGERY

## 2022-04-21 PROCEDURE — 1159F PR MEDICATION LIST DOCUMENTED IN MEDICAL RECORD: ICD-10-PCS | Mod: CPTII,,, | Performed by: ORTHOPAEDIC SURGERY

## 2022-04-21 PROCEDURE — 99213 OFFICE O/P EST LOW 20 MIN: CPT | Mod: PBBFAC,PN | Performed by: ORTHOPAEDIC SURGERY

## 2022-04-21 PROCEDURE — 99999 PR PBB SHADOW E&M-EST. PATIENT-LVL III: ICD-10-PCS | Mod: PBBFAC,,, | Performed by: ORTHOPAEDIC SURGERY

## 2022-04-21 PROCEDURE — 3008F BODY MASS INDEX DOCD: CPT | Mod: CPTII,,, | Performed by: ORTHOPAEDIC SURGERY

## 2022-04-21 RX ORDER — FERROUS SULFATE 325(65) MG
TABLET, DELAYED RELEASE (ENTERIC COATED) ORAL EVERY MORNING
COMMUNITY
Start: 2022-04-19

## 2022-04-21 RX ORDER — CEPHALEXIN 500 MG/1
500 CAPSULE ORAL 4 TIMES DAILY
COMMUNITY
Start: 2022-04-19 | End: 2023-01-26

## 2022-04-21 RX ORDER — FERROUS SULFATE, DRIED 160(50) MG
TABLET, EXTENDED RELEASE ORAL
COMMUNITY
Start: 2022-04-19
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
## 2022-08-25 ENCOUNTER — HOSPITAL ENCOUNTER (EMERGENCY)
Facility: HOSPITAL | Age: 62
Discharge: HOME | End: 2022-08-25
Payer: MEDICAID

## 2022-08-25 VITALS
DIASTOLIC BLOOD PRESSURE: 77 MMHG | SYSTOLIC BLOOD PRESSURE: 122 MMHG | OXYGEN SATURATION: 96 % | RESPIRATION RATE: 16 BRPM | TEMPERATURE: 98 F | HEART RATE: 72 BPM

## 2022-08-25 DIAGNOSIS — R23.4 SKIN INDURATION: ICD-10-CM

## 2022-08-25 DIAGNOSIS — L03.115 CELLULITIS OF RIGHT THIGH: Primary | ICD-10-CM

## 2022-08-25 LAB
ALBUMIN SERPL BCP-MCNC: 2.8 G/DL (ref 3.5–5.2)
ALP SERPL-CCNC: 81 U/L (ref 55–135)
ALT SERPL W/O P-5'-P-CCNC: 29 U/L (ref 10–44)
ANION GAP SERPL CALC-SCNC: 9 MMOL/L (ref 8–16)
AST SERPL-CCNC: 28 U/L (ref 10–40)
BASOPHILS # BLD AUTO: 0.01 K/UL (ref 0–0.2)
BASOPHILS NFR BLD: 0.3 % (ref 0–1.9)
BILIRUB SERPL-MCNC: 0.9 MG/DL (ref 0.1–1)
BUN SERPL-MCNC: 10 MG/DL (ref 6–20)
CALCIUM SERPL-MCNC: 8.8 MG/DL (ref 8.7–10.5)
CHLORIDE SERPL-SCNC: 108 MMOL/L (ref 95–110)
CO2 SERPL-SCNC: 24 MMOL/L (ref 23–29)
CREAT SERPL-MCNC: 0.7 MG/DL (ref 0.5–1.4)
DIFFERENTIAL METHOD: ABNORMAL
EOSINOPHIL # BLD AUTO: 0.1 K/UL (ref 0–0.5)
EOSINOPHIL NFR BLD: 3.8 % (ref 0–8)
ERYTHROCYTE [DISTWIDTH] IN BLOOD BY AUTOMATED COUNT: 15.1 % (ref 11.5–14.5)
EST. GFR  (NO RACE VARIABLE): >60 ML/MIN/1.73 M^2
GLUCOSE SERPL-MCNC: 123 MG/DL (ref 70–110)
HCT VFR BLD AUTO: 29.1 % (ref 40–54)
HGB BLD-MCNC: 9.8 G/DL (ref 14–18)
IMM GRANULOCYTES # BLD AUTO: 0.01 K/UL (ref 0–0.04)
IMM GRANULOCYTES NFR BLD AUTO: 0.3 % (ref 0–0.5)
LYMPHOCYTES # BLD AUTO: 0.5 K/UL (ref 1–4.8)
LYMPHOCYTES NFR BLD: 16.3 % (ref 18–48)
MAGNESIUM SERPL-MCNC: 1.8 MG/DL (ref 1.6–2.6)
MCH RBC QN AUTO: 30.2 PG (ref 27–31)
MCHC RBC AUTO-ENTMCNC: 33.7 G/DL (ref 32–36)
MCV RBC AUTO: 90 FL (ref 82–98)
MONOCYTES # BLD AUTO: 0.3 K/UL (ref 0.3–1)
MONOCYTES NFR BLD: 9.9 % (ref 4–15)
NEUTROPHILS # BLD AUTO: 2.2 K/UL (ref 1.8–7.7)
NEUTROPHILS NFR BLD: 69.4 % (ref 38–73)
NRBC BLD-RTO: 0 /100 WBC
PLATELET # BLD AUTO: 93 K/UL (ref 150–450)
PMV BLD AUTO: 10.2 FL (ref 9.2–12.9)
POTASSIUM SERPL-SCNC: 4.1 MMOL/L (ref 3.5–5.1)
PROT SERPL-MCNC: 6.3 G/DL (ref 6–8.4)
RBC # BLD AUTO: 3.25 M/UL (ref 4.6–6.2)
SODIUM SERPL-SCNC: 141 MMOL/L (ref 136–145)
WBC # BLD AUTO: 3.12 K/UL (ref 3.9–12.7)

## 2022-08-25 PROCEDURE — 96365 THER/PROPH/DIAG IV INF INIT: CPT

## 2022-08-25 PROCEDURE — 83735 ASSAY OF MAGNESIUM: CPT

## 2022-08-25 PROCEDURE — 25000003 PHARM REV CODE 250

## 2022-08-25 PROCEDURE — 80053 COMPREHEN METABOLIC PANEL: CPT

## 2022-08-25 PROCEDURE — 99284 EMERGENCY DEPT VISIT MOD MDM: CPT | Mod: 25

## 2022-08-25 PROCEDURE — 85025 COMPLETE CBC W/AUTO DIFF WBC: CPT

## 2022-08-25 RX ORDER — CLINDAMYCIN HYDROCHLORIDE 150 MG/1
450 CAPSULE ORAL EVERY 8 HOURS
Qty: 90 CAPSULE | Refills: 0 | Status: SHIPPED | OUTPATIENT
Start: 2022-08-25 | End: 2022-09-04

## 2022-08-25 RX ORDER — CLINDAMYCIN PHOSPHATE 600 MG/50ML
600 INJECTION, SOLUTION INTRAVENOUS
Status: COMPLETED | OUTPATIENT
Start: 2022-08-25 | End: 2022-08-25

## 2022-08-25 RX ADMIN — CLINDAMYCIN IN 5 PERCENT DEXTROSE 600 MG: 12 INJECTION, SOLUTION INTRAVENOUS at 03:08

## 2022-08-25 NOTE — DISCHARGE INSTRUCTIONS
The antibiotic (clindamycin) prescribed for your skin infection can commonly cause diarrhea as a side effect.  To help prevent this, you can either take a probiotic supplement (over the counter), or eat yogurt daily to keep good bacteria in your GI system.

## 2022-08-25 NOTE — ED NOTES
Presents with 3 day hx of rash to right thigh, red angry spot surrounded by purple to red rash, not raised, not irritated, some pain and tenderness to sight.  Patient reports fever on Sunday, but nil fever at [facility], or for last 2 days.    Airway patent,  Breathing spontaneously, maintaining oxygen saturations above 95% on room air  Heart rate within normal limits, normotensive  Alert and orientated  Afebrile, mild reports of pain and discomfort.    CANDACE DE
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
## 2023-01-25 ENCOUNTER — OFFICE VISIT (OUTPATIENT)
Dept: URGENT CARE | Facility: CLINIC | Age: 63
End: 2023-01-25
Payer: MEDICAID

## 2023-01-25 VITALS
TEMPERATURE: 100 F | OXYGEN SATURATION: 95 % | HEIGHT: 70 IN | BODY MASS INDEX: 24.34 KG/M2 | HEART RATE: 111 BPM | DIASTOLIC BLOOD PRESSURE: 103 MMHG | WEIGHT: 170 LBS | RESPIRATION RATE: 20 BRPM | SYSTOLIC BLOOD PRESSURE: 175 MMHG

## 2023-01-25 DIAGNOSIS — H60.90 OTITIS EXTERNA, UNSPECIFIED CHRONICITY, UNSPECIFIED LATERALITY, UNSPECIFIED TYPE: ICD-10-CM

## 2023-01-25 DIAGNOSIS — L03.90 CELLULITIS, UNSPECIFIED CELLULITIS SITE: ICD-10-CM

## 2023-01-25 DIAGNOSIS — R05.9 COUGH, UNSPECIFIED TYPE: Primary | ICD-10-CM

## 2023-01-25 LAB
CTP QC/QA: YES
CTP QC/QA: YES
POC MOLECULAR INFLUENZA A AGN: NEGATIVE
POC MOLECULAR INFLUENZA B AGN: NEGATIVE
SARS-COV-2 AG RESP QL IA.RAPID: NEGATIVE

## 2023-01-25 PROCEDURE — 87502 POCT INFLUENZA A/B MOLECULAR: ICD-10-PCS | Mod: QW,S$GLB,, | Performed by: FAMILY MEDICINE

## 2023-01-25 PROCEDURE — 3008F PR BODY MASS INDEX (BMI) DOCUMENTED: ICD-10-PCS | Mod: CPTII,S$GLB,, | Performed by: FAMILY MEDICINE

## 2023-01-25 PROCEDURE — 3077F PR MOST RECENT SYSTOLIC BLOOD PRESSURE >= 140 MM HG: ICD-10-PCS | Mod: CPTII,S$GLB,, | Performed by: FAMILY MEDICINE

## 2023-01-25 PROCEDURE — 87811 SARS-COV-2 COVID19 W/OPTIC: CPT | Mod: QW,S$GLB,, | Performed by: FAMILY MEDICINE

## 2023-01-25 PROCEDURE — 3077F SYST BP >= 140 MM HG: CPT | Mod: CPTII,S$GLB,, | Performed by: FAMILY MEDICINE

## 2023-01-25 PROCEDURE — 3080F PR MOST RECENT DIASTOLIC BLOOD PRESSURE >= 90 MM HG: ICD-10-PCS | Mod: CPTII,S$GLB,, | Performed by: FAMILY MEDICINE

## 2023-01-25 PROCEDURE — 99214 PR OFFICE/OUTPT VISIT, EST, LEVL IV, 30-39 MIN: ICD-10-PCS | Mod: S$GLB,,, | Performed by: FAMILY MEDICINE

## 2023-01-25 PROCEDURE — 3080F DIAST BP >= 90 MM HG: CPT | Mod: CPTII,S$GLB,, | Performed by: FAMILY MEDICINE

## 2023-01-25 PROCEDURE — 1159F MED LIST DOCD IN RCRD: CPT | Mod: CPTII,S$GLB,, | Performed by: FAMILY MEDICINE

## 2023-01-25 PROCEDURE — 1159F PR MEDICATION LIST DOCUMENTED IN MEDICAL RECORD: ICD-10-PCS | Mod: CPTII,S$GLB,, | Performed by: FAMILY MEDICINE

## 2023-01-25 PROCEDURE — 99214 OFFICE O/P EST MOD 30 MIN: CPT | Mod: S$GLB,,, | Performed by: FAMILY MEDICINE

## 2023-01-25 PROCEDURE — 87502 INFLUENZA DNA AMP PROBE: CPT | Mod: QW,S$GLB,, | Performed by: FAMILY MEDICINE

## 2023-01-25 PROCEDURE — 87811 SARS CORONAVIRUS 2 ANTIGEN POCT, MANUAL READ: ICD-10-PCS | Mod: QW,S$GLB,, | Performed by: FAMILY MEDICINE

## 2023-01-25 PROCEDURE — 3008F BODY MASS INDEX DOCD: CPT | Mod: CPTII,S$GLB,, | Performed by: FAMILY MEDICINE

## 2023-01-25 PROCEDURE — 1160F PR REVIEW ALL MEDS BY PRESCRIBER/CLIN PHARMACIST DOCUMENTED: ICD-10-PCS | Mod: CPTII,S$GLB,, | Performed by: FAMILY MEDICINE

## 2023-01-25 PROCEDURE — 1160F RVW MEDS BY RX/DR IN RCRD: CPT | Mod: CPTII,S$GLB,, | Performed by: FAMILY MEDICINE

## 2023-01-25 RX ORDER — NEOMYCIN SULFATE, POLYMYXIN B SULFATE AND HYDROCORTISONE 10; 3.5; 1 MG/ML; MG/ML; [USP'U]/ML
3 SUSPENSION/ DROPS AURICULAR (OTIC) 3 TIMES DAILY
Qty: 10 ML | Refills: 0 | Status: SHIPPED | OUTPATIENT
Start: 2023-01-25

## 2023-01-25 RX ORDER — CEPHALEXIN 500 MG/1
500 CAPSULE ORAL EVERY 6 HOURS
Qty: 30 CAPSULE | Refills: 0 | Status: SHIPPED | OUTPATIENT
Start: 2023-01-25 | End: 2023-04-20

## 2023-01-25 NOTE — PROGRESS NOTES
"Subjective:       Patient ID: Daniel Good is a 62 y.o. male.    Vitals:  height is 5' 10" (1.778 m) and weight is 77.1 kg (170 lb). His temperature is 99.7 °F (37.6 °C). His blood pressure is 175/103 (abnormal) and his pulse is 111 (abnormal). His respiration is 20 and oxygen saturation is 95%.     Chief Complaint: Otalgia and ear infection    This is a 62 y.o. male who presents today with a chief complaint of right ear infection.     Sx started Sunday night.     Sx include ear swelling, redness, flaking, swelling to right side of face, . Uri Sx include fever, coughing which have passed.     Pt has taken benadryl, tylenol with moderate relief.     Otalgia     HENT:  Positive for ear pain.    Skin:  Positive for erythema.     Objective:      Physical Exam   Constitutional: He is oriented to person, place, and time. No distress. normal  HENT:   Head: Normocephalic and atraumatic.   Ears:   Right Ear: Hearing and tympanic membrane normal. There is drainage, swelling and tenderness. Tympanic membrane is not perforated and not erythematous.   Left Ear: Hearing, tympanic membrane, external ear and ear canal normal.   Ears:    Nose: Nose normal. No rhinorrhea or congestion.   Mouth/Throat: Mucous membranes are moist. No oropharyngeal exudate or posterior oropharyngeal erythema. Oropharynx is clear.   Redness and scaling of the pinna of the right ear.      Comments: Redness and scaling of the pinna of the right ear.  Neck: Neck supple.   Cardiovascular: Normal pulses.   Pulmonary/Chest: Effort normal and breath sounds normal.   Abdominal: Normal appearance and bowel sounds are normal. Soft. flat abdomen   Musculoskeletal: Normal range of motion.         General: Normal range of motion.   Neurological: no focal deficit. He is alert, oriented to person, place, and time and at baseline.   Skin: Skin is warm. Capillary refill takes less than 2 seconds. erythema   Psychiatric: His behavior is normal. Mood, judgment " and thought content normal.   Nursing note and vitals reviewed.      Assessment:Plan:    1. Cough, unspecified type  - SARS Coronavirus 2 Antigen, POCT Manual Read  - POCT Influenza A/B MOLECULAR    2. Cellulitis, unspecified cellulitis site  - cephALEXin (KEFLEX) 500 MG capsule; Take 1 capsule (500 mg total) by mouth every 6 (six) hours.  Dispense: 30 capsule; Refill: 0    3. Otitis externa, unspecified chronicity, unspecified laterality, unspecified type  - neomycin-polymyxin-hydrocortisone (CORTISPORIN) 3.5-10,000-1 mg/mL-unit/mL-% otic suspension; Place 3 drops into the right ear 3 (three) times daily.  Dispense: 10 mL; Refill: 0     All results discussed with pt prior to discharge                                  no

## 2023-04-20 ENCOUNTER — OFFICE VISIT (OUTPATIENT)
Dept: URGENT CARE | Facility: CLINIC | Age: 63
End: 2023-04-20
Payer: MEDICAID

## 2023-04-20 VITALS
SYSTOLIC BLOOD PRESSURE: 133 MMHG | WEIGHT: 170 LBS | DIASTOLIC BLOOD PRESSURE: 86 MMHG | HEART RATE: 75 BPM | BODY MASS INDEX: 24.34 KG/M2 | TEMPERATURE: 98 F | HEIGHT: 70 IN | OXYGEN SATURATION: 96 % | RESPIRATION RATE: 18 BRPM

## 2023-04-20 DIAGNOSIS — L03.90 CELLULITIS, UNSPECIFIED CELLULITIS SITE: Primary | ICD-10-CM

## 2023-04-20 PROCEDURE — 99213 PR OFFICE/OUTPT VISIT, EST, LEVL III, 20-29 MIN: ICD-10-PCS | Mod: S$GLB,,,

## 2023-04-20 PROCEDURE — 99213 OFFICE O/P EST LOW 20 MIN: CPT | Mod: S$GLB,,,

## 2023-04-20 RX ORDER — MUPIROCIN 20 MG/G
OINTMENT TOPICAL 2 TIMES DAILY
Qty: 1 G | Refills: 0 | Status: SHIPPED | OUTPATIENT
Start: 2023-04-20 | End: 2023-05-04

## 2023-04-20 RX ORDER — CEPHALEXIN 500 MG/1
500 CAPSULE ORAL EVERY 12 HOURS
Qty: 14 CAPSULE | Refills: 0 | Status: SHIPPED | OUTPATIENT
Start: 2023-04-20 | End: 2023-04-27

## 2023-04-20 NOTE — PROGRESS NOTES
"Subjective:      Patient ID: Daniel Good is a 62 y.o. male.    Vitals:  height is 5' 10" (1.778 m) and weight is 77.1 kg (170 lb). His temperature is 97.9 °F (36.6 °C). His blood pressure is 133/86 and his pulse is 75. His respiration is 18 and oxygen saturation is 96%.     Chief Complaint: Rash (Left leg)    62 yr male present with possible rash and left leg pain. Pt states that he was working in his garden and his leg began to itch so he scratched it  and now it is red and painful. Symptoms started two days ago.treatments at home include nothing denies fever, nausea vomiting    Rash  This is a new problem. The current episode started in the past 7 days. The problem has been gradually worsening since onset. The affected locations include the left lower leg. The rash is characterized by pain, redness and swelling. It is unknown if there was an exposure to a precipitant. Past treatments include nothing. The treatment provided no relief.     Skin:  Positive for rash. Negative for erythema.    Objective:     Physical Exam   Constitutional: He is oriented to person, place, and time. He appears well-developed.   HENT:   Head: Normocephalic and atraumatic. Head is without abrasion, without contusion and without laceration.   Ears:   Right Ear: External ear normal.   Left Ear: External ear normal.   Nose: Nose normal.   Mouth/Throat: Oropharynx is clear and moist and mucous membranes are normal.   Eyes: Conjunctivae, EOM and lids are normal. Pupils are equal, round, and reactive to light.   Neck: Trachea normal and phonation normal. Neck supple.   Cardiovascular: Normal rate, regular rhythm and normal heart sounds.   Musculoskeletal: Normal range of motion.         General: Normal range of motion.      Right ankle: Normal. He exhibits normal range of motion and no swelling. No tenderness.      Left ankle: Normal. He exhibits normal range of motion and no swelling. No tenderness.      Right lower leg: Normal. He " exhibits no tenderness, no bony tenderness, no swelling, no deformity and no laceration. No edema.      Left lower leg: He exhibits swelling. He exhibits no tenderness, no bony tenderness, no deformity and no laceration. No edema.      Right foot: Normal. Normal range of motion and normal capillary refill. No tenderness, bony tenderness or swelling.      Left foot: Normal. Normal range of motion and normal capillary refill. No tenderness, bony tenderness or swelling.      Comments: Left ankle is swollen and slightly tender patient reports that the swelling is normal for patient also left leg from calf to foot is hyperpigmented brown patient reports this is normal for patient.  Patient with 2 areas cellulitis noted see skin section for further detail.  Patient full ROM of left ankle.  Left pedal pulse present cap refill less than 2 seconds   Neurological: He is alert and oriented to person, place, and time.   Skin: Skin is warm, dry, intact and no rash. Capillary refill takes less than 2 seconds. not left lower leg and not right lower legNo abrasion, No burn, No bruising, No erythema and No ecchymosis        Psychiatric: His speech is normal and behavior is normal. Judgment and thought content normal.   Nursing note and vitals reviewed.    Assessment:     1. Cellulitis, unspecified cellulitis site        Plan:       Cellulitis, unspecified cellulitis site  -     cephALEXin (KEFLEX) 500 MG capsule; Take 1 capsule (500 mg total) by mouth every 12 (twelve) hours. for 7 days  Dispense: 14 capsule; Refill: 0  -     mupirocin (BACTROBAN) 2 % ointment; Apply topically 2 (two) times daily. for 14 days  Dispense: 1 g; Refill: 0          Medical Decision Making:   Initial Assessment:   PT in room AAOX4, skin W/D, resp E/U, non toxic in appearance, NAD.  To areas appear to be cellulitic to left calf.  Urgent Care Management:  Vital signs stable nontoxic in appearance Discussed with PT if condition worsens or fails to improve we  recommend that you receive another evaluation at the ER immediately or contact your PCP to discuss your concerns or return here.  Reviewed Avoid picking or manipulating the wound and clean the wound twice a day and put the antibiotic ointment on it. Reviewed PT should seek ER treatment if fever, increase pain, swelling or other signs of increasing infection. Discussed to take Keflex as prescribed and to completion. Reviewed was Tylenol or ibuprofen can also be used as directed for pain .Return in 48- 72 hours for recheck if there is no improvement.

## 2023-04-20 NOTE — PATIENT INSTRUCTIONS
Cellulitis   If your condition worsens or fails to improve we recommend that you receive another evaluation at the ER immediately or contact your PCP to discuss your concerns or return here. You must understand that you've received an urgent care treatment only and that you may be released before all your medical problems are known or treated. You the patient will arrange for follouwp care as instructed.     Use warm compresses for 20 minutes 3-5 times a day. Avoid picking or manipulating the wound. Clean the wound twice a day and put the antibiotic ointment on it. You should seek ER treatment if fever, increase pain, swelling or other signs of increasing infection.   If you were prescribed antibiotics, please take them to completion  If you are female and on BCP use additional methods to prevent pregnancy while on antibiotics and for one cycle after.   If you were given narcotics do not drive or operate heavy equipment or machinery while taking these medications.   Tylenol or ibuprofen can also be used as directed for pain unless you have an allergy to them or medical condition such as stomach ulcers, kidney or liver disease or blood thinners etc for which you should not be taking these type of medications.   Return in 48- 72 hours for recheck if there is no improvement.

## 2023-04-28 ENCOUNTER — HOSPITAL ENCOUNTER (EMERGENCY)
Facility: HOSPITAL | Age: 63
Discharge: HOME | End: 2023-04-28
Attending: EMERGENCY MEDICINE
Payer: MEDICAID

## 2023-04-28 VITALS
HEART RATE: 87 BPM | SYSTOLIC BLOOD PRESSURE: 171 MMHG | TEMPERATURE: 98 F | OXYGEN SATURATION: 99 % | BODY MASS INDEX: 24.39 KG/M2 | WEIGHT: 170 LBS | RESPIRATION RATE: 18 BRPM | DIASTOLIC BLOOD PRESSURE: 100 MMHG

## 2023-04-28 DIAGNOSIS — L03.90 CELLULITIS: ICD-10-CM

## 2023-04-28 LAB
ALBUMIN SERPL BCP-MCNC: 3.6 G/DL (ref 3.5–5.2)
ALP SERPL-CCNC: 78 U/L (ref 55–135)
ALT SERPL W/O P-5'-P-CCNC: 23 U/L (ref 10–44)
ANION GAP SERPL CALC-SCNC: 9 MMOL/L (ref 8–16)
AST SERPL-CCNC: 30 U/L (ref 10–40)
BASOPHILS # BLD AUTO: 0.01 K/UL (ref 0–0.2)
BASOPHILS NFR BLD: 0.4 % (ref 0–1.9)
BILIRUB SERPL-MCNC: 1.3 MG/DL (ref 0.1–1)
BUN SERPL-MCNC: 16 MG/DL (ref 6–20)
CALCIUM SERPL-MCNC: 9.1 MG/DL (ref 8.7–10.5)
CHLORIDE SERPL-SCNC: 108 MMOL/L (ref 95–110)
CO2 SERPL-SCNC: 23 MMOL/L (ref 23–29)
CREAT SERPL-MCNC: 0.8 MG/DL (ref 0.5–1.4)
DIFFERENTIAL METHOD: ABNORMAL
EOSINOPHIL # BLD AUTO: 0.1 K/UL (ref 0–0.5)
EOSINOPHIL NFR BLD: 3.4 % (ref 0–8)
ERYTHROCYTE [DISTWIDTH] IN BLOOD BY AUTOMATED COUNT: 15.8 % (ref 11.5–14.5)
EST. GFR  (NO RACE VARIABLE): >60 ML/MIN/1.73 M^2
GLUCOSE SERPL-MCNC: 107 MG/DL (ref 70–110)
HCT VFR BLD AUTO: 32 % (ref 40–54)
HGB BLD-MCNC: 10.5 G/DL (ref 14–18)
IMM GRANULOCYTES # BLD AUTO: 0.01 K/UL (ref 0–0.04)
IMM GRANULOCYTES NFR BLD AUTO: 0.4 % (ref 0–0.5)
LACTATE SERPL-SCNC: 1.2 MMOL/L (ref 0.5–2.2)
LYMPHOCYTES # BLD AUTO: 0.7 K/UL (ref 1–4.8)
LYMPHOCYTES NFR BLD: 26.2 % (ref 18–48)
MCH RBC QN AUTO: 30.2 PG (ref 27–31)
MCHC RBC AUTO-ENTMCNC: 32.8 G/DL (ref 32–36)
MCV RBC AUTO: 92 FL (ref 82–98)
MONOCYTES # BLD AUTO: 0.3 K/UL (ref 0.3–1)
MONOCYTES NFR BLD: 10.9 % (ref 4–15)
NEUTROPHILS # BLD AUTO: 1.6 K/UL (ref 1.8–7.7)
NEUTROPHILS NFR BLD: 58.7 % (ref 38–73)
NRBC BLD-RTO: 0 /100 WBC
PLATELET # BLD AUTO: 115 K/UL (ref 150–450)
PMV BLD AUTO: 9.2 FL (ref 9.2–12.9)
POTASSIUM SERPL-SCNC: 4 MMOL/L (ref 3.5–5.1)
PROT SERPL-MCNC: 7.4 G/DL (ref 6–8.4)
RBC # BLD AUTO: 3.48 M/UL (ref 4.6–6.2)
SODIUM SERPL-SCNC: 140 MMOL/L (ref 136–145)
WBC # BLD AUTO: 2.67 K/UL (ref 3.9–12.7)

## 2023-04-28 PROCEDURE — 25000003 PHARM REV CODE 250: Performed by: EMERGENCY MEDICINE

## 2023-04-28 PROCEDURE — 96365 THER/PROPH/DIAG IV INF INIT: CPT

## 2023-04-28 PROCEDURE — 63600175 PHARM REV CODE 636 W HCPCS: Performed by: EMERGENCY MEDICINE

## 2023-04-28 PROCEDURE — 85025 COMPLETE CBC W/AUTO DIFF WBC: CPT | Performed by: EMERGENCY MEDICINE

## 2023-04-28 PROCEDURE — 83605 ASSAY OF LACTIC ACID: CPT | Performed by: EMERGENCY MEDICINE

## 2023-04-28 PROCEDURE — 80053 COMPREHEN METABOLIC PANEL: CPT | Performed by: EMERGENCY MEDICINE

## 2023-04-28 PROCEDURE — 99284 EMERGENCY DEPT VISIT MOD MDM: CPT | Mod: 25

## 2023-04-28 PROCEDURE — 87040 BLOOD CULTURE FOR BACTERIA: CPT | Mod: 59 | Performed by: EMERGENCY MEDICINE

## 2023-04-28 RX ORDER — DOXYCYCLINE 100 MG/1
100 CAPSULE ORAL 2 TIMES DAILY
Qty: 20 CAPSULE | Refills: 0 | Status: SHIPPED | OUTPATIENT
Start: 2023-04-28 | End: 2023-05-08

## 2023-04-28 RX ADMIN — PIPERACILLIN AND TAZOBACTAM 4.5 G: 4; .5 INJECTION, POWDER, LYOPHILIZED, FOR SOLUTION INTRAVENOUS; PARENTERAL at 09:04

## 2023-04-28 NOTE — ED TRIAGE NOTES
Pt admitted to the ED for cellulitis. Pt reports he was working in his yard last week when he began with redness and warmth to the right lower extremity. No open wounds noted to the skin at this time. Pt reports he was seen at urgent care and placed on Keflex. Pt reports slight improvement from antibiotic therapy but area remains red, swollen, warm, and discolored. Pt reports this extremity is at baseline discolored but reports increased redness. Pt denies fever, chills, SOB, or nausea.
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
## 2023-05-03 LAB
BACTERIA BLD CULT: NORMAL
BACTERIA BLD CULT: NORMAL

## 2023-10-23 NOTE — PROGRESS NOTES
"Subjective:       Patient ID: Daniel Good is a 59 y.o. male.    Vitals:  height is 5' 10" (1.778 m) and weight is 77.1 kg (170 lb). His oral temperature is 98.5 °F (36.9 °C). His blood pressure is 172/90 (abnormal) and his pulse is 98. His respiration is 16 and oxygen saturation is 98%.     Chief Complaint: Eye Problem    Pt c/o PINK EYE on R side. This is pt's 3rd episode after fully recovering from dx of PINK EYE and continues to have recurrent episodes. SP  Patient denies any injury or trauma has used Cipro eyedrops but a month ago and no relief does not wear any contact lenses no other eye injury    Eye Problem    The right eye is affected. This is a recurrent problem. The current episode started in the past 7 days (x3 days). The problem occurs constantly. The problem has been unchanged. There was no injury mechanism. The pain is at a severity of 4/10. There is known exposure (3rd episode) to pink eye. Associated symptoms include an eye discharge and eye redness. Pertinent negatives include no blurred vision, double vision, fever, itching, nausea, photophobia or vomiting. Treatments tried: Rx eyedrops. The treatment provided moderate relief.       Constitution: Negative for chills and fever.   HENT: Negative for congestion and sinus pain.    Eyes: Positive for eye discharge, eye pain and eye redness. Negative for eye trauma, foreign body in eye, eye itching, photophobia, vision loss, double vision, blurred vision and eyelid swelling.   Gastrointestinal: Negative for nausea and vomiting.   Skin: Negative for rash.   Allergic/Immunologic: Negative for seasonal allergies and itching.   Neurological: Negative for headaches.       Objective:      Physical Exam   Constitutional: He is oriented to person, place, and time. He appears well-developed and well-nourished. He is cooperative.  Non-toxic appearance. He does not appear ill. No distress.   HENT:   Head: Normocephalic and atraumatic.   Right Ear: " [facility] for [facility]-up/post-op of left shoulder arthroscopy.  Patient still in physical therapy with one more visit scheduled.  Patient's pain today is 1    Rechecks from Jayna Lawrence DPT on 10/19/2023:  Range of Motion (ROM)   (degrees unless noted; active unless noted; norms in ( ); negative=lacking to 0, positive=beyond 0)  Shoulder:    - Flexion (180):        • Left:165     - Abduction (180):        • Left: 175    - Internal Rotation:        - at 90° (70-90):            • Left: 70        - Behind Back:           • Left: T12    - External Rotation:       - at 90° (90):            • Left: 60  Passive: 80     Strength  (out of 5 unless noted, standard test position unless noted)   Shoulder:     - Flexion:         • Left: 4         • Right: 4+     - Abduction:        • Left: 4        • Right: 4+    - Internal Rotation:          • Left (at 0°): 4+        • Right (at 0°): 4+    - External Rotation:         • Left (at 0°): 4        • Right (at 0°) :4+         Patient seen less than 8 minutes; no charge     POST-OP VISIT     SUBJECTIVE:  Five months status post left shoulder arthroscopy and rotator cuff repair.  The patient has noted continued improvements in his doing quite well.  He only notices some mild weakness.    OT Measurement /lnfo:      - Flexion (180):        • Left:165     - Abduction (180):        • Left: 175    - Internal Rotation:        - at 90° (70-90):            • Left: 70        - Behind Back:           • Left: T12    - External Rotation:       - at 90° (90):            • Left: 60  Passive: 80     Strength  (out of 5 unless noted, standard test position unless noted)   Shoulder:     - Flexion:         • Left: 4         • Right: 4+     - Abduction:        • Left: 4        • Right: 4+    - Internal Rotation:          • Left (at 0°): 4+        • Right (at 0°): 4+    - External Rotation:         • Left (at 0°): 4        • Right (at 0°) :4+        OBJECTIVE  NAD, Alert and oriented x 3  No labored breathing  Eyes track equally to motion    Left shoulder:   No parascapular atrophy   Full active range of motion as compared to contralateral   The only mild weakness with external rotation    ASSESSMENT:  Status post left rotator cuff repair    PLAN:  Patient has done very well.  He will continue with the strengthening exercises which I advised that he due for another few months.  He is done very well and I do believe some of the mild weakness he has with external rotation will continue to improve.  Follow up as needed   Hearing, tympanic membrane, external ear and ear canal normal.   Left Ear: Hearing, tympanic membrane, external ear and ear canal normal.   Nose: Nose normal. No mucosal edema, rhinorrhea or nasal deformity. No epistaxis. Right sinus exhibits no maxillary sinus tenderness and no frontal sinus tenderness. Left sinus exhibits no maxillary sinus tenderness and no frontal sinus tenderness.   Mouth/Throat: Uvula is midline, oropharynx is clear and moist and mucous membranes are normal. No trismus in the jaw. Normal dentition. No uvula swelling. No posterior oropharyngeal erythema.   Eyes: Conjunctivae and lids are normal. Right eye exhibits no discharge. Left eye exhibits no discharge. No scleral icterus.   Right conjunctiva with severe erythema mild swelling of the upper eyelid left conjunctiva no erythema   Neck: Trachea normal, normal range of motion, full passive range of motion without pain and phonation normal. Neck supple.   Cardiovascular: Normal rate, regular rhythm, normal heart sounds, intact distal pulses and normal pulses.   Pulmonary/Chest: Effort normal and breath sounds normal. No respiratory distress.   Abdominal: Soft. Normal appearance and bowel sounds are normal. He exhibits no distension, no pulsatile midline mass and no mass. There is no tenderness.   Musculoskeletal: Normal range of motion. He exhibits no edema or deformity.   Neurological: He is alert and oriented to person, place, and time. He exhibits normal muscle tone. Coordination normal.   Skin: Skin is warm, dry, intact, not diaphoretic and not pale.   Psychiatric: He has a normal mood and affect. His speech is normal and behavior is normal. Judgment and thought content normal. Cognition and memory are normal.   Nursing note and vitals reviewed.        Assessment:       1. Acute bacterial conjunctivitis of right eye        Plan:         Acute bacterial conjunctivitis of right eye    Other orders  -     tobramycin sulfate 0.3% (TOBREX) 0.3 %  HTN (hypertension) ophthalmic solution; Place 1 drop into the right eye every 4 (four) hours. for 10 days  Dispense: 5 mL; Refill: 0        patient advised to rinse I prior to eye drop if symptoms persist to follow up with the ophthalmologist

## 2024-02-17 ENCOUNTER — HOSPITAL ENCOUNTER (EMERGENCY)
Facility: HOSPITAL | Age: 64
Discharge: HOME | End: 2024-02-17
Attending: EMERGENCY MEDICINE
Payer: MEDICAID

## 2024-02-17 VITALS
HEIGHT: 70 IN | SYSTOLIC BLOOD PRESSURE: 121 MMHG | RESPIRATION RATE: 18 BRPM | DIASTOLIC BLOOD PRESSURE: 62 MMHG | WEIGHT: 170 LBS | BODY MASS INDEX: 24.34 KG/M2 | OXYGEN SATURATION: 98 % | TEMPERATURE: 98 F | HEART RATE: 90 BPM

## 2024-02-17 DIAGNOSIS — T78.40XA ALLERGIC REACTION, INITIAL ENCOUNTER: Primary | ICD-10-CM

## 2024-02-17 DIAGNOSIS — R50.9 FEVER, UNSPECIFIED FEVER CAUSE: ICD-10-CM

## 2024-02-17 DIAGNOSIS — T78.3XXA ANGIOEDEMA, INITIAL ENCOUNTER: ICD-10-CM

## 2024-02-17 DIAGNOSIS — R50.9 FEVER: ICD-10-CM

## 2024-02-17 LAB
ALBUMIN SERPL BCP-MCNC: 3.9 G/DL (ref 3.5–5.2)
ALP SERPL-CCNC: 96 U/L (ref 55–135)
ALT SERPL W/O P-5'-P-CCNC: 51 U/L (ref 10–44)
ANION GAP SERPL CALC-SCNC: 16 MMOL/L (ref 8–16)
AST SERPL-CCNC: 53 U/L (ref 10–40)
BACTERIA #/AREA URNS HPF: ABNORMAL /HPF
BASOPHILS # BLD AUTO: 0.02 K/UL (ref 0–0.2)
BASOPHILS NFR BLD: 0.2 % (ref 0–1.9)
BILIRUB SERPL-MCNC: 3.8 MG/DL (ref 0.1–1)
BILIRUB UR QL STRIP: NEGATIVE
BUN SERPL-MCNC: 19 MG/DL (ref 8–23)
CALCIUM SERPL-MCNC: 9.5 MG/DL (ref 8.7–10.5)
CHLORIDE SERPL-SCNC: 100 MMOL/L (ref 95–110)
CLARITY UR: CLEAR
CO2 SERPL-SCNC: 18 MMOL/L (ref 23–29)
COLOR UR: YELLOW
CREAT SERPL-MCNC: 1.2 MG/DL (ref 0.5–1.4)
CRP SERPL-MCNC: 148.4 MG/L (ref 0–8.2)
CTP QC/QA: YES
CTP QC/QA: YES
DIFFERENTIAL METHOD BLD: ABNORMAL
EOSINOPHIL # BLD AUTO: 0 K/UL (ref 0–0.5)
EOSINOPHIL NFR BLD: 0 % (ref 0–8)
ERYTHROCYTE [DISTWIDTH] IN BLOOD BY AUTOMATED COUNT: 16.3 % (ref 11.5–14.5)
EST. GFR  (NO RACE VARIABLE): >60 ML/MIN/1.73 M^2
GLUCOSE SERPL-MCNC: 133 MG/DL (ref 70–110)
GLUCOSE UR QL STRIP: NEGATIVE
HCT VFR BLD AUTO: 39.4 % (ref 40–54)
HGB BLD-MCNC: 14.5 G/DL (ref 14–18)
HGB UR QL STRIP: ABNORMAL
HYALINE CASTS #/AREA URNS LPF: 0 /LPF
IMM GRANULOCYTES # BLD AUTO: 0.07 K/UL (ref 0–0.04)
IMM GRANULOCYTES NFR BLD AUTO: 0.6 % (ref 0–0.5)
KETONES UR QL STRIP: NEGATIVE
LACTATE SERPL-SCNC: 2.5 MMOL/L (ref 0.5–2.2)
LEUKOCYTE ESTERASE UR QL STRIP: NEGATIVE
LYMPHOCYTES # BLD AUTO: 0.8 K/UL (ref 1–4.8)
LYMPHOCYTES NFR BLD: 6.5 % (ref 18–48)
MCH RBC QN AUTO: 32.1 PG (ref 27–31)
MCHC RBC AUTO-ENTMCNC: 36.8 G/DL (ref 32–36)
MCV RBC AUTO: 87 FL (ref 82–98)
MICROSCOPIC COMMENT: ABNORMAL
MONOCYTES # BLD AUTO: 0.8 K/UL (ref 0.3–1)
MONOCYTES NFR BLD: 6.4 % (ref 4–15)
NEUTROPHILS # BLD AUTO: 10.6 K/UL (ref 1.8–7.7)
NEUTROPHILS NFR BLD: 86.3 % (ref 38–73)
NITRITE UR QL STRIP: NEGATIVE
NRBC BLD-RTO: 0 /100 WBC
PH UR STRIP: 6 [PH] (ref 5–8)
PLATELET # BLD AUTO: 101 K/UL (ref 150–450)
PMV BLD AUTO: 9.7 FL (ref 9.2–12.9)
POC MOLECULAR INFLUENZA A AGN: NEGATIVE
POC MOLECULAR INFLUENZA B AGN: NEGATIVE
POTASSIUM SERPL-SCNC: 4.1 MMOL/L (ref 3.5–5.1)
PROCALCITONIN SERPL IA-MCNC: 0.32 NG/ML
PROT SERPL-MCNC: 8.3 G/DL (ref 6–8.4)
PROT UR QL STRIP: ABNORMAL
RBC # BLD AUTO: 4.52 M/UL (ref 4.6–6.2)
RBC #/AREA URNS HPF: 11 /HPF (ref 0–4)
SARS-COV-2 RDRP RESP QL NAA+PROBE: NEGATIVE
SODIUM SERPL-SCNC: 134 MMOL/L (ref 136–145)
SP GR UR STRIP: 1.02 (ref 1–1.03)
URN SPEC COLLECT METH UR: ABNORMAL
UROBILINOGEN UR STRIP-ACNC: ABNORMAL EU/DL
WBC # BLD AUTO: 12.27 K/UL (ref 3.9–12.7)
WBC #/AREA URNS HPF: 2 /HPF (ref 0–5)

## 2024-02-17 PROCEDURE — 96374 THER/PROPH/DIAG INJ IV PUSH: CPT

## 2024-02-17 PROCEDURE — 96372 THER/PROPH/DIAG INJ SC/IM: CPT | Mod: 59 | Performed by: EMERGENCY MEDICINE

## 2024-02-17 PROCEDURE — 99285 EMERGENCY DEPT VISIT HI MDM: CPT | Mod: 25

## 2024-02-17 PROCEDURE — 83605 ASSAY OF LACTIC ACID: CPT | Performed by: EMERGENCY MEDICINE

## 2024-02-17 PROCEDURE — 96375 TX/PRO/DX INJ NEW DRUG ADDON: CPT

## 2024-02-17 PROCEDURE — 63600175 PHARM REV CODE 636 W HCPCS: Performed by: EMERGENCY MEDICINE

## 2024-02-17 PROCEDURE — 81000 URINALYSIS NONAUTO W/SCOPE: CPT | Performed by: EMERGENCY MEDICINE

## 2024-02-17 PROCEDURE — 80053 COMPREHEN METABOLIC PANEL: CPT | Performed by: EMERGENCY MEDICINE

## 2024-02-17 PROCEDURE — 86140 C-REACTIVE PROTEIN: CPT | Performed by: EMERGENCY MEDICINE

## 2024-02-17 PROCEDURE — 25000003 PHARM REV CODE 250: Performed by: EMERGENCY MEDICINE

## 2024-02-17 PROCEDURE — 87502 INFLUENZA DNA AMP PROBE: CPT

## 2024-02-17 PROCEDURE — 87040 BLOOD CULTURE FOR BACTERIA: CPT | Mod: 59 | Performed by: EMERGENCY MEDICINE

## 2024-02-17 PROCEDURE — 85025 COMPLETE CBC W/AUTO DIFF WBC: CPT | Performed by: EMERGENCY MEDICINE

## 2024-02-17 PROCEDURE — 84145 PROCALCITONIN (PCT): CPT | Performed by: EMERGENCY MEDICINE

## 2024-02-17 PROCEDURE — 87635 SARS-COV-2 COVID-19 AMP PRB: CPT | Performed by: EMERGENCY MEDICINE

## 2024-02-17 PROCEDURE — 96361 HYDRATE IV INFUSION ADD-ON: CPT

## 2024-02-17 RX ORDER — EPINEPHRINE 0.3 MG/.3ML
1 INJECTION SUBCUTANEOUS
Qty: 2 ML | Refills: 0 | Status: SHIPPED | OUTPATIENT
Start: 2024-02-17 | End: 2025-02-16

## 2024-02-17 RX ORDER — DIPHENHYDRAMINE HYDROCHLORIDE 50 MG/ML
50 INJECTION INTRAMUSCULAR; INTRAVENOUS
Status: COMPLETED | OUTPATIENT
Start: 2024-02-17 | End: 2024-02-17

## 2024-02-17 RX ORDER — DEXAMETHASONE SODIUM PHOSPHATE 4 MG/ML
16 INJECTION, SOLUTION INTRA-ARTICULAR; INTRALESIONAL; INTRAMUSCULAR; INTRAVENOUS; SOFT TISSUE
Status: COMPLETED | OUTPATIENT
Start: 2024-02-17 | End: 2024-02-17

## 2024-02-17 RX ORDER — EPINEPHRINE 0.3 MG/.3ML
0.3 INJECTION SUBCUTANEOUS ONCE
Status: COMPLETED | OUTPATIENT
Start: 2024-02-17 | End: 2024-02-17

## 2024-02-17 RX ORDER — DOXYCYCLINE HYCLATE 100 MG
100 TABLET ORAL ONCE
Status: DISCONTINUED | OUTPATIENT
Start: 2024-02-17 | End: 2024-02-17 | Stop reason: HOSPADM

## 2024-02-17 RX ORDER — PREDNISONE 50 MG/1
50 TABLET ORAL DAILY
Qty: 5 TABLET | Refills: 0 | Status: SHIPPED | OUTPATIENT
Start: 2024-02-17 | End: 2024-02-22

## 2024-02-17 RX ORDER — DOXYCYCLINE 100 MG/1
100 CAPSULE ORAL 2 TIMES DAILY
Qty: 14 CAPSULE | Refills: 0 | Status: SHIPPED | OUTPATIENT
Start: 2024-02-17 | End: 2024-02-24

## 2024-02-17 RX ORDER — ACETAMINOPHEN 500 MG
1000 TABLET ORAL
Status: COMPLETED | OUTPATIENT
Start: 2024-02-17 | End: 2024-02-17

## 2024-02-17 RX ADMIN — SODIUM CHLORIDE 1000 ML: 9 INJECTION, SOLUTION INTRAVENOUS at 03:02

## 2024-02-17 RX ADMIN — DIPHENHYDRAMINE HYDROCHLORIDE 50 MG: 50 INJECTION, SOLUTION INTRAMUSCULAR; INTRAVENOUS at 02:02

## 2024-02-17 RX ADMIN — ACETAMINOPHEN 1000 MG: 500 TABLET ORAL at 02:02

## 2024-02-17 RX ADMIN — EPINEPHRINE 0.3 MG: 0.3 INJECTION SUBCUTANEOUS at 02:02

## 2024-02-17 RX ADMIN — DEXAMETHASONE SODIUM PHOSPHATE 16 MG: 4 INJECTION, SOLUTION INTRA-ARTICULAR; INTRALESIONAL; INTRAMUSCULAR; INTRAVENOUS; SOFT TISSUE at 02:02

## 2024-02-17 NOTE — ED NOTES
Pt aao x4 drove self here today for facial swelling since Tues when he accidentally took an aspirin for a headache.  Took benadryl for swelling.  Denies resp complaint.  Lungs are CTA

## 2024-02-17 NOTE — ED NOTES
Pt aao x4, amb to bathroom and back in NAD.  Face is significantly improved- swelling minimal- able to open both eyes fully and redness has decreased.   at bedside to reassess

## 2024-02-17 NOTE — DISCHARGE INSTRUCTIONS
Thank you for coming in to see us at [facility] It was nice to meet you, and I hope you feel better soon. Please feel free to return to the ER at any time should your symptoms get worse, or if you have different emergent concerns.    Our goal in the emergency department is to always give you outstanding care and exceptional service. You may receive a survey by mail or e-mail in the next week regarding your experience in our ED. We would greatly appreciate your completing and returning the survey. Your feedback provides us with a way to recognize our staff who give very good care and it helps us learn how to improve when your experience was below our aspiration of excellence.      It is important to remember that some problems or medical conditions are difficult to diagnose and may not be found or addressed during your Emergency Department visit.  These conditions often start with non-specific symptoms and can only be diagnosed on follow up visits with your primary care physician or specialist when the symptoms continue or change. Please remember that all medical conditions can change, and we cannot predict how you will be feeling tomorrow or the next day.    Return to the ER with any questions/concerns, new/concerning symptoms, worsening, failure to improve or inability to obtain follow-up.       Be sure to follow up with your primary care doctor and review all labs/imaging/tests that were performed during your ER visit with them. It is very common for us to identify non-emergent incidental findings which must be followed up with your primary care physician.  Some labs/imaging/tests may be outside of the normal range, and require non-emergent follow-up and/or further investigation/treatment/procedures/testing to help diagnose/exclude/prevent complications or other potentially serious medical conditions. Some abnormalities may not have been discussed or addressed during your ER visit. Some lab  results may not return during your ER visit but can be accessible by downloading the free [facility] [email or web address] or by visiting [email or web address] . It is important for you to review all labs/imaging/tests which are outside of the normal range with your physician.    An ER visit does not replace a primary care visit, and many screening tests or follow-up tests cannot be ordered by an ER doctor or performed by the ER. Some tests may even require pre-approval.    If you do not have a primary care doctor, you may contact the one listed on your discharge paperwork or you may also call the [facility] Appointment Desk at [phone] , or [facility] at  [phone] to schedule an appointment, or establish care with a primary care doctor or even a specialist and to obtain information about local resources. It is important to your health that you have a primary care doctor.    Please take all medications as directed. We have done our best to select a medication for you that will treat your condition however, all medications may potentially have side-effects and it is impossible to predict which medications may give you side-effects or what those side-effects (if any) those medications may give you.  If you feel that you are having a negative effect or side-effect of any medication you should stop taking those medications immediately and seek medical attention. If you feel that you are having a life-threatening reaction call 911.        Do not drive, swim, climb to height, take a bath, operate heavy machinery, drink alcohol or take potentially sedating medications, sign any legal documents or make any important decisions for 24 hours if you have received any pain medications, sedatives or mood altering drugs during your ER visit or within 24 hours of taking them if they have been prescribed to you.     You can find additional resources for Dentists, hearing aids, durable medical equipment, low cost pharmacies and  other resources at [email or web address]
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
## 2024-02-22 LAB
BACTERIA BLD CULT: NORMAL
BACTERIA BLD CULT: NORMAL

## 2024-05-23 ENCOUNTER — HOSPITAL ENCOUNTER (EMERGENCY)
Facility: HOSPITAL | Age: 64
Discharge: HOME | End: 2024-05-23
Attending: EMERGENCY MEDICINE
Payer: MEDICAID

## 2024-05-23 VITALS
RESPIRATION RATE: 16 BRPM | OXYGEN SATURATION: 98 % | HEIGHT: 70 IN | DIASTOLIC BLOOD PRESSURE: 93 MMHG | SYSTOLIC BLOOD PRESSURE: 145 MMHG | HEART RATE: 102 BPM | TEMPERATURE: 98 F | WEIGHT: 173 LBS | BODY MASS INDEX: 24.77 KG/M2

## 2024-05-23 DIAGNOSIS — L03.113 CELLULITIS OF RIGHT UPPER EXTREMITY: Primary | ICD-10-CM

## 2024-05-23 PROCEDURE — 99283 EMERGENCY DEPT VISIT LOW MDM: CPT | Mod: 25

## 2024-05-23 PROCEDURE — 25000003 PHARM REV CODE 250

## 2024-05-23 RX ORDER — ACETAMINOPHEN 500 MG
1000 TABLET ORAL
Status: COMPLETED | OUTPATIENT
Start: 2024-05-23 | End: 2024-05-23

## 2024-05-23 RX ORDER — CLINDAMYCIN HYDROCHLORIDE 150 MG/1
450 CAPSULE ORAL 3 TIMES DAILY
Qty: 63 CAPSULE | Refills: 0 | Status: SHIPPED | OUTPATIENT
Start: 2024-05-23 | End: 2024-05-30

## 2024-05-23 RX ADMIN — ACETAMINOPHEN 1000 MG: 500 TABLET ORAL at 10:05

## 2024-05-23 NOTE — DISCHARGE INSTRUCTIONS
Please keep your wound clean and dry. Wash gently with soap and water and apply antibiotic ointment (bacitracin, neosporin, etc.) over the wound after washing. Watch for signs of infection including: increased\spreading redness, swelling, pus-like discharge, or a fever greater than 100.4F. If you experience any of these, please contact your primary care doctor or return to the emergency room for a wound check. You may take over the counter Ibuprofen or Tylenol as needed for pain.     Please follow up with your primary care doctor in 2-3 days for wound recheck.    Thank you for allowing me and my emergency team to take care of you here today! I hope you feel better soon. Please do not hesitate to return with any additional concerns that may arise from this or any new problem you encounter.    Our goal in the emergency department is to always give you outstanding care and exceptional service. If you receive a survey by mail or e-mail in the next week regarding your experience in our ED, we would greatly appreciate you completing it. Your feedback provides us with a way to recognize our staff who give very good care and it helps us learn how to improve when your experience was below the excellence we aspire to be!    Brook Juneau, PA-C [facility]   Emergency Room Physician Assistant
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
## 2024-05-23 NOTE — Clinical Note
"Daniel Gil" Florentino was seen and treated in our emergency department on 5/23/2024.  He may return to work on 05/24/2024.       If you have any questions or concerns, please don't hesitate to call.      Kari Florentino PA-C"

## 2024-10-24 ENCOUNTER — PATIENT MESSAGE (OUTPATIENT)
Dept: RESEARCH | Facility: HOSPITAL | Age: 64
End: 2024-10-24
Payer: MEDICAID
